# Patient Record
Sex: MALE | Race: BLACK OR AFRICAN AMERICAN | NOT HISPANIC OR LATINO | URBAN - METROPOLITAN AREA
[De-identification: names, ages, dates, MRNs, and addresses within clinical notes are randomized per-mention and may not be internally consistent; named-entity substitution may affect disease eponyms.]

---

## 2017-11-30 ENCOUNTER — INPATIENT (INPATIENT)
Facility: HOSPITAL | Age: 52
LOS: 2 days | Discharge: HOME | End: 2017-12-03
Attending: SURGERY | Admitting: SURGERY
Payer: COMMERCIAL

## 2017-11-30 VITALS
HEART RATE: 80 BPM | RESPIRATION RATE: 21 BRPM | SYSTOLIC BLOOD PRESSURE: 136 MMHG | DIASTOLIC BLOOD PRESSURE: 70 MMHG | OXYGEN SATURATION: 100 %

## 2017-11-30 DIAGNOSIS — K56.1 INTUSSUSCEPTION: ICD-10-CM

## 2017-11-30 DIAGNOSIS — Z98.0 INTESTINAL BYPASS AND ANASTOMOSIS STATUS: Chronic | ICD-10-CM

## 2017-11-30 LAB
ALBUMIN SERPL ELPH-MCNC: 4 G/DL — SIGNIFICANT CHANGE UP (ref 3.3–5)
ALP SERPL-CCNC: 63 U/L — SIGNIFICANT CHANGE UP (ref 40–120)
ALT FLD-CCNC: 12 U/L — SIGNIFICANT CHANGE UP (ref 4–41)
APAP SERPL-MCNC: < 15 UG/ML — LOW (ref 15–25)
APTT BLD: 27.9 SEC — SIGNIFICANT CHANGE UP (ref 27.5–37.4)
AST SERPL-CCNC: 24 U/L — SIGNIFICANT CHANGE UP (ref 4–40)
BARBITURATES MEASUREMENT: NEGATIVE — SIGNIFICANT CHANGE UP
BASE EXCESS BLDV CALC-SCNC: 3.4 MMOL/L — SIGNIFICANT CHANGE UP
BASE EXCESS BLDV CALC-SCNC: 5.6 MMOL/L — SIGNIFICANT CHANGE UP
BASOPHILS # BLD AUTO: 0.13 K/UL — SIGNIFICANT CHANGE UP (ref 0–0.2)
BASOPHILS NFR BLD AUTO: 1.9 % — SIGNIFICANT CHANGE UP (ref 0–2)
BENZODIAZ SERPL-MCNC: NEGATIVE — SIGNIFICANT CHANGE UP
BILIRUB SERPL-MCNC: 0.3 MG/DL — SIGNIFICANT CHANGE UP (ref 0.2–1.2)
BLOOD GAS VENOUS - CREATININE: 0.61 MG/DL — SIGNIFICANT CHANGE UP (ref 0.5–1.3)
BLOOD GAS VENOUS - CREATININE: 0.67 MG/DL — SIGNIFICANT CHANGE UP (ref 0.5–1.3)
BUN SERPL-MCNC: 5 MG/DL — LOW (ref 7–23)
CALCIUM SERPL-MCNC: 8.4 MG/DL — SIGNIFICANT CHANGE UP (ref 8.4–10.5)
CHLORIDE BLDV-SCNC: 104 MMOL/L — SIGNIFICANT CHANGE UP (ref 96–108)
CHLORIDE BLDV-SCNC: 106 MMOL/L — SIGNIFICANT CHANGE UP (ref 96–108)
CHLORIDE SERPL-SCNC: 103 MMOL/L — SIGNIFICANT CHANGE UP (ref 98–107)
CO2 SERPL-SCNC: 24 MMOL/L — SIGNIFICANT CHANGE UP (ref 22–31)
CREAT SERPL-MCNC: 0.69 MG/DL — SIGNIFICANT CHANGE UP (ref 0.5–1.3)
EOSINOPHIL # BLD AUTO: 0.75 K/UL — HIGH (ref 0–0.5)
EOSINOPHIL NFR BLD AUTO: 10.9 % — HIGH (ref 0–6)
ETHANOL BLD-MCNC: < 10 MG/DL — SIGNIFICANT CHANGE UP
GAS PNL BLDV: 139 MMOL/L — SIGNIFICANT CHANGE UP (ref 136–146)
GAS PNL BLDV: 140 MMOL/L — SIGNIFICANT CHANGE UP (ref 136–146)
GLUCOSE BLDV-MCNC: 124 — HIGH (ref 70–99)
GLUCOSE BLDV-MCNC: 93 — SIGNIFICANT CHANGE UP (ref 70–99)
GLUCOSE SERPL-MCNC: 116 MG/DL — HIGH (ref 70–99)
HCO3 BLDV-SCNC: 27 MMOL/L — SIGNIFICANT CHANGE UP (ref 20–27)
HCO3 BLDV-SCNC: 29 MMOL/L — HIGH (ref 20–27)
HCT VFR BLD CALC: 27 % — LOW (ref 39–50)
HCT VFR BLD CALC: 27.4 % — LOW (ref 39–50)
HCT VFR BLD CALC: 28.4 % — LOW (ref 39–50)
HCT VFR BLDV CALC: 27.5 % — LOW (ref 39–51)
HCT VFR BLDV CALC: 27.8 % — LOW (ref 39–51)
HGB BLD-MCNC: 8.3 G/DL — LOW (ref 13–17)
HGB BLD-MCNC: 8.5 G/DL — LOW (ref 13–17)
HGB BLD-MCNC: 8.7 G/DL — LOW (ref 13–17)
HGB BLDV-MCNC: 8.9 G/DL — LOW (ref 13–17)
HGB BLDV-MCNC: 9 G/DL — LOW (ref 13–17)
IMM GRANULOCYTES # BLD AUTO: 0.01 # — SIGNIFICANT CHANGE UP
IMM GRANULOCYTES NFR BLD AUTO: 0.1 % — SIGNIFICANT CHANGE UP (ref 0–1.5)
INR BLD: 1.1 — SIGNIFICANT CHANGE UP (ref 0.88–1.17)
LACTATE BLDV-MCNC: 1.1 MMOL/L — SIGNIFICANT CHANGE UP (ref 0.5–2)
LACTATE BLDV-MCNC: 3.8 MMOL/L — HIGH (ref 0.5–2)
LYMPHOCYTES # BLD AUTO: 2.29 K/UL — SIGNIFICANT CHANGE UP (ref 1–3.3)
LYMPHOCYTES # BLD AUTO: 33.4 % — SIGNIFICANT CHANGE UP (ref 13–44)
MCHC RBC-ENTMCNC: 21.3 PG — LOW (ref 27–34)
MCHC RBC-ENTMCNC: 21.6 PG — LOW (ref 27–34)
MCHC RBC-ENTMCNC: 21.6 PG — LOW (ref 27–34)
MCHC RBC-ENTMCNC: 30.6 % — LOW (ref 32–36)
MCHC RBC-ENTMCNC: 30.7 % — LOW (ref 32–36)
MCHC RBC-ENTMCNC: 31 % — LOW (ref 32–36)
MCV RBC AUTO: 69.4 FL — LOW (ref 80–100)
MCV RBC AUTO: 69.5 FL — LOW (ref 80–100)
MCV RBC AUTO: 70.1 FL — LOW (ref 80–100)
MONOCYTES # BLD AUTO: 0.65 K/UL — SIGNIFICANT CHANGE UP (ref 0–0.9)
MONOCYTES NFR BLD AUTO: 9.5 % — SIGNIFICANT CHANGE UP (ref 2–14)
NEUTROPHILS # BLD AUTO: 3.02 K/UL — SIGNIFICANT CHANGE UP (ref 1.8–7.4)
NEUTROPHILS NFR BLD AUTO: 44.2 % — SIGNIFICANT CHANGE UP (ref 43–77)
NRBC # FLD: 0 — SIGNIFICANT CHANGE UP
PCO2 BLDV: 43 MMHG — SIGNIFICANT CHANGE UP (ref 41–51)
PCO2 BLDV: 54 MMHG — HIGH (ref 41–51)
PH BLDV: 7.37 PH — SIGNIFICANT CHANGE UP (ref 7.32–7.43)
PH BLDV: 7.42 PH — SIGNIFICANT CHANGE UP (ref 7.32–7.43)
PLATELET # BLD AUTO: 102 K/UL — LOW (ref 150–400)
PLATELET # BLD AUTO: 80 K/UL — LOW (ref 150–400)
PLATELET # BLD AUTO: 91 K/UL — LOW (ref 150–400)
PMV BLD: SIGNIFICANT CHANGE UP FL (ref 7–13)
PO2 BLDV: 29 MMHG — LOW (ref 35–40)
PO2 BLDV: 52 MMHG — HIGH (ref 35–40)
POTASSIUM BLDV-SCNC: 3.6 MMOL/L — SIGNIFICANT CHANGE UP (ref 3.4–4.5)
POTASSIUM BLDV-SCNC: 3.7 MMOL/L — SIGNIFICANT CHANGE UP (ref 3.4–4.5)
POTASSIUM SERPL-MCNC: 3.9 MMOL/L — SIGNIFICANT CHANGE UP (ref 3.5–5.3)
POTASSIUM SERPL-SCNC: 3.9 MMOL/L — SIGNIFICANT CHANGE UP (ref 3.5–5.3)
PROT SERPL-MCNC: 7.2 G/DL — SIGNIFICANT CHANGE UP (ref 6–8.3)
PROTHROM AB SERPL-ACNC: 12.4 SEC — SIGNIFICANT CHANGE UP (ref 9.8–13.1)
RBC # BLD: 3.85 M/UL — LOW (ref 4.2–5.8)
RBC # BLD: 3.94 M/UL — LOW (ref 4.2–5.8)
RBC # BLD: 4.09 M/UL — LOW (ref 4.2–5.8)
RBC # FLD: 25.4 % — HIGH (ref 10.3–14.5)
RBC # FLD: 25.6 % — HIGH (ref 10.3–14.5)
RBC # FLD: 26.1 % — HIGH (ref 10.3–14.5)
RH IG SCN BLD-IMP: POSITIVE — SIGNIFICANT CHANGE UP
SALICYLATES SERPL-MCNC: < 5 MG/DL — LOW (ref 15–30)
SAO2 % BLDV: 46.9 % — LOW (ref 60–85)
SAO2 % BLDV: 81.9 % — SIGNIFICANT CHANGE UP (ref 60–85)
SODIUM SERPL-SCNC: 141 MMOL/L — SIGNIFICANT CHANGE UP (ref 135–145)
WBC # BLD: 4.81 K/UL — SIGNIFICANT CHANGE UP (ref 3.8–10.5)
WBC # BLD: 6.85 K/UL — SIGNIFICANT CHANGE UP (ref 3.8–10.5)
WBC # BLD: 7.33 K/UL — SIGNIFICANT CHANGE UP (ref 3.8–10.5)
WBC # FLD AUTO: 4.81 K/UL — SIGNIFICANT CHANGE UP (ref 3.8–10.5)
WBC # FLD AUTO: 6.85 K/UL — SIGNIFICANT CHANGE UP (ref 3.8–10.5)
WBC # FLD AUTO: 7.33 K/UL — SIGNIFICANT CHANGE UP (ref 3.8–10.5)

## 2017-11-30 PROCEDURE — 74176 CT ABD & PELVIS W/O CONTRAST: CPT | Mod: 26,59

## 2017-11-30 PROCEDURE — 99253 IP/OBS CNSLTJ NEW/EST LOW 45: CPT | Mod: GC

## 2017-11-30 PROCEDURE — 71010: CPT | Mod: 26

## 2017-11-30 PROCEDURE — 71260 CT THORAX DX C+: CPT | Mod: 26

## 2017-11-30 PROCEDURE — 99221 1ST HOSP IP/OBS SF/LOW 40: CPT | Mod: GC

## 2017-11-30 PROCEDURE — 74177 CT ABD & PELVIS W/CONTRAST: CPT | Mod: 26

## 2017-11-30 RX ORDER — FENTANYL CITRATE 50 UG/ML
100 INJECTION INTRAVENOUS ONCE
Qty: 0 | Refills: 0 | Status: DISCONTINUED | OUTPATIENT
Start: 2017-11-30 | End: 2017-11-30

## 2017-11-30 RX ORDER — ONDANSETRON 8 MG/1
4 TABLET, FILM COATED ORAL EVERY 6 HOURS
Qty: 0 | Refills: 0 | Status: COMPLETED | OUTPATIENT
Start: 2017-11-30 | End: 2017-11-30

## 2017-11-30 RX ORDER — FENTANYL CITRATE 50 UG/ML
50 INJECTION INTRAVENOUS ONCE
Qty: 0 | Refills: 0 | Status: DISCONTINUED | OUTPATIENT
Start: 2017-11-30 | End: 2017-11-30

## 2017-11-30 RX ORDER — PANTOPRAZOLE SODIUM 20 MG/1
80 TABLET, DELAYED RELEASE ORAL ONCE
Qty: 0 | Refills: 0 | Status: COMPLETED | OUTPATIENT
Start: 2017-11-30 | End: 2017-11-30

## 2017-11-30 RX ORDER — PANTOPRAZOLE SODIUM 20 MG/1
40 TABLET, DELAYED RELEASE ORAL EVERY 12 HOURS
Qty: 0 | Refills: 0 | Status: DISCONTINUED | OUTPATIENT
Start: 2017-11-30 | End: 2017-12-03

## 2017-11-30 RX ORDER — SODIUM CHLORIDE 9 MG/ML
1000 INJECTION INTRAMUSCULAR; INTRAVENOUS; SUBCUTANEOUS ONCE
Qty: 0 | Refills: 0 | Status: COMPLETED | OUTPATIENT
Start: 2017-11-30 | End: 2017-11-30

## 2017-11-30 RX ORDER — PANTOPRAZOLE SODIUM 20 MG/1
8 TABLET, DELAYED RELEASE ORAL
Qty: 80 | Refills: 0 | Status: DISCONTINUED | OUTPATIENT
Start: 2017-11-30 | End: 2017-11-30

## 2017-11-30 RX ORDER — ONDANSETRON 8 MG/1
8 TABLET, FILM COATED ORAL ONCE
Qty: 0 | Refills: 0 | Status: COMPLETED | OUTPATIENT
Start: 2017-11-30 | End: 2017-11-30

## 2017-11-30 RX ORDER — SODIUM CHLORIDE 9 MG/ML
1000 INJECTION, SOLUTION INTRAVENOUS
Qty: 0 | Refills: 0 | Status: DISCONTINUED | OUTPATIENT
Start: 2017-11-30 | End: 2017-12-01

## 2017-11-30 RX ORDER — MORPHINE SULFATE 50 MG/1
4 CAPSULE, EXTENDED RELEASE ORAL ONCE
Qty: 0 | Refills: 0 | Status: DISCONTINUED | OUTPATIENT
Start: 2017-11-30 | End: 2017-11-30

## 2017-11-30 RX ADMIN — PANTOPRAZOLE SODIUM 80 MILLIGRAM(S): 20 TABLET, DELAYED RELEASE ORAL at 04:49

## 2017-11-30 RX ADMIN — SODIUM CHLORIDE 1000 MILLILITER(S): 9 INJECTION INTRAMUSCULAR; INTRAVENOUS; SUBCUTANEOUS at 04:49

## 2017-11-30 RX ADMIN — FENTANYL CITRATE 100 MICROGRAM(S): 50 INJECTION INTRAVENOUS at 04:51

## 2017-11-30 RX ADMIN — FENTANYL CITRATE 50 MICROGRAM(S): 50 INJECTION INTRAVENOUS at 05:54

## 2017-11-30 RX ADMIN — FENTANYL CITRATE 50 MICROGRAM(S): 50 INJECTION INTRAVENOUS at 05:53

## 2017-11-30 RX ADMIN — PANTOPRAZOLE SODIUM 10 MG/HR: 20 TABLET, DELAYED RELEASE ORAL at 05:37

## 2017-11-30 RX ADMIN — PANTOPRAZOLE SODIUM 40 MILLIGRAM(S): 20 TABLET, DELAYED RELEASE ORAL at 17:55

## 2017-11-30 RX ADMIN — ONDANSETRON 8 MILLIGRAM(S): 8 TABLET, FILM COATED ORAL at 04:50

## 2017-11-30 RX ADMIN — SODIUM CHLORIDE 100 MILLILITER(S): 9 INJECTION, SOLUTION INTRAVENOUS at 09:55

## 2017-11-30 NOTE — ED ADULT TRIAGE NOTE - CHIEF COMPLAINT QUOTE
Pt. with c/o severe abdominal pain started 3pm acc with vomiting up blood.  Pt. with hx. stomach ulcers. Pt. riding in pain; very uncomfortable in triage. Unable to get vitals at this time. Pt. cannot sit still. very uncomfortable.

## 2017-11-30 NOTE — ED PROVIDER NOTE - CRITICAL CARE PROVIDED
interpretation of diagnostic studies/consultation with other physicians/consult w/ pt's family directly relating to pts condition/direct patient care (not related to procedure)/additional history taking/documentation

## 2017-11-30 NOTE — ED PROVIDER NOTE - ATTENDING CONTRIBUTION TO CARE
DR. ELLIOTT, ATTENDING MD-  I performed a face to face bedside interview with patient regarding history of present illness, review of symptoms and past medical history. I completed an independent physical exam.  I have discussed patient's plan of care with the resident.   Documentation as above in the note.  HPI: 52M with PMH PUD, gastritis presenting with abdominal pain 2 hrs prior to presentation with associated hematemesis. Pt reports was vomiting, no blood 2 hours prior to arrival, went to shower, then started having more N/V. called sister who lives upstairs, came down, drove pt to ED, en route, vomiting large amount BRB. Reports epigastric pain, no chest pain. Has had this before.   EXAM: non tender abd, no rebound, no guarding, BRB emesis in basin and active vomiting in ED. Appears severely uncomfortable. Writhing. Limited eval given condition.   MDM: Set up for intubation for airway protection. 2 large bore PIV, ordered uncross matched blood, resp therapy called for possible intubation. Sister and pt made aware. 2L NS started, waiting blood products. labs sent, CT ordered, CXR shows no free air under diaphragm, no PNA, no wide mediastinum.   CT resulted, shows intussception of SB into stomach. GI and Surgery aware. Will need surgical vs endoscopic intervention. Meanwhile will pain control and monitor closely.

## 2017-11-30 NOTE — ED PROVIDER NOTE - PROGRESS NOTE DETAILS
Sarahi: GI fellow paged Sarahi: GI fellow paged. no response Surgery aware and consulted. Sarahi: Patient's airway questionable initially with hematemesis. Emergent intubation considered. Patient's condition improved after medication with patient stable, protecting airway, mentating, stating pain much improved. Decision to observe and not intubate.   GI fellow paged. no response Sarahi: Patient stable. Protecting airway, mentating. No further episodes of hematemesis. Stating pain is mostly gone. Comfortable. Awaiting GI and surgery recs. Sarahi: GI aware and to see patient Pt stable, Surgery recs that they want Pt stable, Surgery recs that they want GI to EGD pt first before considering surgical intervention. Pending GI call back. Paged GI fellow multiple times, they are aware but have not seen pt. GI attending paged at this time. Spoke again to surgery resident, inquired if pt will most likely end of up surgical service even if GI performs EGD and he will speak to his attending regarding this.

## 2017-11-30 NOTE — H&P ADULT - NSHPPHYSICALEXAM_GEN_ALL_CORE
General: NAD, Lying in bed, appears comfortable now  Neuro: A+Ox3, no focal deficits  HEENT: NC/AT, EOMI  Cardio: RRR  Resp: Good effort  GI/Abd: Soft, minimally distended, non-tender, no rebound/guarding, no masses palpated.  Well-healed upper midline incision.    Vascular: All 4 extremities warm  Musculoskeletal: All 4 extremities moving spontaneously, no limitations.

## 2017-11-30 NOTE — ED PROVIDER NOTE - MEDICAL DECISION MAKING DETAILS
52M presenting with severe abdominal pain and nuria hematemesis with hx of PUD requiring biliroth II. Patient in severe distress and may require emergent airway protection. Likely UGI bleed given nuria red hematemesis with DDx includes bleeding PUD vs perforation, sincere jha tear, boerhaave, gastritis. Will order emergent noncrossed O+ blood, cbc, cmp, coags, type/screen, upright cxr, protonix, ct abd/pelvis, ct chest, zofran, morphine, fluids and reassess

## 2017-11-30 NOTE — H&P ADULT - HISTORY OF PRESENT ILLNESS
Patient is a 52y old  Male who presents with a chief complaint of abdominal pain and hematemesis.  His symptoms started early this morning, and his pain awoke him from his sleep.  He began feeling nauseated, and had several episodes of small-volume hematemesis at home.  He was brought to the ED by his sister for further evaluation.  He continued having several episodes of hematemesis after presentation to the ED.  His pain and nausea was medically controlled, and CT scan of the abdomen showed a jejuno-gastric intussusception.  Surgery consulted for possible intervention.      Patient has a history of PUD, and had a Billroth 2 procedure done over 20 years ago at an outside hospital for peptic ulcer disease.  He states that he had initially recovered well and was symptom free for many years, but over the past 3 years, he has presented to the hospital for similar symptoms.  He had workup including upper endoscopy, which showed gastritis with friable distal gastric mucosa, without any masses seen.      Patient denies fevers/chills, denies lightheadedness/dizziness, denies SOB/chest pain, denies constipation/diarrhea.  Last BM was yesterday, was normal, without nuria blood.      -------------------------------------------------------------------------------------------- Patient is a 52y old  Male who presents with a chief complaint of abdominal pain and hematemesis.  His symptoms started early this morning, and his pain awoke him from his sleep.  He began feeling nauseated, and had several episodes of small-volume hematemesis at home.  He was brought to the ED by his sister for further evaluation.  He continued having several episodes of hematemesis after presentation to the ED.  His pain and nausea was medically controlled, and CT scan of the abdomen showed a jejuno-gastric intussusception.  Surgery consulted for possible intervention.      Patient has a history of PUD, and had a Billroth 2 procedure done over 20 years ago at an outside hospital for peptic ulcer disease.  He states that he had initially recovered well and was symptom free for many years, but over the past 3 years, he has presented to the hospital for similar symptoms.  He had workup including upper endoscopy, which showed gastritis with friable distal gastric mucosa, without any masses seen.  Patient was recommended to followup with gastroenterology for repeat interval upper endoscopy as an outpatient, but patient has not followed up.      Patient denies fevers/chills, denies lightheadedness/dizziness, denies SOB/chest pain, denies constipation/diarrhea.  Last BM was yesterday, was normal, without nuria blood.      --------------------------------------------------------------------------------------------

## 2017-11-30 NOTE — H&P ADULT - NSHPLABSRESULTS_GEN_ALL_CORE
CBC (11-30 @ 04:30)                              8.7<L>                         6.85    )----------------(  102<L>     44.2  % Neutrophils, 33.4  % Lymphocytes, ANC: 3.02                                28.4<L>    BMP (11-30 @ 04:30)             141     |  103     |  5<L>  		Ca++ --      Ca 8.4                ---------------------------------( 116<H>		Mg --                 3.9     |  24      |  0.69  			Ph --        LFTs (11-30 @ 04:30)      TPro 7.2 / Alb 4.0 / TBili 0.3 / DBili -- / AST 24 / ALT 12 / AlkPhos 63    Coags (11-30 @ 04:30)  aPTT 27.9 / INR 1.10 / PT 12.4    VBG (11-30 @ 04:30)     7.42 / 43 / 29<L> / 27 / 3.4 / 46.9<L>%     Lactate: 3.8<H>    IMAGING:    < from: CT Chest w/ IV Cont (11.30.17 @ 05:28) >    IMPRESSION:     Intussusception along the gastrojejunostomy site with small bowel,   mesentery, and mesenteric vessels entering the inferior aspect of the   stomach.    < end of copied text >

## 2017-11-30 NOTE — CONSULT NOTE ADULT - ASSESSMENT
Impression: Impression:   - Hematemesis    Plan:  - trend CBC q12h, transfuse as needed to Hb goal>7  - please pantoprazole with bolus and then infusion at 8mg/hr  - please keep patient NPO for EGD  - supportive care as per primary team    GI team will continue to follow.  Thank you for the consult. Impression:   - Hematemesis with history of gastric ulcer and billroth II procedure; this episode could be 2/2 gastric ulcer vs anastomotic ulcer vs MW tear (given multiple episodes of vomiting)  - Abdominal pain 2/2 intussusception seen on CT abdomen    Plan:  - trend CBC q12h, transfuse as needed to Hb goal>7  - please pantoprazole with bolus and then infusion at 8mg/hr  - please keep patient NPO for possible EGD today  - serial abdominal exams to monitor for bowel ischemia  - pending abdominal ultrasound to assess for ongoing intussusception as per surgery  - supportive care as per primary team    GI team will continue to follow.  Thank you for the consult.

## 2017-11-30 NOTE — CONSULT NOTE ADULT - SUBJECTIVE AND OBJECTIVE BOX
Chief Complaint:  Patient is a 52y old  Male who presents with a chief complaint of     HPI:  Mr Ely is a 52 year old male with history of peptic ulcer disease, gastritis presenting with abdominal pain 2 hrs prior to presentation with associated hematemesis. As per family bedside started 2 hours ago with mild abdominal pain and mild hemoptysis which progressively worsened to severe abdominal pain and nuria hematemesis. Patient states he had some tea and a small meal prior to symptom onset and was feeling okay. States he has had problems with PUD and abdominal pain for years. Patient with hx of PUD requiring biliroth II for perforation roughly 20 years ago. Denies chest pain, sob, fever, chills, weaknes    Allergies:  No Known Allergies      Home Medications:    Hospital Medications:  morphine  - Injectable 4 milliGRAM(s) IV Push Once  pantoprazole Infusion 8 mG/Hr IV Continuous <Continuous>      PMHX/PSHX:  Chronic anemia  Gastric ulcer  History of Billroth II operation  S/P partial gastrectomy      Family history:  No pertinent family history in first degree relatives      Social History:     ROS:     General:  No wt loss, fevers, chills, night sweats, fatigue,   Eyes:  Good vision, no reported pain  ENT:  No sore throat, pain, runny nose, dysphagia  CV:  No pain, palpitations, hypo/hypertension  Resp:  No dyspnea, cough, tachypnea, wheezing  GI:  See HPI  :  No pain, bleeding, incontinence, nocturia  Muscle:  No pain, weakness  Neuro:  No weakness, tingling, memory problems  Psych:  No fatigue, insomnia, mood problems, depression  Endocrine:  No polyuria, polydipsia, cold/heat intolerance  Heme:  No petechiae, ecchymosis, easy bruisability  Skin:  No rash, edema      PHYSICAL EXAM:     GENERAL:  Appears stated age, well-groomed, well-nourished, no distress  HEENT:  NC/AT,  conjunctivae clear and pink,  no JVD  CHEST:  Full & symmetric excursion, no increased effort, breath sounds clear  HEART:  Regular rhythm, S1, S2, no murmur/rub/S3/S4, no abdominal bruit, no edema  ABDOMEN:  Soft, non-tender, non-distended, normoactive bowel sounds,  no masses ,  EXTREMITIES:  no cyanosis,clubbing or edema  SKIN:  No rash/erythema/ecchymoses/petechiae/wounds/abscess/warm/dry  NEURO:  Alert, oriented    Vital Signs:  Vital Signs Last 24 Hrs  T(C): 36.6 (30 Nov 2017 06:24), Max: 37.1 (30 Nov 2017 05:25)  T(F): 97.8 (30 Nov 2017 06:24), Max: 98.8 (30 Nov 2017 05:25)  HR: 77 (30 Nov 2017 06:24) (76 - 84)  BP: 140/84 (30 Nov 2017 06:24) (124/61 - 144/81)  BP(mean): --  RR: 22 (30 Nov 2017 06:24) (16 - 22)  SpO2: 99% (30 Nov 2017 06:24) (98% - 100%)  Daily     Daily     LABS:                        8.7    6.85  )-----------( 102      ( 30 Nov 2017 04:30 )             28.4     11-30    141  |  103  |  5<L>  ----------------------------<  116<H>  3.9   |  24  |  0.69    Ca    8.4      30 Nov 2017 04:30    TPro  7.2  /  Alb  4.0  /  TBili  0.3  /  DBili  x   /  AST  24  /  ALT  12  /  AlkPhos  63  11-30    LIVER FUNCTIONS - ( 30 Nov 2017 04:30 )  Alb: 4.0 g/dL / Pro: 7.2 g/dL / ALK PHOS: 63 u/L / ALT: 12 u/L / AST: 24 u/L / GGT: x           PT/INR - ( 30 Nov 2017 04:30 )   PT: 12.4 SEC;   INR: 1.10          PTT - ( 30 Nov 2017 04:30 )  PTT:27.9 SEC        Imaging:    < from: Upper Endoscopy (08.09.16 @ 09:30) >                                            Impression:          - Normal esophagus.                       - Z-line regular, 35 cm from the incisors.                       - Billroth II gastrojejunostomy was found, characterized                        by focal area of friable mucosa at gastric remnant.                       - No specimens collected.  Recommendation:      - Advance diet as tolerated.                       - Use Protonix (pantoprazole) 40 mg PO BID.                       - Plan for repeat endoscopy and possible colonoscoy when                        platelets above 50,000                                                                                     < end of copied text > Chief Complaint:  Patient is a 52y old  Male who presents with a chief complaint of     HPI:  Mr Ely is a 52 year old male with history of peptic ulcer disease (requiring biliroth II for perforation roughly 20 years ago) who presented with chief complain of abdominal pain with associated hematemesis.     As per patient, he was asleep, when he was woken up with mild epigastric abdominal pain and multiple episodes of vomiting, initially with food and then red blood (about a cup).  He denies chest pain, sob, fever, chills, weakness, nausea, lightheadedness, dizziness, constipation, diarrhea. His last BM was yesterday was brown in color without nuria blood.      Of note, he had recent outpatient workup including upper endoscopy, which showed gastritis with friable distal gastric mucosa, without any masses seen.      In the ER: GI was consulted for hematemesis and a CT scan of the abdomen showed a jejuno-gastric intussusception for which surgery was consulted.     Allergies:  No Known Allergies      Home Medications:   Patient Currently Takes Medications as of 10-Aug-2016 14:15 documented in Structured Notes  · 	ferrous sulfate 160 mg (50 mg elemental iron) oral tablet, extended release: 1 tab(s) orally 3 times a day with prune juice  · 	pantoprazole 40 mg oral delayed release tablet: 1 tab(s) orally 2 times a day (before meals)    Hospital Medications:  morphine  - Injectable 4 milliGRAM(s) IV Push Once  pantoprazole Infusion 8 mG/Hr IV Continuous <Continuous>      PMHX/PSHX:  Chronic anemia  Gastric ulcer  History of Billroth II operation  S/P partial gastrectomy      Family history:  Denies history of gastric ulcer disease, colon cancer, liver disease, uterine/ovarian/breast cancer.       Social History: Denies smoking, alcohol, drug use.     ROS:     General:  No wt loss, fevers, chills, night sweats, fatigue,   Eyes:  Good vision, no reported pain  ENT:  No sore throat, pain, runny nose, dysphagia  CV:  No pain, palpitations, hypo/hypertension  Resp:  No dyspnea, cough, tachypnea, wheezing  GI:  See HPI  :  No pain, bleeding, incontinence, nocturia  Muscle:  No pain, weakness  Neuro:  No weakness, tingling, memory problems  Psych:  No fatigue, insomnia, mood problems, depression  Endocrine:  No polyuria, polydipsia, cold/heat intolerance  Heme:  No petechiae, ecchymosis, easy bruisability  Skin:  No rash, edema      PHYSICAL EXAM:     GENERAL:  Appears stated age, well-groomed, well-nourished, no distress  HEENT:  NC/AT,  conjunctivae clear and pink,  no JVD  CHEST:  Full & symmetric excursion, no increased effort, breath sounds clear  HEART:  Regular rhythm, S1, S2, no murmur/rub/S3/S4, no abdominal bruit, no edema  ABDOMEN:  Soft, non-tender, non-distended, normoactive bowel sounds,  no masses ,  EXTREMITIES:  no cyanosis,clubbing or edema  SKIN:  No rash/erythema/ecchymoses/petechiae/wounds/abscess/warm/dry  NEURO:  Alert, oriented    Vital Signs:  Vital Signs Last 24 Hrs  T(C): 36.6 (30 Nov 2017 06:24), Max: 37.1 (30 Nov 2017 05:25)  T(F): 97.8 (30 Nov 2017 06:24), Max: 98.8 (30 Nov 2017 05:25)  HR: 77 (30 Nov 2017 06:24) (76 - 84)  BP: 140/84 (30 Nov 2017 06:24) (124/61 - 144/81)  BP(mean): --  RR: 22 (30 Nov 2017 06:24) (16 - 22)  SpO2: 99% (30 Nov 2017 06:24) (98% - 100%)  Daily     Daily     LABS:                        8.7    6.85  )-----------( 102      ( 30 Nov 2017 04:30 )             28.4     11-30    141  |  103  |  5<L>  ----------------------------<  116<H>  3.9   |  24  |  0.69    Ca    8.4      30 Nov 2017 04:30    TPro  7.2  /  Alb  4.0  /  TBili  0.3  /  DBili  x   /  AST  24  /  ALT  12  /  AlkPhos  63  11-30    LIVER FUNCTIONS - ( 30 Nov 2017 04:30 )  Alb: 4.0 g/dL / Pro: 7.2 g/dL / ALK PHOS: 63 u/L / ALT: 12 u/L / AST: 24 u/L / GGT: x           PT/INR - ( 30 Nov 2017 04:30 )   PT: 12.4 SEC;   INR: 1.10          PTT - ( 30 Nov 2017 04:30 )  PTT:27.9 SEC        Imaging:    < from: Upper Endoscopy (08.09.16 @ 09:30) >                                            Impression:          - Normal esophagus.                       - Z-line regular, 35 cm from the incisors.                       - Billroth II gastrojejunostomy was found, characterized                        by focal area of friable mucosa at gastric remnant.                       - No specimens collected.  Recommendation:      - Advance diet as tolerated.                       - Use Protonix (pantoprazole) 40 mg PO BID.                       - Plan for repeat endoscopy and possible colonoscoy when                        platelets above 50,000                                                                                     < end of copied text >    < from: CT Abdomen and Pelvis w/ IV Cont (11.30.17 @ 05:28) >  IMPRESSION:     Intussusception along the gastrojejunostomy site with small bowel,   mesentery, and mesenteric vessels entering the inferior aspect of the   stomach.    < end of copied text >

## 2017-11-30 NOTE — H&P ADULT - ASSESSMENT
ASSESSMENT: Patient is a 52y old m with h/o Billroth 2, hematemesis and abdominal pain likely secondary to jejunogastric intussusception    PLAN:    - NPO  - Protonix: will order as 40mg IV BID as no clear benefit to protonix drip.  - f/u GI: EGD today  - Serial abdominal exams, will monitor closely for concern of possible bowel ischemia  - Trend lactate.   - Patient and plan discussed with Attending, Dr. Low Lewis MD PGY3  B Team Surgery, #[id number] ASSESSMENT: Patient is a 52y old m with h/o Billroth 2, hematemesis and abdominal pain likely secondary to jejunogastric intussusception    PLAN:    - NPO  - Protonix: will order as 40mg IV BID as no clear benefit to protonix drip.  - f/u GI: EGD today  - Serial abdominal exams, will monitor closely for concern of possible bowel ischemia  - Trend lactate.   - For workup for ulcers secondary to hypergastrinemia, will check serum gastrin level  - Patient and plan discussed with Attending, Dr. Low Lewis MD PGY3  B Team Surgery, #[id number] ASSESSMENT: Patient is a 52y old m with h/o Billroth 2, hematemesis and abdominal pain likely secondary to jejunogastric intussusception    PLAN:    - NPO  - Protonix: will order as 40mg IV BID as no clear benefit to protonix drip.  - f/u GI: EGD today  - Serial abdominal exams, will monitor closely for concern of possible bowel ischemia  - Will obtain followup abdominal ultrasound to assess for ongoing intussusception as per recommendation of Radiologist.    - Trend lactate.   - For workup for ulcers secondary to hypergastrinemia, will check serum gastrin level  - Patient and plan discussed with Attending, Dr. Low Lewis MD PGY3  B Team Surgery, #[id number] ASSESSMENT: Patient is a 52y old m with h/o Billroth 2, hematemesis and abdominal pain likely secondary to jejunogastric intussusception    PLAN:    - NPO  - Protonix: will order as 40mg IV BID as no clear benefit to protonix drip.  - f/u GI: EGD today  - Serial abdominal exams, will monitor closely for concern of possible bowel ischemia  - Will obtain followup abdominal ultrasound to assess for ongoing intussusception as per recommendation of Radiologist.    - Trend lactate.   - For workup for ulcers secondary to hypergastrinemia, will check serum gastrin level  - Possible operative revision of gastrojejunostomy during this admission, no current indications for emergent operative intervention.  - Patient and plan discussed with Attending, Dr. Low Lewis MD PGY3  B Team Surgery, #[id number]

## 2017-11-30 NOTE — ED PROVIDER NOTE - OBJECTIVE STATEMENT
52M with PMH PUD, gastritis presenting with abdominal pain 2 hrs prior to presentation with associated hematemesis. As per family bedside started 2 hours ago with mild abdominal pain and mild hemoptysis which progressively worsened to severe abdominal pain and nuria hematemesis. Patient states he had some tea and a small meal prior to symptom onset and was feeling okay. States he has had problems with PUD and abdominal pain for years. Patient with hx of PUD requiring biliroth II for perforation roughly 20 years ago. Denies chest pain, sob, fever, chills, weakness.

## 2017-11-30 NOTE — PATIENT PROFILE ADULT. - NS TRANSFER PATIENT BELONGINGS
everton matamoros sneakers, 4underwear, 2 t shirts  book bag  wallet/Jewelry/Money (specify)/Cell Phone/PDA (specify)/Clothing

## 2017-11-30 NOTE — ED ADULT NURSE REASSESSMENT NOTE - NS ED NURSE REASSESS COMMENT FT1
Pt restless, thrashing on stretcher. + Bright red emesis. Pt screaming in pain " Stomach".  Iv access x3 obtained,NS 1000cc bolus intiated, labs sent, As per MD Mathews, pt to receive UncrossMatched blood. Blood bank called notified of transfusion EMergency, ED Tech sent to BB .Pt transferred to Trauma roomC. Primary Rn Gianna at bedside. Pt Medicated as per orders. zofran, protonix, protonix drip, fentanyl for pain.
Pt smiling talking calmly with sister. Pt st "Pain is gone." . Pt tolerating 1st unit of UncrossMatched blood. No s&s of reaction vss, see flow sheet charting.  As per MD Temple 2nd unit of UncrossMatched blood cancelled at this time. (Blood bank notified sent back to Blood bank in [place].) 2nd unit to be ordered for crossmatch blood. Primary Rn Dacia notified and aware.

## 2017-11-30 NOTE — ED ADULT NURSE NOTE - OBJECTIVE STATEMENT
Patient presents with multiple episodes of bloody emesis, guarding abdomen, writhing in pain. Pt accompanied by sister, reports hx of GI Ulcers. 2 IV placed. MD and ED staff at bedside.

## 2017-12-01 ENCOUNTER — RESULT REVIEW (OUTPATIENT)
Age: 52
End: 2017-12-01

## 2017-12-01 LAB
ANISOCYTOSIS BLD QL: SLIGHT — SIGNIFICANT CHANGE UP
BUN SERPL-MCNC: 5 MG/DL — LOW (ref 7–23)
CALCIUM SERPL-MCNC: 8.2 MG/DL — LOW (ref 8.4–10.5)
CHLORIDE SERPL-SCNC: 102 MMOL/L — SIGNIFICANT CHANGE UP (ref 98–107)
CO2 SERPL-SCNC: 28 MMOL/L — SIGNIFICANT CHANGE UP (ref 22–31)
CREAT SERPL-MCNC: 0.66 MG/DL — SIGNIFICANT CHANGE UP (ref 0.5–1.3)
DACRYOCYTES BLD QL SMEAR: SLIGHT — SIGNIFICANT CHANGE UP
GLUCOSE SERPL-MCNC: 76 MG/DL — SIGNIFICANT CHANGE UP (ref 70–99)
HCT VFR BLD CALC: 29.5 % — LOW (ref 39–50)
HGB BLD-MCNC: 8.5 G/DL — LOW (ref 13–17)
HYPOCHROMIA BLD QL: SLIGHT — SIGNIFICANT CHANGE UP
LG PLATELETS BLD QL AUTO: SLIGHT — SIGNIFICANT CHANGE UP
MAGNESIUM SERPL-MCNC: 1.5 MG/DL — LOW (ref 1.6–2.6)
MANUAL SMEAR VERIFICATION: SIGNIFICANT CHANGE UP
MCHC RBC-ENTMCNC: 20.6 PG — LOW (ref 27–34)
MCHC RBC-ENTMCNC: 28.8 % — LOW (ref 32–36)
MCV RBC AUTO: 71.4 FL — LOW (ref 80–100)
MICROCYTES BLD QL: SLIGHT — SIGNIFICANT CHANGE UP
NRBC # FLD: 0 — SIGNIFICANT CHANGE UP
OVALOCYTES BLD QL SMEAR: SIGNIFICANT CHANGE UP
PHOSPHATE SERPL-MCNC: 4.3 MG/DL — SIGNIFICANT CHANGE UP (ref 2.5–4.5)
PLATELET # BLD AUTO: 72 K/UL — LOW (ref 150–400)
PLATELET COUNT - ESTIMATE: SIGNIFICANT CHANGE UP
PMV BLD: SIGNIFICANT CHANGE UP FL (ref 7–13)
POIKILOCYTOSIS BLD QL AUTO: SLIGHT — SIGNIFICANT CHANGE UP
POLYCHROMASIA BLD QL SMEAR: SLIGHT — SIGNIFICANT CHANGE UP
POTASSIUM SERPL-MCNC: 3.8 MMOL/L — SIGNIFICANT CHANGE UP (ref 3.5–5.3)
POTASSIUM SERPL-SCNC: 3.8 MMOL/L — SIGNIFICANT CHANGE UP (ref 3.5–5.3)
RBC # BLD: 4.13 M/UL — LOW (ref 4.2–5.8)
RBC # FLD: 25.9 % — HIGH (ref 10.3–14.5)
SCHISTOCYTES BLD QL AUTO: SLIGHT — SIGNIFICANT CHANGE UP
SODIUM SERPL-SCNC: 142 MMOL/L — SIGNIFICANT CHANGE UP (ref 135–145)
WBC # BLD: 3.91 K/UL — SIGNIFICANT CHANGE UP (ref 3.8–10.5)
WBC # FLD AUTO: 3.91 K/UL — SIGNIFICANT CHANGE UP (ref 3.8–10.5)

## 2017-12-01 PROCEDURE — 88305 TISSUE EXAM BY PATHOLOGIST: CPT | Mod: 26

## 2017-12-01 PROCEDURE — 43239 EGD BIOPSY SINGLE/MULTIPLE: CPT | Mod: GC

## 2017-12-01 PROCEDURE — 88312 SPECIAL STAINS GROUP 1: CPT | Mod: 26

## 2017-12-01 RX ORDER — DEXTROSE MONOHYDRATE, SODIUM CHLORIDE, AND POTASSIUM CHLORIDE 50; .745; 4.5 G/1000ML; G/1000ML; G/1000ML
1000 INJECTION, SOLUTION INTRAVENOUS
Qty: 0 | Refills: 0 | Status: DISCONTINUED | OUTPATIENT
Start: 2017-12-01 | End: 2017-12-03

## 2017-12-01 RX ADMIN — PANTOPRAZOLE SODIUM 40 MILLIGRAM(S): 20 TABLET, DELAYED RELEASE ORAL at 06:20

## 2017-12-01 RX ADMIN — DEXTROSE MONOHYDRATE, SODIUM CHLORIDE, AND POTASSIUM CHLORIDE 100 MILLILITER(S): 50; .745; 4.5 INJECTION, SOLUTION INTRAVENOUS at 19:00

## 2017-12-01 RX ADMIN — PANTOPRAZOLE SODIUM 40 MILLIGRAM(S): 20 TABLET, DELAYED RELEASE ORAL at 18:36

## 2017-12-01 NOTE — PROGRESS NOTE ADULT - SUBJECTIVE AND OBJECTIVE BOX
B Team (General Surgery) Daily Progress Note    SUBJECTIVE:  Pt seen and examined, and is resting comfortably in bed. No acute events overnight. Pain is adequately controlled on current regimen. Pt has no complaints at this time.     OBJECTIVE:  Vital Signs Last 24 Hrs  T(C): 36.8 (01 Dec 2017 08:18), Max: 36.9 (30 Nov 2017 22:13)  T(F): 98.2 (01 Dec 2017 08:18), Max: 98.5 (30 Nov 2017 22:13)  HR: 59 (01 Dec 2017 08:18) (59 - 68)  BP: 122/75 (01 Dec 2017 08:18) (116/82 - 122/75)  BP(mean): --  RR: 16 (01 Dec 2017 08:18) (16 - 19)  SpO2: 100% (01 Dec 2017 08:18) (99% - 100%)    I&O's Detail    30 Nov 2017 07:01  -  01 Dec 2017 07:00  --------------------------------------------------------  IN:    lactated ringers.: 1550 mL  Total IN: 1550 mL    OUT:    Voided: 250 mL  Total OUT: 250 mL    Total NET: 1300 mL      01 Dec 2017 07:01  -  01 Dec 2017 13:52  --------------------------------------------------------  IN:    lactated ringers.: 800 mL  Total IN: 800 mL    OUT:  Total OUT: 0 mL    Total NET: 800 mL        Exam:  GEN: A&Ox3, NAD  HEENT: atraumatic, normocephalic  CV: no JVD  RESP: no increased work of breathing, no use of accessory muscles  GI/ABD: soft, non-tender, nondistended, no rebound or guarding  : deferred  EXTREMITIES: warm, pink, well-perfused                        8.3    4.81  )-----------( 80       ( 30 Nov 2017 20:45 )             27.0       11-30    141  |  103  |  5<L>  ----------------------------<  116<H>  3.9   |  24  |  0.69    Ca    8.4      30 Nov 2017 04:30    TPro  7.2  /  Alb  4.0  /  TBili  0.3  /  DBili  x   /  AST  24  /  ALT  12  /  AlkPhos  63  11-30      PT/INR - ( 30 Nov 2017 04:30 )   PT: 12.4 SEC;   INR: 1.10          PTT - ( 30 Nov 2017 04:30 )  PTT:27.9 SEC    ASSESSMENT:  Patient is a 52y old m with h/o Billroth 2, hematemesis and abdominal pain likely secondary to jejunogastric intussusception    PLAN:    - Diet: NPO  - EGD today  - Serial abdominal exams  - Activity: OOB/ambulation  - Incentive spirometry  - DVT prophylaxis  - Dispo: admitted to B-team surgery        Abundio Loyd MD PGY-1  pager: [phone]

## 2017-12-02 ENCOUNTER — TRANSCRIPTION ENCOUNTER (OUTPATIENT)
Age: 52
End: 2017-12-02

## 2017-12-02 LAB
BUN SERPL-MCNC: 4 MG/DL — LOW (ref 7–23)
BUN SERPL-MCNC: 4 MG/DL — LOW (ref 7–23)
CALCIUM SERPL-MCNC: 8.1 MG/DL — LOW (ref 8.4–10.5)
CALCIUM SERPL-MCNC: 8.2 MG/DL — LOW (ref 8.4–10.5)
CHLORIDE SERPL-SCNC: 103 MMOL/L — SIGNIFICANT CHANGE UP (ref 98–107)
CHLORIDE SERPL-SCNC: 104 MMOL/L — SIGNIFICANT CHANGE UP (ref 98–107)
CO2 SERPL-SCNC: 26 MMOL/L — SIGNIFICANT CHANGE UP (ref 22–31)
CO2 SERPL-SCNC: 27 MMOL/L — SIGNIFICANT CHANGE UP (ref 22–31)
CREAT SERPL-MCNC: 0.63 MG/DL — SIGNIFICANT CHANGE UP (ref 0.5–1.3)
CREAT SERPL-MCNC: 0.65 MG/DL — SIGNIFICANT CHANGE UP (ref 0.5–1.3)
FERRITIN SERPL-MCNC: 6.13 NG/ML — LOW (ref 30–400)
GLUCOSE SERPL-MCNC: 105 MG/DL — HIGH (ref 70–99)
GLUCOSE SERPL-MCNC: 97 MG/DL — SIGNIFICANT CHANGE UP (ref 70–99)
HCT VFR BLD CALC: 28.8 % — LOW (ref 39–50)
HGB BLD-MCNC: 8.6 G/DL — LOW (ref 13–17)
IRON SATN MFR SERPL: 20 UG/DL — LOW (ref 45–165)
IRON SATN MFR SERPL: 25 UG/DL — LOW (ref 45–165)
IRON SATN MFR SERPL: 259 UG/DL — SIGNIFICANT CHANGE UP (ref 155–535)
MAGNESIUM SERPL-MCNC: 1.5 MG/DL — LOW (ref 1.6–2.6)
MAGNESIUM SERPL-MCNC: 2.3 MG/DL — SIGNIFICANT CHANGE UP (ref 1.6–2.6)
MCHC RBC-ENTMCNC: 20.9 PG — LOW (ref 27–34)
MCHC RBC-ENTMCNC: 29.9 % — LOW (ref 32–36)
MCV RBC AUTO: 70.1 FL — LOW (ref 80–100)
NRBC # FLD: 0 — SIGNIFICANT CHANGE UP
PHOSPHATE SERPL-MCNC: 4.1 MG/DL — SIGNIFICANT CHANGE UP (ref 2.5–4.5)
PHOSPHATE SERPL-MCNC: 4.2 MG/DL — SIGNIFICANT CHANGE UP (ref 2.5–4.5)
PLATELET # BLD AUTO: 78 K/UL — LOW (ref 150–400)
PMV BLD: SIGNIFICANT CHANGE UP FL (ref 7–13)
POTASSIUM SERPL-MCNC: 3.7 MMOL/L — SIGNIFICANT CHANGE UP (ref 3.5–5.3)
POTASSIUM SERPL-MCNC: 3.8 MMOL/L — SIGNIFICANT CHANGE UP (ref 3.5–5.3)
POTASSIUM SERPL-SCNC: 3.7 MMOL/L — SIGNIFICANT CHANGE UP (ref 3.5–5.3)
POTASSIUM SERPL-SCNC: 3.8 MMOL/L — SIGNIFICANT CHANGE UP (ref 3.5–5.3)
RBC # BLD: 4.11 M/UL — LOW (ref 4.2–5.8)
RBC # FLD: 26.2 % — HIGH (ref 10.3–14.5)
SODIUM SERPL-SCNC: 141 MMOL/L — SIGNIFICANT CHANGE UP (ref 135–145)
SODIUM SERPL-SCNC: 141 MMOL/L — SIGNIFICANT CHANGE UP (ref 135–145)
UIBC SERPL-MCNC: 239 UG/DL — SIGNIFICANT CHANGE UP (ref 110–370)
VIT B12 SERPL-MCNC: 273 PG/ML — SIGNIFICANT CHANGE UP (ref 200–900)
VIT B12 SERPL-MCNC: 297 PG/ML — SIGNIFICANT CHANGE UP (ref 200–900)
WBC # BLD: 4.26 K/UL — SIGNIFICANT CHANGE UP (ref 3.8–10.5)
WBC # FLD AUTO: 4.26 K/UL — SIGNIFICANT CHANGE UP (ref 3.8–10.5)

## 2017-12-02 RX ORDER — MAGNESIUM SULFATE 500 MG/ML
2 VIAL (ML) INJECTION ONCE
Qty: 0 | Refills: 0 | Status: COMPLETED | OUTPATIENT
Start: 2017-12-02 | End: 2017-12-02

## 2017-12-02 RX ORDER — SODIUM,POTASSIUM PHOSPHATES 278-250MG
2 POWDER IN PACKET (EA) ORAL
Qty: 0 | Refills: 0 | Status: DISCONTINUED | OUTPATIENT
Start: 2017-12-02 | End: 2017-12-03

## 2017-12-02 RX ADMIN — DEXTROSE MONOHYDRATE, SODIUM CHLORIDE, AND POTASSIUM CHLORIDE 100 MILLILITER(S): 50; .745; 4.5 INJECTION, SOLUTION INTRAVENOUS at 11:33

## 2017-12-02 RX ADMIN — PANTOPRAZOLE SODIUM 40 MILLIGRAM(S): 20 TABLET, DELAYED RELEASE ORAL at 05:11

## 2017-12-02 RX ADMIN — Medication 2 TABLET(S): at 17:37

## 2017-12-02 RX ADMIN — Medication 50 GRAM(S): at 16:33

## 2017-12-02 RX ADMIN — PANTOPRAZOLE SODIUM 40 MILLIGRAM(S): 20 TABLET, DELAYED RELEASE ORAL at 17:37

## 2017-12-02 NOTE — DISCHARGE NOTE ADULT - PATIENT PORTAL LINK FT
“You can access the [facility] Patient Portal, offered by [facility], by registering with the following website: [email or web address]”

## 2017-12-02 NOTE — DISCHARGE NOTE ADULT - HOSPITAL COURSE
Patient is a 52y old  Male who presented 11/30/17 with a chief complaint of abdominal pain and hematemesis.  His symptoms started early this morning, and his pain awoke him from his sleep.  He began feeling nauseated, and had several episodes of small-volume hematemesis at home.  He was brought to the ED by his sister for further evaluation.  He continued having several episodes of hematemesis after presentation to the ED.  His pain and nausea was medically controlled, and CT scan of the abdomen showed a jejunogastric intussusception.  Surgery consulted for possible intervention.      Patient has a history of PUD, and had a Billroth 2 procedure done over 20 years ago at an outside hospital for peptic ulcer disease.  He states that he had initially recovered well and was symptom free for many years, but over the past 3 years, he has presented to the hospital for similar symptoms.  He had workup including upper endoscopy, which showed gastritis with friable distal gastric mucosa, without any masses seen.  Patient was recommended to followup with gastroenterology for repeat interval upper endoscopy as an outpatient, but patient has not followed up.      Patient denies fevers/chills, denies lightheadedness/dizziness, denies SOB/chest pain, denies constipation/diarrhea.  Last BM was 11/29/17, and was normal, without nuria blood.      Pt underwent EGD w/ GI, and the results are detailed below.  GI recommended Iron studies, serum B12 level, methylmalonic acid level, and outpatient follow-up.      Pt is tolerating a regular diet, voiding appropriately and he no evidence of bleeding.  He reports no pain and is hemodynamically stable and ready for discharge home.      < from: Upper Endoscopy (12.01.17 @ 09:44) >  Findings:       The oropharynx was normal. Localized mild mucosal changes characterized by punctated white spots were found in the middle third of the esophagus suggestive for eosinophilic esophagitis. Biopsies were taken with a cold forceps for histology. The exam of the esophagus was otherwise normal. Evidence of a patent Billroth II gastrojejunostomy was found. The gastrojejunal anastomosis was characterized by friable mucosa. This was traversed. The efferent limb was examined. The afferent limb was examined. Diffuse mildly friable mucosa with contact bleeding was found in the gastric body. Biopsies were taken with a cold forceps for histology.  A bleeding superficial mucosal tear of unknown etiology was found in the gastric body. This measured 4 mm in length.  The exam of the stomach was otherwise normal.  The examined jejunum was normal. Biopsies for histology were taken with a cold forceps for evaluation of celiac disease.                                                                                   Impression:                         - Normal oropharynx.                       - Punctate white spotted mucosa in the esophagus. Biopsied.                       - Patent Billroth II gastrojejunostomy was found, characterized by friable mucosa.                       - Friable gastric mucosa. Biopsied.                       - Superficial mucosal tear in the gastric body.                       - Normal examined jejunum. Biopsied.

## 2017-12-02 NOTE — DISCHARGE NOTE ADULT - CARE PROVIDER_API CALL
Delfino Kelsey), Surgery  [address] [facility]  [address] [address]  Phone: [phone]  Fax: [phone]    Devante Germain), Gastroenterology; Internal Medicine  [address] [place] [address]  [address]  [place] [address]  Phone: [phone]  Fax: [phone]

## 2017-12-02 NOTE — DISCHARGE NOTE ADULT - ADDITIONAL INSTRUCTIONS
Please make an appointment to follow-up with Dr. Kelsey (surgery) in 2 weeks.  Also, please make an appointment to follow-up with Dr. Germain (GI) in 2 weeks.

## 2017-12-02 NOTE — DISCHARGE NOTE ADULT - CARE PROVIDERS DIRECT ADDRESSES
[email or web address]

## 2017-12-02 NOTE — DISCHARGE NOTE ADULT - CARE PLAN
Principal Discharge DX:	Intussusception  Goal:	Continued resolution of abdominal pain  Instructions for follow-up, activity and diet:	DIET: You may resume your regular diet.  NOTIFY YOUR SURGEON IF: You have any bleeding that does not stop, vomiting blood, chills, persistent nausea/vomiting, or persistent diarrhea  FOLLOW-UP: Please follow up with your primary care physician in week regarding your hospitalization.  Secondary Diagnosis:	Chronic anemia

## 2017-12-02 NOTE — DISCHARGE NOTE ADULT - INSTRUCTIONS
None increasing nausea, vomiting, stomach bloating, increasing pain, fever feeling weakness with blooding stool call MD.

## 2017-12-02 NOTE — DISCHARGE NOTE ADULT - PLAN OF CARE
Continued resolution of abdominal pain DIET: You may resume your regular diet.  NOTIFY YOUR SURGEON IF: You have any bleeding that does not stop, vomiting blood, chills, persistent nausea/vomiting, or persistent diarrhea  FOLLOW-UP: Please follow up with your primary care physician in week regarding your hospitalization.

## 2017-12-03 VITALS
SYSTOLIC BLOOD PRESSURE: 124 MMHG | HEART RATE: 59 BPM | DIASTOLIC BLOOD PRESSURE: 77 MMHG | RESPIRATION RATE: 16 BRPM | TEMPERATURE: 98 F | OXYGEN SATURATION: 99 %

## 2017-12-03 RX ADMIN — Medication 2 TABLET(S): at 09:10

## 2017-12-03 RX ADMIN — PANTOPRAZOLE SODIUM 40 MILLIGRAM(S): 20 TABLET, DELAYED RELEASE ORAL at 05:57

## 2017-12-03 NOTE — PROGRESS NOTE ADULT - SUBJECTIVE AND OBJECTIVE BOX
B Team (General Surgery) Daily Progress Note    SUBJECTIVE:  Pt seen and examined, and is resting comfortably in bed. No acute events overnight. Pain is adequately controlled on current regimen. Pt has no complaints at this time.     OBJECTIVE:  T(C): 36.6 (12-03-17 @ 05:42), Max: 36.9 (12-02-17 @ 21:56)  HR: 59 (12-03-17 @ 05:42) (56 - 71)  BP: 114/73 (12-03-17 @ 05:42) (113/71 - 128/85)  RR: 16 (12-03-17 @ 05:42) (16 - 18)  SpO2: 100% (12-03-17 @ 05:42) (98% - 100%)  Wt(kg): --    12-02 @ 07:01  -  12-03 @ 07:00  --------------------------------------------------------  IN:    dextrose 5% + sodium chloride 0.9% with potassium chloride 20 mEq/L: 2400 mL    IV PiggyBack: 50 mL    Oral Fluid: 480 mL  Total IN: 2930 mL    OUT:  Total OUT: 0 mL    Total NET: 2930 mL        Exam:  GEN: A&Ox3, NAD  RESP: no increased work of breathing, no use of accessory muscles  GI/ABD: soft, non-tender, nondistended, no rebound or guarding  EXTREMITIES: warm, pink, well-perfused                                 8.6    4.26  )-----------( 78       ( 02 Dec 2017 06:35 )             28.8     12-02    141  |  103  |  4<L>  ----------------------------<  105<H>  3.8   |  26  |  0.63    Ca    8.2<L>      02 Dec 2017 17:15  Phos  4.2     12-02  Mg     2.3     12-02              ASSESSMENT:  Patient is a 52y old m with h/o Billroth 2, hematemesis and abdominal pain likely secondary to jejunogastric intussusception, EGD unremarkable for active bleed, currently stable    PLAN:    - Diet: regular  - Activity: OOB/ambulation  - Incentive spirometry  - DVT prophylaxis  - Dispo: home today      Cece Barragan, PGY1  [id number]

## 2017-12-04 LAB
GASTRIN SERPL-MCNC: < 10 PG/ML — SIGNIFICANT CHANGE UP
TRANSFERRIN SERPL-MCNC: 243 MG/DL — SIGNIFICANT CHANGE UP (ref 200–360)

## 2017-12-06 LAB
METHYLMALONATE SERPL-SCNC: 302 NMOL/L — SIGNIFICANT CHANGE UP (ref 0–378)
SURGICAL PATHOLOGY STUDY: SIGNIFICANT CHANGE UP

## 2019-07-03 NOTE — ED ADULT NURSE REASSESSMENT NOTE - SYMPTOMS
Patient seen for GDM diet controlled.  Less than 8% BS elevated.  She remains on no meds and well controlled.    BPP was 8 of 8.  MVP normal.    No further MD reviews necessary.  We will review BS at [facility].  Continue weekly testing.        I spent 10 min in patient care management and consultation greater 50% face-to-face.      Chapis Rangel    
[pt arrives vomiting copious amts of bright red blood./abdominal pain/vomiting
pain:/Pt st" Pain is gone....thank god, thank you."

## 2019-07-04 ENCOUNTER — EMERGENCY (EMERGENCY)
Facility: HOSPITAL | Age: 54
LOS: 0 days | Discharge: HOME | End: 2019-07-04
Attending: EMERGENCY MEDICINE
Payer: COMMERCIAL

## 2019-07-04 VITALS
DIASTOLIC BLOOD PRESSURE: 92 MMHG | TEMPERATURE: 98 F | WEIGHT: 160.06 LBS | RESPIRATION RATE: 16 BRPM | OXYGEN SATURATION: 100 % | SYSTOLIC BLOOD PRESSURE: 142 MMHG | HEIGHT: 73 IN | HEART RATE: 76 BPM

## 2019-07-04 DIAGNOSIS — M79.645 PAIN IN LEFT FINGER(S): ICD-10-CM

## 2019-07-04 DIAGNOSIS — Z98.0 INTESTINAL BYPASS AND ANASTOMOSIS STATUS: Chronic | ICD-10-CM

## 2019-07-04 DIAGNOSIS — Z91.013 ALLERGY TO SEAFOOD: ICD-10-CM

## 2019-07-04 DIAGNOSIS — Z91.040 LATEX ALLERGY STATUS: ICD-10-CM

## 2019-07-04 DIAGNOSIS — L03.012 CELLULITIS OF LEFT FINGER: ICD-10-CM

## 2019-07-04 DIAGNOSIS — D64.9 ANEMIA, UNSPECIFIED: ICD-10-CM

## 2019-07-04 PROCEDURE — 99283 EMERGENCY DEPT VISIT LOW MDM: CPT | Mod: 25

## 2019-07-04 PROCEDURE — 10060 I&D ABSCESS SIMPLE/SINGLE: CPT

## 2019-07-04 RX ORDER — AZTREONAM 2 G
1 VIAL (EA) INJECTION
Qty: 14 | Refills: 0
Start: 2019-07-04 | End: 2019-07-10

## 2019-07-04 RX ADMIN — Medication 1 TABLET(S): at 21:27

## 2019-07-04 NOTE — ED ADULT NURSE NOTE - NSIMPLEMENTINTERV_GEN_ALL_ED
Implemented All Universal Safety Interventions:  [place] to call system. Call bell, personal items and telephone within reach. Instruct patient to call for assistance. Room bathroom lighting operational. Non-slip footwear when patient is off stretcher. Physically safe environment: no spills, clutter or unnecessary equipment. Stretcher in lowest position, wheels locked, appropriate side rails in place.

## 2019-07-04 NOTE — ED PROVIDER NOTE - CLINICAL SUMMARY MEDICAL DECISION MAKING FREE TEXT BOX
paronychia drained on left thumb - will start on bactrim and follow up given to Dr. Cain if not improved.

## 2019-07-04 NOTE — ED ADULT TRIAGE NOTE - CHIEF COMPLAINT QUOTE
pt states he cut his left thumb two days ago while washing dishes. noted swelling to thumb x 2 days with pain. denies drainage or fevers. denies any medical history

## 2019-07-04 NOTE — ED PROVIDER NOTE - OBJECTIVE STATEMENT
53 year old male without significant PMH presenting to ED due to left thumb swelling and pain - otherwise states he had something stick to his thumb when he was washing dishes, no clear cut or laceration however.

## 2019-07-04 NOTE — ED PROVIDER NOTE - MUSCULOSKELETAL MINIMAL EXAM
left great thumb area with swelling - pain by base of nail laterally - swelling on lateral aspect as well of tip of nail.

## 2020-03-09 ENCOUNTER — TRANSCRIPTION ENCOUNTER (OUTPATIENT)
Age: 55
End: 2020-03-09

## 2020-04-27 NOTE — ED PROVIDER NOTE - NS HIV RISK FACTOR YES
Declined Wartpeel Counseling:  I discussed with the patient the risks of Wartpeel including but not limited to erythema, scaling, itching, weeping, crusting, and pain.

## 2021-04-13 ENCOUNTER — INPATIENT (INPATIENT)
Facility: HOSPITAL | Age: 56
LOS: 10 days | Discharge: HOME | End: 2021-04-24
Attending: SURGERY | Admitting: SURGERY
Payer: COMMERCIAL

## 2021-04-13 VITALS
HEART RATE: 85 BPM | RESPIRATION RATE: 18 BRPM | DIASTOLIC BLOOD PRESSURE: 64 MMHG | TEMPERATURE: 98 F | HEIGHT: 73 IN | OXYGEN SATURATION: 100 % | SYSTOLIC BLOOD PRESSURE: 115 MMHG

## 2021-04-13 DIAGNOSIS — Z98.0 INTESTINAL BYPASS AND ANASTOMOSIS STATUS: Chronic | ICD-10-CM

## 2021-04-13 DIAGNOSIS — K92.0 HEMATEMESIS: ICD-10-CM

## 2021-04-13 DIAGNOSIS — Z29.9 ENCOUNTER FOR PROPHYLACTIC MEASURES, UNSPECIFIED: ICD-10-CM

## 2021-04-13 DIAGNOSIS — K56.1 INTUSSUSCEPTION: ICD-10-CM

## 2021-04-13 DIAGNOSIS — D69.6 THROMBOCYTOPENIA, UNSPECIFIED: ICD-10-CM

## 2021-04-13 DIAGNOSIS — D64.9 ANEMIA, UNSPECIFIED: ICD-10-CM

## 2021-04-13 LAB
ALBUMIN SERPL ELPH-MCNC: 3.4 G/DL — SIGNIFICANT CHANGE UP (ref 3.3–5)
ALBUMIN SERPL ELPH-MCNC: 4 G/DL — SIGNIFICANT CHANGE UP (ref 3.3–5)
ALP SERPL-CCNC: 72 U/L — SIGNIFICANT CHANGE UP (ref 40–120)
ALP SERPL-CCNC: 86 U/L — SIGNIFICANT CHANGE UP (ref 40–120)
ALT FLD-CCNC: 13 U/L — SIGNIFICANT CHANGE UP (ref 4–41)
ALT FLD-CCNC: 19 U/L — SIGNIFICANT CHANGE UP (ref 4–41)
ANION GAP SERPL CALC-SCNC: 6 MMOL/L — LOW (ref 7–14)
ANION GAP SERPL CALC-SCNC: 9 MMOL/L — SIGNIFICANT CHANGE UP (ref 7–14)
ANISOCYTOSIS BLD QL: SIGNIFICANT CHANGE UP
APTT BLD: 29.2 SEC — SIGNIFICANT CHANGE UP (ref 27–36.3)
AST SERPL-CCNC: 19 U/L — SIGNIFICANT CHANGE UP (ref 4–40)
AST SERPL-CCNC: 23 U/L — SIGNIFICANT CHANGE UP (ref 4–40)
B PERT DNA SPEC QL NAA+PROBE: SIGNIFICANT CHANGE UP
BASOPHILS # BLD AUTO: 0.03 K/UL — SIGNIFICANT CHANGE UP (ref 0–0.2)
BASOPHILS # BLD AUTO: 0.11 K/UL — SIGNIFICANT CHANGE UP (ref 0–0.2)
BASOPHILS NFR BLD AUTO: 0.6 % — SIGNIFICANT CHANGE UP (ref 0–2)
BASOPHILS NFR BLD AUTO: 2 % — SIGNIFICANT CHANGE UP (ref 0–2)
BILIRUB SERPL-MCNC: 0.2 MG/DL — SIGNIFICANT CHANGE UP (ref 0.2–1.2)
BILIRUB SERPL-MCNC: 0.7 MG/DL — SIGNIFICANT CHANGE UP (ref 0.2–1.2)
BLD GP AB SCN SERPL QL: POSITIVE — SIGNIFICANT CHANGE UP
BUN SERPL-MCNC: 3 MG/DL — LOW (ref 7–23)
BUN SERPL-MCNC: 6 MG/DL — LOW (ref 7–23)
C PNEUM DNA SPEC QL NAA+PROBE: SIGNIFICANT CHANGE UP
CALCIUM SERPL-MCNC: 8.6 MG/DL — SIGNIFICANT CHANGE UP (ref 8.4–10.5)
CALCIUM SERPL-MCNC: 9 MG/DL — SIGNIFICANT CHANGE UP (ref 8.4–10.5)
CHLORIDE SERPL-SCNC: 103 MMOL/L — SIGNIFICANT CHANGE UP (ref 98–107)
CHLORIDE SERPL-SCNC: 106 MMOL/L — SIGNIFICANT CHANGE UP (ref 98–107)
CO2 SERPL-SCNC: 29 MMOL/L — SIGNIFICANT CHANGE UP (ref 22–31)
CO2 SERPL-SCNC: 30 MMOL/L — SIGNIFICANT CHANGE UP (ref 22–31)
CREAT SERPL-MCNC: 0.62 MG/DL — SIGNIFICANT CHANGE UP (ref 0.5–1.3)
CREAT SERPL-MCNC: 0.66 MG/DL — SIGNIFICANT CHANGE UP (ref 0.5–1.3)
DACRYOCYTES BLD QL SMEAR: SLIGHT — SIGNIFICANT CHANGE UP
ELLIPTOCYTES BLD QL SMEAR: SLIGHT — SIGNIFICANT CHANGE UP
EOSINOPHIL # BLD AUTO: 0.06 K/UL — SIGNIFICANT CHANGE UP (ref 0–0.5)
EOSINOPHIL # BLD AUTO: 0.1 K/UL — SIGNIFICANT CHANGE UP (ref 0–0.5)
EOSINOPHIL NFR BLD AUTO: 1 % — SIGNIFICANT CHANGE UP (ref 0–6)
EOSINOPHIL NFR BLD AUTO: 2.1 % — SIGNIFICANT CHANGE UP (ref 0–6)
FIBRINOGEN PPP-MCNC: 239 MG/DL — LOW (ref 290–520)
FLUAV SUBTYP SPEC NAA+PROBE: SIGNIFICANT CHANGE UP
FLUBV RNA SPEC QL NAA+PROBE: SIGNIFICANT CHANGE UP
GLUCOSE SERPL-MCNC: 100 MG/DL — HIGH (ref 70–99)
GLUCOSE SERPL-MCNC: 112 MG/DL — HIGH (ref 70–99)
HADV DNA SPEC QL NAA+PROBE: SIGNIFICANT CHANGE UP
HCOV 229E RNA SPEC QL NAA+PROBE: SIGNIFICANT CHANGE UP
HCOV HKU1 RNA SPEC QL NAA+PROBE: SIGNIFICANT CHANGE UP
HCOV NL63 RNA SPEC QL NAA+PROBE: SIGNIFICANT CHANGE UP
HCOV OC43 RNA SPEC QL NAA+PROBE: SIGNIFICANT CHANGE UP
HCT VFR BLD CALC: 16.7 % — CRITICAL LOW (ref 39–50)
HCT VFR BLD CALC: 17.4 % — CRITICAL LOW (ref 39–50)
HGB BLD-MCNC: 4.4 G/DL — CRITICAL LOW (ref 13–17)
HGB BLD-MCNC: 5 G/DL — CRITICAL LOW (ref 13–17)
HIV 1+2 AB+HIV1 P24 AG SERPL QL IA: SIGNIFICANT CHANGE UP
HMPV RNA SPEC QL NAA+PROBE: SIGNIFICANT CHANGE UP
HPIV1 RNA SPEC QL NAA+PROBE: SIGNIFICANT CHANGE UP
HPIV2 RNA SPEC QL NAA+PROBE: SIGNIFICANT CHANGE UP
HPIV3 RNA SPEC QL NAA+PROBE: SIGNIFICANT CHANGE UP
HPIV4 RNA SPEC QL NAA+PROBE: SIGNIFICANT CHANGE UP
HYPOCHROMIA BLD QL: SIGNIFICANT CHANGE UP
IANC: 3.63 K/UL — SIGNIFICANT CHANGE UP (ref 1.5–8.5)
IANC: 4.42 K/UL — SIGNIFICANT CHANGE UP (ref 1.5–8.5)
IMM GRANULOCYTES NFR BLD AUTO: 1.7 % — HIGH (ref 0–1.5)
INR BLD: 1.18 RATIO — HIGH (ref 0.88–1.16)
LYMPHOCYTES # BLD AUTO: 0.4 K/UL — LOW (ref 1–3.3)
LYMPHOCYTES # BLD AUTO: 0.53 K/UL — LOW (ref 1–3.3)
LYMPHOCYTES # BLD AUTO: 11 % — LOW (ref 13–44)
LYMPHOCYTES # BLD AUTO: 7 % — LOW (ref 13–44)
MACROCYTES BLD QL: SLIGHT — SIGNIFICANT CHANGE UP
MANUAL SMEAR VERIFICATION: SIGNIFICANT CHANGE UP
MCHC RBC-ENTMCNC: 16.1 PG — LOW (ref 27–34)
MCHC RBC-ENTMCNC: 18.9 PG — LOW (ref 27–34)
MCHC RBC-ENTMCNC: 26.3 GM/DL — LOW (ref 32–36)
MCHC RBC-ENTMCNC: 28.7 GM/DL — LOW (ref 32–36)
MCV RBC AUTO: 60.9 FL — LOW (ref 80–100)
MCV RBC AUTO: 65.7 FL — LOW (ref 80–100)
MICROCYTES BLD QL: SIGNIFICANT CHANGE UP
MONOCYTES # BLD AUTO: 0.23 K/UL — SIGNIFICANT CHANGE UP (ref 0–0.9)
MONOCYTES # BLD AUTO: 0.43 K/UL — SIGNIFICANT CHANGE UP (ref 0–0.9)
MONOCYTES NFR BLD AUTO: 4 % — SIGNIFICANT CHANGE UP (ref 2–14)
MONOCYTES NFR BLD AUTO: 9 % — SIGNIFICANT CHANGE UP (ref 2–14)
NEUTROPHILS # BLD AUTO: 3.63 K/UL — SIGNIFICANT CHANGE UP (ref 1.8–7.4)
NEUTROPHILS # BLD AUTO: 4.88 K/UL — SIGNIFICANT CHANGE UP (ref 1.8–7.4)
NEUTROPHILS NFR BLD AUTO: 75.6 % — SIGNIFICANT CHANGE UP (ref 43–77)
NEUTROPHILS NFR BLD AUTO: 86 % — HIGH (ref 43–77)
NRBC # BLD: 0 /100 WBCS — SIGNIFICANT CHANGE UP
NRBC # BLD: 0 /100 — SIGNIFICANT CHANGE UP (ref 0–0)
NRBC # FLD: 0.02 K/UL — HIGH
OVALOCYTES BLD QL SMEAR: SLIGHT — SIGNIFICANT CHANGE UP
PLAT MORPH BLD: NORMAL — SIGNIFICANT CHANGE UP
PLATELET # BLD AUTO: 11 K/UL — CRITICAL LOW (ref 150–400)
PLATELET # BLD AUTO: 15 K/UL — CRITICAL LOW (ref 150–400)
PLATELET COUNT - ESTIMATE: ABNORMAL
POIKILOCYTOSIS BLD QL AUTO: SIGNIFICANT CHANGE UP
POTASSIUM SERPL-MCNC: 3.8 MMOL/L — SIGNIFICANT CHANGE UP (ref 3.5–5.3)
POTASSIUM SERPL-MCNC: 4 MMOL/L — SIGNIFICANT CHANGE UP (ref 3.5–5.3)
POTASSIUM SERPL-SCNC: 3.8 MMOL/L — SIGNIFICANT CHANGE UP (ref 3.5–5.3)
POTASSIUM SERPL-SCNC: 4 MMOL/L — SIGNIFICANT CHANGE UP (ref 3.5–5.3)
PROT SERPL-MCNC: 5.8 G/DL — LOW (ref 6–8.3)
PROT SERPL-MCNC: 7.4 G/DL — SIGNIFICANT CHANGE UP (ref 6–8.3)
PROTHROM AB SERPL-ACNC: 13.3 SEC — SIGNIFICANT CHANGE UP (ref 10.6–13.6)
RAPID RVP RESULT: SIGNIFICANT CHANGE UP
RBC # BLD: 2.65 M/UL — LOW (ref 4.2–5.8)
RBC # BLD: 2.74 M/UL — LOW (ref 4.2–5.8)
RBC # FLD: 31.7 % — HIGH (ref 10.3–14.5)
RBC # FLD: 33.9 % — HIGH (ref 10.3–14.5)
RBC BLD AUTO: ABNORMAL
RH IG SCN BLD-IMP: POSITIVE — SIGNIFICANT CHANGE UP
RSV RNA SPEC QL NAA+PROBE: SIGNIFICANT CHANGE UP
RV+EV RNA SPEC QL NAA+PROBE: SIGNIFICANT CHANGE UP
SARS-COV-2 RNA SPEC QL NAA+PROBE: SIGNIFICANT CHANGE UP
SCHISTOCYTES BLD QL AUTO: SIGNIFICANT CHANGE UP
SODIUM SERPL-SCNC: 141 MMOL/L — SIGNIFICANT CHANGE UP (ref 135–145)
SODIUM SERPL-SCNC: 142 MMOL/L — SIGNIFICANT CHANGE UP (ref 135–145)
WBC # BLD: 4.8 K/UL — SIGNIFICANT CHANGE UP (ref 3.8–10.5)
WBC # BLD: 5.67 K/UL — SIGNIFICANT CHANGE UP (ref 3.8–10.5)
WBC # FLD AUTO: 4.8 K/UL — SIGNIFICANT CHANGE UP (ref 3.8–10.5)
WBC # FLD AUTO: 5.67 K/UL — SIGNIFICANT CHANGE UP (ref 3.8–10.5)

## 2021-04-13 PROCEDURE — 99053 MED SERV 10PM-8AM 24 HR FAC: CPT

## 2021-04-13 PROCEDURE — 86077 PHYS BLOOD BANK SERV XMATCH: CPT

## 2021-04-13 PROCEDURE — 99285 EMERGENCY DEPT VISIT HI MDM: CPT

## 2021-04-13 PROCEDURE — 99223 1ST HOSP IP/OBS HIGH 75: CPT

## 2021-04-13 PROCEDURE — 74177 CT ABD & PELVIS W/CONTRAST: CPT | Mod: 26

## 2021-04-13 RX ORDER — ONDANSETRON 8 MG/1
4 TABLET, FILM COATED ORAL ONCE
Refills: 0 | Status: COMPLETED | OUTPATIENT
Start: 2021-04-13 | End: 2021-04-13

## 2021-04-13 RX ORDER — SODIUM CHLORIDE 9 MG/ML
1000 INJECTION, SOLUTION INTRAVENOUS
Refills: 0 | Status: DISCONTINUED | OUTPATIENT
Start: 2021-04-13 | End: 2021-04-20

## 2021-04-13 RX ORDER — DIPHENHYDRAMINE HCL 50 MG
25 CAPSULE ORAL DAILY
Refills: 0 | Status: DISCONTINUED | OUTPATIENT
Start: 2021-04-15 | End: 2021-04-14

## 2021-04-13 RX ORDER — SODIUM CHLORIDE 9 MG/ML
1000 INJECTION, SOLUTION INTRAVENOUS ONCE
Refills: 0 | Status: COMPLETED | OUTPATIENT
Start: 2021-04-13 | End: 2021-04-13

## 2021-04-13 RX ORDER — SODIUM CHLORIDE 9 MG/ML
1000 INJECTION INTRAMUSCULAR; INTRAVENOUS; SUBCUTANEOUS ONCE
Refills: 0 | Status: COMPLETED | OUTPATIENT
Start: 2021-04-13 | End: 2021-04-13

## 2021-04-13 RX ORDER — PANTOPRAZOLE SODIUM 20 MG/1
80 TABLET, DELAYED RELEASE ORAL ONCE
Refills: 0 | Status: COMPLETED | OUTPATIENT
Start: 2021-04-13 | End: 2021-04-13

## 2021-04-13 RX ORDER — PANTOPRAZOLE SODIUM 20 MG/1
8 TABLET, DELAYED RELEASE ORAL
Qty: 80 | Refills: 0 | Status: DISCONTINUED | OUTPATIENT
Start: 2021-04-13 | End: 2021-04-19

## 2021-04-13 RX ORDER — ACETAMINOPHEN 500 MG
650 TABLET ORAL ONCE
Refills: 0 | Status: COMPLETED | OUTPATIENT
Start: 2021-04-13 | End: 2021-04-14

## 2021-04-13 RX ORDER — IMMUNE GLOBULIN (HUMAN) 10 G/100ML
75 INJECTION INTRAVENOUS; SUBCUTANEOUS DAILY
Refills: 0 | Status: COMPLETED | OUTPATIENT
Start: 2021-04-13 | End: 2021-04-15

## 2021-04-13 RX ADMIN — PANTOPRAZOLE SODIUM 80 MILLIGRAM(S): 20 TABLET, DELAYED RELEASE ORAL at 08:20

## 2021-04-13 RX ADMIN — PANTOPRAZOLE SODIUM 10 MG/HR: 20 TABLET, DELAYED RELEASE ORAL at 08:06

## 2021-04-13 RX ADMIN — ONDANSETRON 4 MILLIGRAM(S): 8 TABLET, FILM COATED ORAL at 08:06

## 2021-04-13 RX ADMIN — SODIUM CHLORIDE 1000 MILLILITER(S): 9 INJECTION, SOLUTION INTRAVENOUS at 12:28

## 2021-04-13 RX ADMIN — ONDANSETRON 4 MILLIGRAM(S): 8 TABLET, FILM COATED ORAL at 09:47

## 2021-04-13 RX ADMIN — SODIUM CHLORIDE 1000 MILLILITER(S): 9 INJECTION INTRAMUSCULAR; INTRAVENOUS; SUBCUTANEOUS at 08:06

## 2021-04-13 RX ADMIN — PANTOPRAZOLE SODIUM 10 MG/HR: 20 TABLET, DELAYED RELEASE ORAL at 20:06

## 2021-04-13 RX ADMIN — SODIUM CHLORIDE 100 MILLILITER(S): 9 INJECTION, SOLUTION INTRAVENOUS at 17:41

## 2021-04-13 NOTE — ED ADULT NURSE REASSESSMENT NOTE - NS ED NURSE REASSESS COMMENT FT1
spoke to TIFFANY Deleon, as per mary, "I don't know who is going to take this patient". will be escalated to ED throughput ANN Vargas

## 2021-04-13 NOTE — CONSULT NOTE ADULT - ASSESSMENT
patient is a 55 year old male with PMhx of PUD s/p distal gastrectomy with Billroth II reconstruction c/b jejunogastric intussusception (2017), iron deficiency anemia  and ITP who presents with hematemesis and

## 2021-04-13 NOTE — CONSULT NOTE ADULT - PROBLEM SELECTOR RECOMMENDATION 3
ITP   with acute GI bleeding   transfuse to keep plt above 50  high risk for prednisone, hematology eval appreciated, plan for IVIG, premedf per recs   monitor plt level , CBC Q12hrs

## 2021-04-13 NOTE — CONSULT NOTE ADULT - SUBJECTIVE AND OBJECTIVE BOX
Chief Complaint:  Patient is a 55y old  Male who presents with a chief complaint of GI bleed (13 Apr 2021 12:40)      HPI: 55 M Hx of PUD s/p Bilroth II gastrojejunostomy for perforation (>25 years ago) with multiple hospitalizations for UGIB (2014, 2016, 2017) now presenting for CGE/hematemesis x 1 day.     Pt endorses significant nausea yesterday after having hot cocoa, yams, sweet potatoes etc -- started vomiting food contents x several times with unclear CGE, progressed to hematemesis (with "flecks of blood") by 5am.     Allergies:  latex (Rash)  No Known Drug Allergies  shellfish ([place])      Home Medications:    Hospital Medications:  lactated ringers. 1000 milliLiter(s) IV Continuous <Continuous>  pantoprazole Infusion 8 mG/Hr IV Continuous <Continuous>      PMHX/PSHX:  Gastric ulcer    Chronic anemia    S/P partial gastrectomy    History of Billroth II operation        Family history:  No pertinent family history in first degree relatives        Denies family history of colon cancer/polyps, stomach cancer/polyps, pancreatic cancer/masses, liver cancer/disease, ovarian cancer and endometrial cancer.    Social History:     Tob: Denies  EtOH: Denies  Illicit Drugs: Denies    ROS:     General:  No wt loss, fevers, chills, night sweats, fatigue  Eyes:  Good vision, no reported pain  ENT:  No sore throat, pain, runny nose, dysphagia  CV:  No pain, palpitations, hypo/hypertension  Pulm:  No dyspnea, cough, tachypnea, wheezing  GI:  No pain, No nausea, No vomiting, No diarrhea, No constipation, No weight loss, No fever, No pruritis, No rectal bleeding, No tarry stools, No dysphagia,  :  No pain, bleeding, incontinence, nocturia  Muscle:  No pain, weakness  Neuro:  No weakness, tingling, memory problems  Psych:  No fatigue, insomnia, mood problems, depression  Endocrine:  No polyuria, polydipsia, cold/heat intolerance  Heme:  No petechiae, ecchymosis, easy bruisability  Skin:  No rash, tattoos, scars, edema    PHYSICAL EXAM:     GENERAL:  No acute distress  HEENT:  Normocephalic/atraumatic, no scleral icterus  CHEST:  Clear to auscultation bilaterally, no wheezes/rales/ronchi, no accessory muscle use  HEART:  Regular rate and rhythm, no murmurs/rubs/gallops  ABDOMEN:  Soft, non-tender, non-distended, normoactive bowel sounds,  no masses, no hepato-splenomegaly, no signs of chronic liver disease  EXTREMITIES: No cyanosis, clubbing, or edema  SKIN:  No rash/erythema/ecchymoses/petechiae/wounds/abscess/warm/dry  NEURO:  Alert and oriented x 3, no asterixis    Vital Signs:  Vital Signs Last 24 Hrs  T(C): 37 (13 Apr 2021 14:05), Max: 37 (13 Apr 2021 13:05)  T(F): 98.6 (13 Apr 2021 14:05), Max: 98.6 (13 Apr 2021 13:05)  HR: 89 (13 Apr 2021 14:05) (72 - 89)  BP: 132/84 (13 Apr 2021 14:05) (115/64 - 155/60)  BP(mean): --  RR: 17 (13 Apr 2021 14:05) (15 - 20)  SpO2: 100% (13 Apr 2021 14:05) (99% - 100%)  Daily Height in cm: 185.42 (13 Apr 2021 07:04)    Daily     LABS:                        4.4    5.67  )-----------( 11       ( 13 Apr 2021 09:19 )             16.7     Mean Cell Volume: 60.9 fL (04-13-21 @ 09:19)    04-13    141  |  103  |  3<L>  ----------------------------<  112<H>  4.0   |  29  |  0.62    Ca    9.0      13 Apr 2021 09:19    TPro  7.4  /  Alb  4.0  /  TBili  0.2  /  DBili  x   /  AST  23  /  ALT  19  /  AlkPhos  86  04-13    LIVER FUNCTIONS - ( 13 Apr 2021 09:19 )  Alb: 4.0 g/dL / Pro: 7.4 g/dL / ALK PHOS: 86 U/L / ALT: 19 U/L / AST: 23 U/L / GGT: x           PT/INR - ( 13 Apr 2021 09:19 )   PT: 13.3 sec;   INR: 1.18 ratio         PTT - ( 13 Apr 2021 09:19 )  PTT:29.2 sec                            4.4    5.67  )-----------( 11       ( 13 Apr 2021 09:19 )             16.7       Imaging:      < from: Upper Endoscopy (12.01.17 @ 09:44) >  Findings:       The oropharynx was normal.       Localized mild mucosal changes characterized by punctated white spots        were found in the middle third of the esophagus suggestive for        eosinophilic esophagitis. Biopsies were taken with a cold forceps for        histology.       The exam of the esophagus was otherwise normal.       Evidence of a patent Billroth II gastrojejunostomy was found. The        gastrojejunal anastomosis was characterized by friable mucosa. This was        traversed. The efferent limb was examined. The afferent limb was        examined.       Diffuse mildly friable mucosa with contact bleeding was found in the        gastric body. Biopsies were taken with a cold forceps forhistology.       A bleeding superficial mucosal tear of unknown etiology was found in the        gastric body. This measured 4 mm in length.       The exam of the stomach was otherwise normal.       The examined jejunum was normal. Biopsies for histology were taken with        a cold forceps for evaluation of celiac disease.                                                                                   Impression:          - Normal oropharynx.                       - Punctate white spotted mucosa in the esophagus.                        Biopsied.                       - Patent Billroth II gastrojejunostomy was found,                        characterized by friable mucosa.                       - Friable gastric mucosa. Biopsied.             - Superficial mucosal tear in the gastric body.                       - Normal examined jejunum. Biopsied.  Recommendation:      - [facility] patient to [facility] for [facility] care.                       - Use a proton pump inhibitor PO daily.                       - Await pathology results.                       - Resume previous diet.                       - Please check iron studies, Vitamin B12, methylmalonic                        acid level.                       - Return to GI clinic in 2 weeks.                                                                                     < end of copied text >  < from: Upper Endoscopy (08.09.16 @ 09:30) >  Findings:       The examined esophagus was normal.       The Z-line was regular and was found 35 cm from the incisors.       Evidence of a Billroth II gastrojejunostomy was found. The gastrojejunal        anastomosis was characterized by friable mucosa at gastric remnant with        focal area of oozing. Treated with one endoclip and APC with control of        bleeding. The efferent limb was examined. The afferent limb was examined.                                            Impression:          - Normal esophagus.                       - Z-line regular, 35 cm from the incisors.                       - Billroth II gastrojejunostomy was found, characterized                        by focal area of friable mucosa at gastric remnant.                       - No specimens collected.  Recommendation:      - Advance diet as tolerated.                       - Use Protonix (pantoprazole) 40 mg PO BID.                       - Plan for repeat endoscopy and possible colonoscoy when                        platelets above 50,000                                                                                     < end of copied text >  < from: Upper Endoscopy (10.17.14 @ 11:33) >  Impression:          - Normal esophagus.                       - Erythematous mucosa in the stomach, wit contact                        bleeding, small contusion. No active bleeding. Body was                        biopsied for the evaluation of H.P.                       - Patent Billroth II gastrojejunostomy was found.                        Efferent limb erythema, congestion. This was biopsied.  Recommendation:      - Return patient to ICU for observation.                       - Clear liquid diet today.                       - Give Protonix (pantoprazole): initiate therapy with 8         mg/hr IV by continuous infusion.                       - Check hemoglobin q 8 hours until stable.                       - Hematology evaluation for thrombocytopenia.                       - Maintain plt count >50 with active bleeding.    < end of copied text >           Chief Complaint:  Patient is a 55y old  Male who presents with a chief complaint of GI bleed (13 Apr 2021 12:40)      HPI: 55 M Hx of PUD s/p Bilroth II gastrojejunostomy for perforation (>25 years ago) with multiple hospitalizations for UGIB (2014, 2016, 2017) now presenting for CGE/hematemesis x 1 day.     Pt endorses significant nausea yesterday after having hot cocoa, yams, sweet potatoes etc -- started vomiting food contents x several times with unclear CGE, progressed to hematemesis (with "flecks of blood") by 5am. Associated w/ abd pain. Reports brown BM last night. Denies NSAID use.     In ED VSS   5.67 > 4.4 < 11   INR 1.18   BUN 3/ Cr 0.62    Allergies:  latex (Rash)  No Known Drug Allergies  shellfish ([place])      Home Medications:    Hospital Medications:  lactated ringers. 1000 milliLiter(s) IV Continuous <Continuous>  pantoprazole Infusion 8 mG/Hr IV Continuous <Continuous>      PMHX/PSHX:  Gastric ulcer    Chronic anemia    S/P partial gastrectomy    History of Billroth II operation        Family history:  No pertinent family history in first degree relatives        Denies family history of colon cancer/polyps, stomach cancer/polyps, pancreatic cancer/masses, liver cancer/disease, ovarian cancer and endometrial cancer.    Social History:     Tob: Denies  EtOH: Denies  Illicit Drugs: Denies    ROS:     General:  No wt loss, fevers, chills, night sweats, fatigue  Eyes:  Good vision, no reported pain  ENT:  No sore throat, pain, runny nose, dysphagia  CV:  No pain, palpitations, hypo/hypertension  Pulm:  No dyspnea, cough, tachypnea, wheezing  GI:  No pain, No nausea, No vomiting, No diarrhea, No constipation, No weight loss, No fever, No pruritis, No rectal bleeding, No tarry stools, No dysphagia,  :  No pain, bleeding, incontinence, nocturia  Muscle:  No pain, weakness  Neuro:  No weakness, tingling, memory problems  Psych:  No fatigue, insomnia, mood problems, depression  Endocrine:  No polyuria, polydipsia, cold/heat intolerance  Heme:  No petechiae, ecchymosis, easy bruisability  Skin:  No rash, tattoos, scars, edema    PHYSICAL EXAM:       Vital Signs:  Vital Signs Last 24 Hrs  T(C): 37 (13 Apr 2021 14:05), Max: 37 (13 Apr 2021 13:05)  T(F): 98.6 (13 Apr 2021 14:05), Max: 98.6 (13 Apr 2021 13:05)  HR: 89 (13 Apr 2021 14:05) (72 - 89)  BP: 132/84 (13 Apr 2021 14:05) (115/64 - 155/60)  BP(mean): --  RR: 17 (13 Apr 2021 14:05) (15 - 20)  SpO2: 100% (13 Apr 2021 14:05) (99% - 100%)  Daily Height in cm: 185.42 (13 Apr 2021 07:04)    Daily     GENERAL:  No acute distress,   HEENT:  Normocephalic/atraumatic, no scleral icterus  CHEST:  no accessory muscle use  HEART:  Regular rate and rhythm, no murmurs/rubs/gallops  ABDOMEN:  Soft, non-tender, non-distended, normoactive bowel sounds,  +surgical scars  EXTREMITIES: No cyanosis, clubbing, or edema  SKIN:  No rash/erythema/ecchymoses/petechiae/wounds/abscess/warm/dry  NEURO:  Alert and oriented x 3, no asterixis      LABS:                        4.4    5.67  )-----------( 11       ( 13 Apr 2021 09:19 )             16.7     Mean Cell Volume: 60.9 fL (04-13-21 @ 09:19)    04-13    141  |  103  |  3<L>  ----------------------------<  112<H>  4.0   |  29  |  0.62    Ca    9.0      13 Apr 2021 09:19    TPro  7.4  /  Alb  4.0  /  TBili  0.2  /  DBili  x   /  AST  23  /  ALT  19  /  AlkPhos  86  04-13    LIVER FUNCTIONS - ( 13 Apr 2021 09:19 )  Alb: 4.0 g/dL / Pro: 7.4 g/dL / ALK PHOS: 86 U/L / ALT: 19 U/L / AST: 23 U/L / GGT: x           PT/INR - ( 13 Apr 2021 09:19 )   PT: 13.3 sec;   INR: 1.18 ratio         PTT - ( 13 Apr 2021 09:19 )  PTT:29.2 sec                            4.4    5.67  )-----------( 11       ( 13 Apr 2021 09:19 )             16.7       Imaging:      EXAM:  CT ABDOMEN AND PELVIS IC        PROCEDURE DATE:  Apr 13 2021         INTERPRETATION:  CLINICAL INFORMATION: History of intussusception and gastrointestinal bleeding. Suspected bowel obstruction.    COMPARISON: 11/30/2017    CONTRAST/COMPLICATIONS:  IV Contrast: Omnipaque 350  90 cc administered   10 cc discarded  Oral Contrast: NONE  Complications: None reported at time of study completion    PROCEDURE:  CT of the Abdomen and Pelvis was performed.  Sagittal and coronal reformats were performed.    FINDINGS:  LOWER CHEST: Within normal limits.    LIVER: Within normal limits.  BILE DUCTS: Normal caliber.  GALLBLADDER: Within normal limits.  SPLEEN: Within normal limits.  PANCREAS: Within normal limits.  ADRENALS: Within normal limits.  KIDNEYS/URETERS: Within normal limits.    BLADDER: Within normal limits.  REPRODUCTIVE ORGANS: Prostate within normal limits.    BOWEL: Status post distal gastrectomy and gastrojejunostomy with jejunogastric intussusception at the level of the gastrojejunostomy similar to findings present on prior examination 11/30/2017. Prominent distention of the stomach with reflux of fluid into the lower esophagus, consistent with gastric outlet obstruction.  PERITONEUM: No ascites.  VESSELS: Within normal limits.  RETROPERITONEUM/LYMPH NODES: No lymphadenopathy.  ABDOMINAL WALL: Within normal limits.  BONES: Sclerotic lesions in the L5 vertebral body and right iliac bone, likely bone islands.    IMPRESSION:  Status post Bilroth II with jejunogastric intussusception at the level of the gastrojejunostomy.  Fluid distention of the stomach and esophagus, consistent with gastric outlet obstruction.    < from: Upper Endoscopy (12.01.17 @ 09:44) >  Findings:       The oropharynx was normal.       Localized mild mucosal changes characterized by punctated white spots        were found in the middle third of the esophagus suggestive for        eosinophilic esophagitis. Biopsies were taken with a cold forceps for        histology.       The exam of the esophagus was otherwise normal.       Evidence of a patent Billroth II gastrojejunostomy was found. The        gastrojejunal anastomosis was characterized by friable mucosa. This was        traversed. The efferent limb was examined. The afferent limb was        examined.       Diffuse mildly friable mucosa with contact bleeding was found in the        gastric body. Biopsies were taken with a cold forceps forhistology.       A bleeding superficial mucosal tear of unknown etiology was found in the        gastric body. This measured 4 mm in length.       The exam of the stomach was otherwise normal.       The examined jejunum was normal. Biopsies for histology were taken with        a cold forceps for evaluation of celiac disease.                                                                                   Impression:          - Normal oropharynx.                       - Punctate white spotted mucosa in the esophagus.                        Biopsied.                       - Patent Billroth II gastrojejunostomy was found,                        characterized by friable mucosa.                       - Friable gastric mucosa. Biopsied.             - Superficial mucosal tear in the gastric body.                       - Normal examined jejunum. Biopsied.  Recommendation:      - Return patient to [facility] for [facility].                       - Use a proton pump inhibitor PO daily.                       - Await pathology results.                       - Resume previous diet.                       - Please check iron studies, Vitamin B12, methylmalonic                        acid level.                       - Return to GI clinic in 2 weeks.                                                                                     < end of copied text >  < from: Upper Endoscopy (08.09.16 @ 09:30) >  Findings:       The examined esophagus was normal.       The Z-line was regular and was found 35 cm from the incisors.       Evidence of a Billroth II gastrojejunostomy was found. The gastrojejunal        anastomosis was characterized by friable mucosa at gastric remnant with        focal area of oozing. Treated with one endoclip and APC with control of        bleeding. The efferent limb was examined. The afferent limb was examined.                                            Impression:          - Normal esophagus.                       - Z-line regular, 35 cm from the incisors.                       - Billroth II gastrojejunostomy was found, characterized                        by focal area of friable mucosa at gastric remnant.                       - No specimens collected.  Recommendation:      - Advance diet as tolerated.                       - Use Protonix (pantoprazole) 40 mg PO BID.                       - Plan for repeat endoscopy and possible colonoscoy when                        platelets above 50,000                                                                                     < end of copied text >  < from: Upper Endoscopy (10.17.14 @ 11:33) >  Impression:          - Normal esophagus.                       - Erythematous mucosa in the stomach, wit contact                        bleeding, small contusion. No active bleeding. Body was                        biopsied for the evaluation of H.P.                       - Patent Billroth II gastrojejunostomy was found.                        Efferent limb erythema, congestion. This was biopsied.  Recommendation:      - Return patient to ICU for observation.                       - Clear liquid diet today.                       - Give Protonix (pantoprazole): initiate therapy with 8         mg/hr IV by continuous infusion.                       - Check hemoglobin q 8 hours until stable.                       - Hematology evaluation for thrombocytopenia.                       - Maintain plt count >50 with active bleeding.    < end of copied text >

## 2021-04-13 NOTE — H&P ADULT - NSHPPHYSICALEXAM_GEN_ALL_CORE
Vital Signs Last 24 Hrs  T(C): 36.9 (13 Apr 2021 07:43), Max: 36.9 (13 Apr 2021 07:04)  T(F): 98.5 (13 Apr 2021 07:43), Max: 98.5 (13 Apr 2021 07:04)  HR: 72 (13 Apr 2021 11:52) (72 - 86)  BP: 135/68 (13 Apr 2021 11:52) (115/64 - 155/60)  BP(mean): --  RR: 15 (13 Apr 2021 11:52) (15 - 20)  SpO2: 99% (13 Apr 2021 10:56) (99% - 100%)    Exam:  Gen: NAD, resting in bed, alert and responding appropriately  Resp: Airway patent, non-labored respirations  Abd: midline incisional scar above umbilicus noted. Soft, ND, NT, no rebound or guarding  Ext: No edema, WWP  Neuro: AAOx3, no focal deficits

## 2021-04-13 NOTE — H&P ADULT - HISTORY OF PRESENT ILLNESS
Patient is a 55y old  Male who presents with a chief complaint of hematemesis    HPI:  55 year old male with hx of PUD s/p distal gastrectomy with Billroth II reconstruction (over 20 years ago in [place]), c/b jejunogastric intussusception (2017) s/p EGD, chronic anemia (baseline hgb 8-9), presents with hematemesis x 1 day. Patient reports started vomiting, initially nonbloody later bloody, after having hot tea and hot chocolate at 3 am. Bloody emesis prompted him to ED this AM. Denies abdominal pain. Report diarrhea, no bloody or dark stool. Passing flatus. Denies fevers/chills, denies lightheadedness/dizziness, denies SOB/chest pain. Does not recall having colonoscopy. Never worked up for anemia    In ED, patient hemodynamically stable. Lab significant for anemia hgb 4.4, thrombocytopenia. Mentating well. CT A/P shows distal gastrectomy with Billroth 2  with jejunogastric intussusception

## 2021-04-13 NOTE — ED PROVIDER NOTE - OBJECTIVE STATEMENT
54 y/o male pmh PUD s/p Billroth II procedure x 20 years ago c/o hematemesis x today. Pt admits to waking up this AM with abd pain and vomiting. Pt states that the first several episodes were non bloody but now the last 3-4 episodes are BRB. Pt admits to epigastric abd pain as well. Pt states that he had a similar presentation x4 years ago and was admitted. Pt denies taking any OTC or prescribed meds at home. Has not seen a GI doctor in several years. Pt denies chest pain, sob, diarrhea, melena, numbness, tingling, weakness, dizziness, syncope, fever or chills.

## 2021-04-13 NOTE — CONSULT NOTE ADULT - SUBJECTIVE AND OBJECTIVE BOX
Patient is a 55 year old male with Pmhx of PUD s/p distal gastrectomy with Billroth II reconstruction (over 20 years ago in [place]), c/b jejunogastric intussusception (2017) s/p EGD, chronic anemia (baseline hgb 8-9), presents with hematemesis x 1 day. Patient reports started vomiting, initially nonbloody later bloody, after having hot tea and hot chocolate at 3 am. Bloody emesis prompted him to ED this AM. Denies abdominal pain. Report diarrhea, no bloody or dark stool. Passing flatus. Denies fevers/chills, denies lightheadedness/dizziness, denies SOB/chest pain. Does not recall having colonoscopy. Never worked up for anemia  In ED, patient hemodynamically stable. Lab significant for anemia hgb 4.4, thrombocytopenia. Mentating well. CT A/P shows distal gastrectomy with Billroth 2  with jejunogastric intussusception    REVIEW OF SYSTEMS:    CONSTITUTIONAL: No weakness, fevers or chills  EYES/ENT: No visual changes;  No vertigo or throat pain   NECK: No pain or stiffness  RESPIRATORY: No cough, wheezing, hemoptysis; No shortness of breath  CARDIOVASCULAR: No chest pain or palpitations  GASTROINTESTINAL: + abdominal pain  GENITOURINARY: No dysuria, frequency or hematuria  NEUROLOGICAL: No numbness or weakness  SKIN: No itching, burning, rashes, or lesions   All other review of systems is negative unless indicated above.    PAST MEDICAL & SURGICAL HISTORY:  Gastric ulcer  Chronic anemia  History of Billroth II operation    MEDICATIONS  (STANDING):  lactated ringers. 1000 milliLiter(s) (100 mL/Hr) IV Continuous <Continuous>  pantoprazole Infusion 8 mG/Hr (10 mL/Hr) IV Continuous <Continuous>    Allergies    latex (Rash)  No Known Drug Allergies  shellfish ([place])  Intolerances    Vital Signs Last 24 Hrs  T(C): 36.8 (13 Apr 2021 17:10), Max: 37 (13 Apr 2021 13:05)  T(F): 98.2 (13 Apr 2021 17:10), Max: 98.6 (13 Apr 2021 13:05)  HR: 73 (13 Apr 2021 17:10) (72 - 89)  BP: 134/70 (13 Apr 2021 17:10) (115/64 - 155/60)  BP(mean): --  RR: 16 (13 Apr 2021 17:10) (15 - 20)  SpO2: 100% (13 Apr 2021 17:10) (99% - 100%)    Appearance: Normal	  HEENT:   Normal oral mucosa, PERRL, EOMI	  Lymphatic: No lymphadenopathy , no edema  Cardiovascular: Normal S1 S2, No JVD, No murmurs , Peripheral pulses palpable 2+ bilaterally  Respiratory: Lungs clear to auscultation, normal effort 	  Gastrointestinal:  Soft, pain on palpation   Skin: No rashes, No ecchymoses, No cyanosis, warm to touch  Musculoskeletal: Normal range of motion, normal strength  Psychiatry:  Mood & affect appropriate  Ext: No edema                          4.4    5.67  )-----------( 11       ( 13 Apr 2021 09:19 )             16.7               04-13    141  |  103  |  3<L>  ----------------------------<  112<H>  4.0   |  29  |  0.62    Ca    9.0      13 Apr 2021 09:19    TPro  7.4  /  Alb  4.0  /  TBili  0.2  /  DBili  x   /  AST  23  /  ALT  19  /  AlkPhos  86  04-13    PT/INR - ( 13 Apr 2021 09:19 )   PT: 13.3 sec;   INR: 1.18 ratio         PTT - ( 13 Apr 2021 09:19 )  PTT:29.2 sec                < from: CT Abdomen and Pelvis w/ IV Cont (04.13.21 @ 11:06) >    IMPRESSION:  Status post Bilroth II with jejunogastric intussusception at the level of the gastrojejunostomy.  Fluid distention of the stomach and esophagus, consistent with gastric outlet obstruction.

## 2021-04-13 NOTE — ED ADULT NURSE REASSESSMENT NOTE - NS ED NURSE REASSESS COMMENT FT1
as per Elenor in bloodbank, antibody screening will not be complete for additional 20 minutes. bloodbank aware pt is in need of blood due to low HG. ARGELIA Lynne made aware.

## 2021-04-13 NOTE — ED ADULT NURSE REASSESSMENT NOTE - NS ED NURSE REASSESS COMMENT FT1
receiving pt from night RN. pt aox4, c/o nausea, bright red blood in emesis. pt denies chest pain, sob. CCM in place

## 2021-04-13 NOTE — H&P ADULT - ATTENDING COMMENTS
Patient seen and examined  Labs and imaging reviewed  Recurrent intussusception at , last episodes 2017  Anemia, and thrombocytopenia  - Heme/onc consultation  - GI consult  - will likely benefit from surgical intervention this admission when medically optimized

## 2021-04-13 NOTE — CONSULT NOTE ADULT - SUBJECTIVE AND OBJECTIVE BOX
HPI:  Patient is a 55 year old male with hx of PUD s/p distal gastrectomy with Billroth II reconstruction c/b jejunogastric intussusception (2017), chronic anemia (baseline hgb 8-9), and ITP who presents with hematemesis. Per patient, he was on his usual state of health until yesterday when he started having episodes of bloody vomiting for which he came to the ED for evaluation. Upon arrival, patient was found to have severe anemia and thrombocytopenia for which hematology was consulted.    After looking back at prior records, patient has had a longstanding history of microcytic anemia 2/2 iron deficiency anemia from blood loss. Iron studies from 2017 showed a ferritin of 6. His thrombocytopenia has also been a chronic issue. He has had hematology evaluation in the past (cannot recall names) including a bone marrow biopsy in 2016 that was consistent with ITP. It is unclear if he has ever been on any treatment for this.     Allergies    latex (Rash)  No Known Drug Allergies  shellfish ([place])    Intolerances        MEDICATIONS  (STANDING):  lactated ringers. 1000 milliLiter(s) (100 mL/Hr) IV Continuous <Continuous>  pantoprazole Infusion 8 mG/Hr (10 mL/Hr) IV Continuous <Continuous>    MEDICATIONS  (PRN):      PAST MEDICAL & SURGICAL HISTORY:  Gastric ulcer    Chronic anemia    History of Billroth II operation        FAMILY HISTORY:  No pertinent family history in first degree relatives        SOCIAL HISTORY: No EtOH, no tobacco    REVIEW OF SYSTEMS:    CONSTITUTIONAL: No weakness, fevers or chills  EYES/ENT: No visual changes;  No vertigo or throat pain   NECK: No pain or stiffness  RESPIRATORY: No cough, wheezing, hemoptysis; No shortness of breath  CARDIOVASCULAR: No chest pain or palpitations  GASTROINTESTINAL: No abdominal or epigastric pain. No nausea, vomiting, or hematemesis; No diarrhea or constipation. No melena or hematochezia.  GENITOURINARY: No dysuria, frequency or hematuria  NEUROLOGICAL: No numbness or weakness  SKIN: No itching, burning, rashes, or lesions   All other review of systems is negative unless indicated above.    Height (cm): 185.4 (04-13 @ 07:04)    T(F): 98.6 (04-13-21 @ 14:05), Max: 98.6 (04-13-21 @ 13:05)  HR: 89 (04-13-21 @ 14:05)  BP: 132/84 (04-13-21 @ 14:05)  RR: 17 (04-13-21 @ 14:05)  SpO2: 100% (04-13-21 @ 14:05)  Wt(kg): --    GENERAL: NAD, well-developed  HEAD:  Atraumatic, Normocephalic  EYES: EOMI, PERRLA, conjunctiva and sclera clear  NECK: Supple, No JVD  CHEST/LUNG: Clear to auscultation bilaterally; No wheeze  HEART: Regular rate and rhythm; No murmurs, rubs, or gallops  ABDOMEN: Soft, Nontender, Nondistended; Bowel sounds present  EXTREMITIES:  2+ Peripheral Pulses, No clubbing, cyanosis, or edema  NEUROLOGY: non-focal  SKIN: No rashes or lesions                          4.4    5.67  )-----------( 11       ( 13 Apr 2021 09:19 )             16.7       04-13    141  |  103  |  3<L>  ----------------------------<  112<H>  4.0   |  29  |  0.62    Ca    9.0      13 Apr 2021 09:19    TPro  7.4  /  Alb  4.0  /  TBili  0.2  /  DBili  x   /  AST  23  /  ALT  19  /  AlkPhos  86  04-13           HPI:  Patient is a 55 year old male with hx of PUD s/p distal gastrectomy with Billroth II reconstruction c/b jejunogastric intussusception (2017), chronic anemia (baseline hgb 8-9), and ITP who presents with hematemesis. Per patient, he was on his usual state of health until yesterday when he started having episodes of bloody vomiting for which he came to the ED for evaluation. Upon arrival, patient was found to have severe anemia and thrombocytopenia for which hematology was consulted.    After looking back at prior records, patient has had a longstanding history of microcytic anemia 2/2 iron deficiency anemia from blood loss. Iron studies from 2017 showed a ferritin of 6. Per patient, he used to follow up at [facility] for iron deficiency and underwent IV iron infusions but years but was then told his stores normalized and he was not aware he should continue to follow up. In regards to  thrombocytopenia, this has also been a chronic issue. He has had hematology evaluation in the past including a bone marrow biopsy in 2016 that was consistent with ITP. It is unclear if he has ever been on any treatment for this as patient cannot recall.    During interview, patient was mentating well, denies sob, chest pain.     Allergies    latex (Rash)  No Known Drug Allergies  shellfish ([place])    Intolerances        MEDICATIONS  (STANDING):  lactated ringers. 1000 milliLiter(s) (100 mL/Hr) IV Continuous <Continuous>  pantoprazole Infusion 8 mG/Hr (10 mL/Hr) IV Continuous <Continuous>    MEDICATIONS  (PRN):      PAST MEDICAL & SURGICAL HISTORY:  Gastric ulcer    Chronic anemia    History of Billroth II operation        FAMILY HISTORY:  No pertinent family history in first degree relatives        SOCIAL HISTORY: No EtOH, no tobacco    REVIEW OF SYSTEMS:    CONSTITUTIONAL: No weakness, fevers or chills  EYES/ENT: No visual changes;  No vertigo or throat pain   NECK: No pain or stiffness  RESPIRATORY: No cough, wheezing, hemoptysis; No shortness of breath  CARDIOVASCULAR: No chest pain or palpitations  GASTROINTESTINAL: No abdominal or epigastric pain. No nausea, vomiting, or hematemesis; No diarrhea or constipation. No melena or hematochezia.  GENITOURINARY: No dysuria, frequency or hematuria  NEUROLOGICAL: No numbness or weakness  SKIN: No itching, burning, rashes, or lesions   All other review of systems is negative unless indicated above.    Height (cm): 185.4 (04-13 @ 07:04)    T(F): 98.6 (04-13-21 @ 14:05), Max: 98.6 (04-13-21 @ 13:05)  HR: 89 (04-13-21 @ 14:05)  BP: 132/84 (04-13-21 @ 14:05)  RR: 17 (04-13-21 @ 14:05)  SpO2: 100% (04-13-21 @ 14:05)  Wt(kg): --    GENERAL: NAD, well-developed  HEAD:  Atraumatic, Normocephalic  EYES: EOMI, PERRLA, conjunctiva and sclera clear  NECK: Supple, No JVD  CHEST/LUNG: Clear to auscultation bilaterally; No wheeze  HEART: Regular rate and rhythm; No murmurs, rubs, or gallops  ABDOMEN: Soft, Nontender, Nondistended; Bowel sounds present  EXTREMITIES:  2+ Peripheral Pulses, No clubbing, cyanosis, or edema  NEUROLOGY: non-focal  SKIN: No rashes or lesions                          4.4    5.67  )-----------( 11       ( 13 Apr 2021 09:19 )             16.7       04-13    141  |  103  |  3<L>  ----------------------------<  112<H>  4.0   |  29  |  0.62    Ca    9.0      13 Apr 2021 09:19    TPro  7.4  /  Alb  4.0  /  TBili  0.2  /  DBili  x   /  AST  23  /  ALT  19  /  AlkPhos  86  04-13

## 2021-04-13 NOTE — ED PROVIDER NOTE - NS ED MD DISPO ADMITTING SERVICE
"Anesthesia Transfer of Care Note    Patient: Cheyenne West    Procedure(s) Performed: Procedure(s) (LRB):  EGD (ESOPHAGOGASTRODUODENOSCOPY) (N/A)  COLONOSCOPY (N/A)    Patient location: PACU    Anesthesia Type: general    Transport from OR: Transported from OR on room air with adequate spontaneous ventilation    Post pain: adequate analgesia    Post assessment: no apparent anesthetic complications and tolerated procedure well    Post vital signs: stable    Level of consciousness: awake and alert    Nausea/Vomiting: no nausea/vomiting    Complications: none    Transfer of care protocol was followed      Last vitals:   Visit Vitals  /72 (BP Location: Right arm, Patient Position: Lying)   Pulse 62   Temp 36.4 °C (97.5 °F) (Skin)   Resp 18   Ht 4' 11" (1.499 m)   Wt 72.6 kg (160 lb)   SpO2 98%   Breastfeeding? No   BMI 32.32 kg/m²     " SURG

## 2021-04-13 NOTE — ED ADULT NURSE REASSESSMENT NOTE - NS ED NURSE REASSESS COMMENT FT1
as per Alisha in bloodbank, antibody screening still not complete, pt cannot receive blood product yet

## 2021-04-13 NOTE — CONSULT NOTE ADULT - PROBLEM SELECTOR RECOMMENDATION 5
DVT and gI PPX     Differential diagnosis and plan of care discussed with patient after the evaluation.   Advanced care planning/advanced directives discussed with patient/family. DNR status including forceful chest compressions to attempt to restart the heart, ventilator support/artificial breathing, electric shock, artificial nutrition, health care proxy, Molst form all discussed with pt. Pt wishes to consider.   OMT on six regions for acute somatic dysfunctions done at the bedside   Pain assessed and judicious use of narcotics when appropriate was discussed.  Importance of Fall prevention discussed.  Counseling on Smoking and Alcohol cessation was offered when appropriate.  Counseling on Diet, exercise, and medication compliance was done.

## 2021-04-13 NOTE — H&P ADULT - ASSESSMENT
55M with hx of PUD s/p distal gastrectomy with Billroth II reconstruction, c/b jejunogastric intussusception (2017) s/p EGD, chronic anemia, presents with hematemesis, likely 2/2 upper GI bleed from recurrent peptic ulcer    PLAN:   - Admit to surgery A team, Dr. Ronnie Garcia  - NPO  - transfuse pRBC, platelet  - Protonix BID  - consult GI for EGD  - consult hematology for anemia and thrombocytopenia workup  - given recurrent jejunogastric intussusception, will benefit from surgical repair after acute anemia resolves   - Plan discussed with attending, Dr. Garcia    [id number] 55M with hx of PUD s/p distal gastrectomy with Billroth II reconstruction, c/b jejunogastric intussusception (2017) s/p EGD, chronic anemia, presents with hematemesis, likely 2/2 upper GI bleed from recurrent peptic ulcer vs marginal ulcer    PLAN:   - Admit to surgery A team, Dr. Ronnie Garcia  - NPO  - transfuse pRBC, platelet  - Protonix BID  - consult GI for EGD  - consult hematology for anemia and thrombocytopenia workup  - given recurrent jejunogastric intussusception, will benefit from surgical repair after acute anemia resolves   - Plan discussed with attending, Dr. Garcia    [phone]

## 2021-04-13 NOTE — CONSULT NOTE ADULT - PROBLEM SELECTOR RECOMMENDATION 4
UBIG with ITP  transufse to keep hgbabove 7  GI and hematology eval appreciated  IV hydration   active T&S UGIB with ITP  Transfuse to keep hgb above 7  GI and hematology eval appreciated  IV hydration   active T&S

## 2021-04-13 NOTE — CONSULT NOTE ADULT - ATTENDING COMMENTS
Patient with nausea, vomiting and hematemesis/CGE.   History of Billroth II for PUD. Also with history of J-G intussusception at level of anastomosis in 2017, managed conservatively.   More recent endoscopies in 2016 and 2017 for similar symptoms with friable mucosa at anastomosis.   Though previously with thrombocytopenia, labs on this admission with more significant pancytopenia - WBC 2.7, Hgb 4.4, Plt 11. CT with recurrent J-G intussusception.   Given severe thrombocytopenia, patient may have spontaneous bleeding and is also at increased risk of bleeding from any underlying lesions (irritation from vomiting/retching, underlying friable mucosa, PUD, etc). Continue supportive care with PPI. Can plan for endoscopic evaluation once medically optimized with Hgb >/= 7, Plt >/= 50.   Ultimately, will likely benefit from surgical intervention given recurrent intussusception. In interim, would keep NPO for now. Can consider NGT if/when platelets improved and if ongoing nausea/vomiting.   Appreciate hematology and surgical input.
Patient seen in consult for anemia and thrombocytopenia in the setting of GI bleed. Both anemia and thrombocytopenia are known conditions in the patient. 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 Agree with fellow suggested management plan on anemia w/u and treating ITP with IVIG only at this time. Monitor CBC, watch for bleeding, supportive care.

## 2021-04-13 NOTE — CONSULT NOTE ADULT - ASSESSMENT
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    # Microcytic anemia  - hx of PUD  - dating back to 2017 when iron studies showed ferritin of 6  - likely 2/2 ongoing blood loss and possible malabsorption   - GI consulted  - please check iron studies, ferritin, b12/folate, LDH, haptoglobin, retic count (should have been drawn prior to transfusion)  - will review peripheral smear  - transfuse for hgb goal > 7    # ITP  - plts of 11 on admission  - bone marrow biopsy in 2016 that was consistent with ITP. It is unclear if he has ever been on any treatment for this.   - transfuse for plt goal >50k given active bleeding  - peripheral smear requested  - check fibrinogen, DIC labs, and labs mentioned above  - typically, treatment for ITP is pulse steroids however this can worsen possible GIB. Recommend giving IVIG 1g/kg/day IV over 6 hrs x 2 days. Please pre-medicate with acetaminophen 650mg PO and benadryl 25mg IV 30 mins prior to infusion   - check CBC diff q12hs    # SERNA  - CT abd/pelvis: Status post Bilroth II with jejunogastric intussusception at the level of the gastrojejunostomy. Fluid distention of the stomach and esophagus, consistent with gastric outlet obstruction  - care by colorectal surgery    Manasa Euceda  Hematology-Oncology PGY-6  [phone]  55 year old male with hx of PUD s/p distal gastrectomy with Billroth II reconstruction c/b jejunogastric intussusception (2017), iron deficiency anemia  and ITP who presents with hematemesis.    # Microcytic anemia  - hx of PUD  - dating back to 2017 when iron studies showed ferritin of 6  - used to receive IV iron infusions in the past but has not followed for years  - likely 2/2 ongoing blood loss and possible malabsorption   - GI consulted  - please check iron studies, ferritin, b12/folate, LDH, haptoglobin, retic count (should have been drawn prior to transfusion)  - will review peripheral smear  - transfuse for hgb goal > 7  - patient likely has very low baseline as he is compensated and asymptomatic with hgb of 4.4.    # ITP  - plts of 11 on admission  - bone marrow biopsy in 2016 that was consistent with ITP. It is unclear if he has ever been on any treatment for this.   - transfuse for plt goal >50k given active bleeding  - peripheral smear requested  - check fibrinogen, DIC labs, and labs mentioned above  - typically, treatment for ITP is pulse steroids however this can worsen possible GIB. Recommend giving IVIG 1g/kg/day IV over 6 hrs x 2 days. Please pre-medicate with acetaminophen 650mg PO and benadryl 25mg IV 30 mins prior to infusion   - check CBC diff q12hs    # SERNA  - CT abd/pelvis: Status post Bilroth II with jejunogastric intussusception at the level of the gastrojejunostomy. Fluid distention of the stomach and esophagus, consistent with gastric outlet obstruction  - care by colorectal surgery    Manasa Euceda  Hematology-Oncology PGY-6  [phone]

## 2021-04-13 NOTE — ED ADULT TRIAGE NOTE - CHIEF COMPLAINT QUOTE
Pt. c/o abdominal pain, nausea and vomiting through out the night.  noted blood in vomit. stated he has hx of stomach ulcers.

## 2021-04-13 NOTE — CONSULT NOTE ADULT - ASSESSMENT
55 M Hx of PUD s/p Bilroth II gastrojejunostomy for perforation (>25 years ago) with multiple hospitalizations for UGIB (2014, 2016, 2017) now presenting for CGE/hematemesis x 1 day.       IMPRESSION:   #Hematemesis, acute blood loss anemia: pt w/ multiple episodes of CGE + hematemesis after multiple episodes of vomiting w/ Hb 4.4 on admission. HD stable. Ddx UGIB 2/2 PUD v MWT v erosive esophagitis v erosive gastropathy vs malignancy v unlikely EV/GV without portal HTN. Pt needs resuscitation prior to EGD considering Hb in 4s, PLT 11s.   #N/V: likely in setting of intussusception seen on CT a/p (4/13/21) at site of Bilroth II. Also with prior admission for  intussusception which resolved (11/2017).  #Thrombocytopenia: PLT 11 on admission. BM biopsy (2016) c/w ITP. Heme/onc consulted.   #PUD s/p Bilroth II gastrojejunostomy for perforation (>25 years ago)      RECOMMENDATIONS:   - PPI gtt  - Trend CBC, transfuse Hb < 7, PLT <50 in setting of bleeding  - Active T&S  - Pt will need severe thrombocytopenia + anemia corrected prior to EGD (at higher risk of trauma/bleeding from endoscopy)   - Please place NPO at MN in case pt needs EGD tomorrow   - Appreciate surgery recs re: intussusception (currently planning for surgical repair due to recurrent episodes of intussusception)   - Appreciate heme/onc consult in management of pt's thrombocytopenia       Thank you for involving us in the care of this patient, please reach out if any further questions.     Ernesto Nava MD  Gastroenterology Fellow, PGY4    Available on Microsoft Teams  [phone] ([facility])  [phone] ([facility])  Please contact on call fellow weekdays after 5pm-7am and weekends: [phone]

## 2021-04-13 NOTE — CONSULT NOTE ADULT - PROBLEM SELECTOR RECOMMENDATION 9
acute blood loss anemia   GI eval appreciated  rescucitaiton with PRBC transfusion  plan for EGD lynn stable, NPO fo rnow, PPI  R/O PUD v MWT v erosive esophagitis v erosive gastropathy vs malignancy v unlikely EV/GV without portal HTN.

## 2021-04-13 NOTE — H&P ADULT - NSHPLABSRESULTS_GEN_ALL_CORE
LABS:                        4.4    5.67  )-----------( 11       ( 13 Apr 2021 09:19 )             16.7     04-13    141  |  103  |  3<L>  ----------------------------<  112<H>  4.0   |  29  |  0.62    Ca    9.0      13 Apr 2021 09:19    TPro  7.4  /  Alb  4.0  /  TBili  0.2  /  DBili  x   /  AST  23  /  ALT  19  /  AlkPhos  86  04-13    PT/INR - ( 13 Apr 2021 09:19 )   PT: 13.3 sec;   INR: 1.18 ratio         PTT - ( 13 Apr 2021 09:19 )  PTT:29.2 sec    < from: CT Abdomen and Pelvis w/ IV Cont (04.13.21 @ 11:06) >    FINDINGS:  LOWER CHEST: Within normal limits.    LIVER: Within normal limits.  BILE DUCTS: Normal caliber.  GALLBLADDER: Within normal limits.  SPLEEN: Within normal limits.  PANCREAS: Within normal limits.  ADRENALS: Within normal limits.  KIDNEYS/URETERS: Within normal limits.    BLADDER: Within normal limits.  REPRODUCTIVE ORGANS: Prostate within normal limits.    BOWEL: Status post distal gastrectomy and gastrojejunostomy with jejunogastric intussusception at the level of the gastrojejunostomy similar to findings present on prior examination 11/30/2017. Prominent distention of the stomach with reflux of fluid into the lower esophagus, consistent with gastric outlet obstruction.  PERITONEUM: No ascites.  VESSELS: Within normal limits.  RETROPERITONEUM/LYMPH NODES: No lymphadenopathy.  ABDOMINAL WALL: Within normal limits.  BONES: Sclerotic lesions in the L5 vertebral body and right iliac bone, likely bone islands.    IMPRESSION:  Status post Bilroth II with jejunogastric intussusception at the level of the gastrojejunostomy.  Fluid distention of the stomach and esophagus, consistent with gastric outlet obstruction.    < end of copied text >

## 2021-04-13 NOTE — CONSULT NOTE ADULT - PROBLEM SELECTOR RECOMMENDATION 2
Surgery as per surg team  plan for OR after medically optimized  NPO  IV hydration  active T&S , transfuse to keep hgb above 7

## 2021-04-13 NOTE — ED PROVIDER NOTE - ATTENDING CONTRIBUTION TO CARE
54 yo male with PMH PUD, s/p Billroth II many years ago presents to ED for evaluation of hematemesis since this morning. Pt reports he awakened around 3 am this morning, drank tea, and then began to feel abd discomfort with nausea, threw up a few episodes just food. Around 4AM, noted blood emesis. Reports that his pain is a little worse now. States similar symptoms years ago, required blood transfusion. Denies fevers, chills, chest pain, shortness of breath, dizziness, lightheadedness. Reports he has not been taking his PPI, has been controlling diet. States he is usually asymptomatic but every few years feels abd pains. States he is not following closely with GI  Gen: no acute distress, tired appearing, grimacing in pain, AAOx3  Cardiac: regular rate and rhythm, +S1S2  Pulm: Clear to auscultation bilaterally  Abd: soft, +midabominal ttp, nondistended, no guarding  A/p GI Bleed - labs, iaging, medication, hydrate, consult GI, poss surg consult, adm

## 2021-04-13 NOTE — ED ADULT NURSE NOTE - OBJECTIVE STATEMENT
a&XO4 ambulatory self care 55yr old male presenting to the ed today for abdominal pain, vomiting and nausea, patient has a hx of gastric ulcers with partial gastrectomy. patient woke up today at 5am vomiting and noticed blood in his vomit. nsr on cardiac monitor, awaiting further orders.

## 2021-04-13 NOTE — ED PROVIDER NOTE - CLINICAL SUMMARY MEDICAL DECISION MAKING FREE TEXT BOX
56 y/o male pmh PUD s/p Billroth II procedure x 20 years ago w/ vomiting and hematemesis x today- will check labs, CT, protonix, GI consult, admit

## 2021-04-14 LAB
ANION GAP SERPL CALC-SCNC: 8 MMOL/L — SIGNIFICANT CHANGE UP (ref 7–14)
APTT BLD: 27.7 SEC — SIGNIFICANT CHANGE UP (ref 27–36.3)
BASOPHILS # BLD AUTO: 0.06 K/UL — SIGNIFICANT CHANGE UP (ref 0–0.2)
BASOPHILS NFR BLD AUTO: 1.1 % — SIGNIFICANT CHANGE UP (ref 0–2)
BUN SERPL-MCNC: 4 MG/DL — LOW (ref 7–23)
CALCIUM SERPL-MCNC: 8.9 MG/DL — SIGNIFICANT CHANGE UP (ref 8.4–10.5)
CHLORIDE SERPL-SCNC: 102 MMOL/L — SIGNIFICANT CHANGE UP (ref 98–107)
CO2 SERPL-SCNC: 28 MMOL/L — SIGNIFICANT CHANGE UP (ref 22–31)
COVID-19 SPIKE DOMAIN AB INTERP: POSITIVE
COVID-19 SPIKE DOMAIN ANTIBODY RESULT: 95.7 U/ML — HIGH
CREAT SERPL-MCNC: 0.73 MG/DL — SIGNIFICANT CHANGE UP (ref 0.5–1.3)
EOSINOPHIL # BLD AUTO: 0.25 K/UL — SIGNIFICANT CHANGE UP (ref 0–0.5)
EOSINOPHIL NFR BLD AUTO: 4.4 % — SIGNIFICANT CHANGE UP (ref 0–6)
FERRITIN SERPL-MCNC: 15 NG/ML — LOW (ref 30–400)
GLUCOSE SERPL-MCNC: 101 MG/DL — HIGH (ref 70–99)
HAPTOGLOB SERPL-MCNC: 55 MG/DL — SIGNIFICANT CHANGE UP (ref 34–200)
HAV IGM SER-ACNC: SIGNIFICANT CHANGE UP
HBV CORE IGM SER-ACNC: SIGNIFICANT CHANGE UP
HBV SURFACE AG SER-ACNC: SIGNIFICANT CHANGE UP
HCT VFR BLD CALC: 22.7 % — LOW (ref 39–50)
HCV AB S/CO SERPL IA: 0.17 S/CO — SIGNIFICANT CHANGE UP (ref 0–0.99)
HCV AB SERPL-IMP: SIGNIFICANT CHANGE UP
HGB BLD-MCNC: 6.8 G/DL — CRITICAL LOW (ref 13–17)
IANC: 4.13 K/UL — SIGNIFICANT CHANGE UP (ref 1.5–8.5)
IMM GRANULOCYTES NFR BLD AUTO: 2.8 % — HIGH (ref 0–1.5)
INR BLD: 1.19 RATIO — HIGH (ref 0.88–1.16)
IRON SATN MFR SERPL: 29 % — SIGNIFICANT CHANGE UP (ref 14–50)
IRON SATN MFR SERPL: 72 UG/DL — SIGNIFICANT CHANGE UP (ref 45–165)
LDH SERPL L TO P-CCNC: 175 U/L — SIGNIFICANT CHANGE UP (ref 135–225)
LYMPHOCYTES # BLD AUTO: 0.68 K/UL — LOW (ref 1–3.3)
LYMPHOCYTES # BLD AUTO: 12.1 % — LOW (ref 13–44)
MAGNESIUM SERPL-MCNC: 1.4 MG/DL — LOW (ref 1.6–2.6)
MCHC RBC-ENTMCNC: 20.8 PG — LOW (ref 27–34)
MCHC RBC-ENTMCNC: 30 GM/DL — LOW (ref 32–36)
MCV RBC AUTO: 69.4 FL — LOW (ref 80–100)
MONOCYTES # BLD AUTO: 0.35 K/UL — SIGNIFICANT CHANGE UP (ref 0–0.9)
MONOCYTES NFR BLD AUTO: 6.2 % — SIGNIFICANT CHANGE UP (ref 2–14)
NEUTROPHILS # BLD AUTO: 4.13 K/UL — SIGNIFICANT CHANGE UP (ref 1.8–7.4)
NEUTROPHILS NFR BLD AUTO: 73.4 % — SIGNIFICANT CHANGE UP (ref 43–77)
NRBC # BLD: 0 /100 WBCS — SIGNIFICANT CHANGE UP
NRBC # FLD: 0.05 K/UL — HIGH
PHOSPHATE SERPL-MCNC: 2.4 MG/DL — LOW (ref 2.5–4.5)
PLATELET # BLD AUTO: 14 K/UL — CRITICAL LOW (ref 150–400)
POTASSIUM SERPL-MCNC: 3.7 MMOL/L — SIGNIFICANT CHANGE UP (ref 3.5–5.3)
POTASSIUM SERPL-SCNC: 3.7 MMOL/L — SIGNIFICANT CHANGE UP (ref 3.5–5.3)
PROTHROM AB SERPL-ACNC: 13.6 SEC — SIGNIFICANT CHANGE UP (ref 10.6–13.6)
RBC # BLD: 3.27 M/UL — LOW (ref 4.2–5.8)
RBC # FLD: SIGNIFICANT CHANGE UP (ref 10.3–14.5)
RETICS #: 11.8 K/UL — LOW (ref 25–125)
RETICS/RBC NFR: 0.4 % — LOW (ref 0.5–2.5)
SARS-COV-2 IGG+IGM SERPL QL IA: 95.7 U/ML — HIGH
SARS-COV-2 IGG+IGM SERPL QL IA: POSITIVE
SODIUM SERPL-SCNC: 138 MMOL/L — SIGNIFICANT CHANGE UP (ref 135–145)
TIBC SERPL-MCNC: 251 UG/DL — SIGNIFICANT CHANGE UP (ref 220–430)
UIBC SERPL-MCNC: 179 UG/DL — SIGNIFICANT CHANGE UP (ref 110–370)
WBC # BLD: 5.63 K/UL — SIGNIFICANT CHANGE UP (ref 3.8–10.5)
WBC # FLD AUTO: 5.63 K/UL — SIGNIFICANT CHANGE UP (ref 3.8–10.5)

## 2021-04-14 PROCEDURE — 99232 SBSQ HOSP IP/OBS MODERATE 35: CPT | Mod: GC

## 2021-04-14 PROCEDURE — 99232 SBSQ HOSP IP/OBS MODERATE 35: CPT

## 2021-04-14 RX ORDER — MAGNESIUM SULFATE 500 MG/ML
2 VIAL (ML) INJECTION ONCE
Refills: 0 | Status: COMPLETED | OUTPATIENT
Start: 2021-04-14 | End: 2021-04-14

## 2021-04-14 RX ORDER — LIDOCAINE 4 G/100G
1 CREAM TOPICAL DAILY
Refills: 0 | Status: DISCONTINUED | OUTPATIENT
Start: 2021-04-14 | End: 2021-04-24

## 2021-04-14 RX ORDER — LANOLIN ALCOHOL/MO/W.PET/CERES
5 CREAM (GRAM) TOPICAL AT BEDTIME
Refills: 0 | Status: DISCONTINUED | OUTPATIENT
Start: 2021-04-14 | End: 2021-04-14

## 2021-04-14 RX ORDER — FOLIC ACID 0.8 MG
1 TABLET ORAL DAILY
Refills: 0 | Status: DISCONTINUED | OUTPATIENT
Start: 2021-04-14 | End: 2021-04-14

## 2021-04-14 RX ORDER — IRON SUCROSE 20 MG/ML
300 INJECTION, SOLUTION INTRAVENOUS EVERY 24 HOURS
Refills: 0 | Status: COMPLETED | OUTPATIENT
Start: 2021-04-14 | End: 2021-04-18

## 2021-04-14 RX ORDER — LANOLIN ALCOHOL/MO/W.PET/CERES
3 CREAM (GRAM) TOPICAL AT BEDTIME
Refills: 0 | Status: DISCONTINUED | OUTPATIENT
Start: 2021-04-14 | End: 2021-04-24

## 2021-04-14 RX ORDER — FOLIC ACID 0.8 MG
1 TABLET ORAL DAILY
Refills: 0 | Status: DISCONTINUED | OUTPATIENT
Start: 2021-04-14 | End: 2021-04-17

## 2021-04-14 RX ORDER — POTASSIUM PHOSPHATE, MONOBASIC POTASSIUM PHOSPHATE, DIBASIC 236; 224 MG/ML; MG/ML
15 INJECTION, SOLUTION INTRAVENOUS ONCE
Refills: 0 | Status: COMPLETED | OUTPATIENT
Start: 2021-04-14 | End: 2021-04-15

## 2021-04-14 RX ORDER — DIPHENHYDRAMINE HCL 50 MG
25 CAPSULE ORAL DAILY
Refills: 0 | Status: DISCONTINUED | OUTPATIENT
Start: 2021-04-14 | End: 2021-04-17

## 2021-04-14 RX ADMIN — Medication 3 MILLIGRAM(S): at 23:48

## 2021-04-14 RX ADMIN — Medication 1 MILLIGRAM(S): at 23:48

## 2021-04-14 RX ADMIN — Medication 650 MILLIGRAM(S): at 04:25

## 2021-04-14 RX ADMIN — SODIUM CHLORIDE 100 MILLILITER(S): 9 INJECTION, SOLUTION INTRAVENOUS at 23:48

## 2021-04-14 RX ADMIN — Medication 25 MILLIGRAM(S): at 04:25

## 2021-04-14 RX ADMIN — IRON SUCROSE 176.67 MILLIGRAM(S): 20 INJECTION, SOLUTION INTRAVENOUS at 22:27

## 2021-04-14 RX ADMIN — SODIUM CHLORIDE 100 MILLILITER(S): 9 INJECTION, SOLUTION INTRAVENOUS at 08:59

## 2021-04-14 RX ADMIN — IMMUNE GLOBULIN (HUMAN) 125 GRAM(S): 10 INJECTION INTRAVENOUS; SUBCUTANEOUS at 05:04

## 2021-04-14 RX ADMIN — Medication 650 MILLIGRAM(S): at 04:55

## 2021-04-14 RX ADMIN — PANTOPRAZOLE SODIUM 10 MG/HR: 20 TABLET, DELAYED RELEASE ORAL at 08:59

## 2021-04-14 RX ADMIN — LIDOCAINE 1 PATCH: 4 CREAM TOPICAL at 18:27

## 2021-04-14 RX ADMIN — SODIUM CHLORIDE 100 MILLILITER(S): 9 INJECTION, SOLUTION INTRAVENOUS at 14:36

## 2021-04-14 RX ADMIN — LIDOCAINE 1 PATCH: 4 CREAM TOPICAL at 16:01

## 2021-04-14 RX ADMIN — Medication 50 GRAM(S): at 23:48

## 2021-04-14 NOTE — PROGRESS NOTE ADULT - SUBJECTIVE AND OBJECTIVE BOX
INTERVAL HPI/OVERNIGHT EVENTS:  Patient is s/p 4 units pRBC and 2 units of plts with not so great response     VITAL SIGNS:  T(F): 98.2 (04-14-21 @ 09:32)  HR: 67 (04-14-21 @ 09:32)  BP: 103/72 (04-14-21 @ 09:32)  RR: 18 (04-14-21 @ 09:32)  SpO2: 100% (04-14-21 @ 09:32)  Wt(kg): --    PHYSICAL EXAM:    Constitutional: NAD  Eyes: EOMI, sclera non-icteric  Neck: supple, no masses  Respiratory: CTA  Cardiovascular: RRR  Gastrointestinal: soft, NTND  Extremities: no c/c/e  Neurological: AAOx3      MEDICATIONS  (STANDING):  diphenhydrAMINE   Injectable 25 milliGRAM(s) IV Push daily  immune   globulin 10% (GAMMAGARD) IVPB 75 Gram(s) IV Intermittent daily  lactated ringers. 1000 milliLiter(s) (100 mL/Hr) IV Continuous <Continuous>  magnesium sulfate  IVPB 2 Gram(s) IV Intermittent once  pantoprazole Infusion 8 mG/Hr (10 mL/Hr) IV Continuous <Continuous>  potassium phosphate IVPB 15 milliMole(s) IV Intermittent once    MEDICATIONS  (PRN):  lidocaine   Patch 1 Patch Transdermal daily PRN Back pain      Allergies    latex (Rash)  No Known Drug Allergies  shellfish ([place])    Intolerances        LABS:                        6.8    5.63  )-----------( 14       ( 14 Apr 2021 07:18 )             22.7     04-14    138  |  102  |  4<L>  ----------------------------<  101<H>  3.7   |  28  |  0.73    Ca    8.9      14 Apr 2021 07:18  Phos  2.4     04-14  Mg     1.4     04-14    TPro  5.8<L>  /  Alb  3.4  /  TBili  0.7  /  DBili  x   /  AST  19  /  ALT  13  /  AlkPhos  72  04-13    PT/INR - ( 14 Apr 2021 07:18 )   PT: 13.6 sec;   INR: 1.19 ratio         PTT - ( 14 Apr 2021 07:18 )  PTT:27.7 sec      RADIOLOGY & ADDITIONAL TESTS:  Studies reviewed.    ASSESSMENT & PLAN:  INTERVAL HPI/OVERNIGHT EVENTS:  Patient is s/p 4 units pRBC and 2 units of plts with hgb now 6.8    VITAL SIGNS:  T(F): 98.2 (04-14-21 @ 09:32)  HR: 67 (04-14-21 @ 09:32)  BP: 103/72 (04-14-21 @ 09:32)  RR: 18 (04-14-21 @ 09:32)  SpO2: 100% (04-14-21 @ 09:32)  Wt(kg): --    PHYSICAL EXAM:    Constitutional: NAD  Eyes: EOMI, sclera non-icteric  Neck: supple, no masses  Respiratory: CTA  Cardiovascular: RRR  Gastrointestinal: soft, NTND  Extremities: no c/c/e  Neurological: AAOx3      MEDICATIONS  (STANDING):  diphenhydrAMINE   Injectable 25 milliGRAM(s) IV Push daily  immune   globulin 10% (GAMMAGARD) IVPB 75 Gram(s) IV Intermittent daily  lactated ringers. 1000 milliLiter(s) (100 mL/Hr) IV Continuous <Continuous>  magnesium sulfate  IVPB 2 Gram(s) IV Intermittent once  pantoprazole Infusion 8 mG/Hr (10 mL/Hr) IV Continuous <Continuous>  potassium phosphate IVPB 15 milliMole(s) IV Intermittent once    MEDICATIONS  (PRN):  lidocaine   Patch 1 Patch Transdermal daily PRN Back pain      Allergies    latex (Rash)  No Known Drug Allergies  shellfish ([place])    Intolerances        LABS:                        6.8    5.63  )-----------( 14       ( 14 Apr 2021 07:18 )             22.7     04-14    138  |  102  |  4<L>  ----------------------------<  101<H>  3.7   |  28  |  0.73    Ca    8.9      14 Apr 2021 07:18  Phos  2.4     04-14  Mg     1.4     04-14    TPro  5.8<L>  /  Alb  3.4  /  TBili  0.7  /  DBili  x   /  AST  19  /  ALT  13  /  AlkPhos  72  04-13    PT/INR - ( 14 Apr 2021 07:18 )   PT: 13.6 sec;   INR: 1.19 ratio         PTT - ( 14 Apr 2021 07:18 )  PTT:27.7 sec      RADIOLOGY & ADDITIONAL TESTS:  Studies reviewed.    ASSESSMENT & PLAN:

## 2021-04-14 NOTE — PROGRESS NOTE ADULT - ASSESSMENT
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    # Microcytic anemia  - patient likely has very low baseline as he is compensated and asymptomatic with hgb of 4.4.  - dating back to 2017 when iron studies showed ferritin of 6  - likely 2/2 ongoing blood loss and possible malabsorption   - GI following, state: "Pt needs resuscitation prior to EGD considering Hb in 4s, PLT 11s, however may be difficult without treatment of ITP / if pt has antibodies to pRBCS. "  - iron studies showed ferritin of 15, normal TIBC and iron sats. However, these were drawn after transfused blood was received  - he is now s/p 4 units of pRBC with not an appropriate response  - still awaiting LDH, retic count and haptoglobin to eval for possible hemolysis  - peripheral smear reviewed:   - transfuse for hgb goal > 7      # ITP  - plts of 11 on admission  - bone marrow biopsy in 2016 that was consistent with ITP. It is unclear if he has ever been on any treatment for this.   - transfuse for plt goal >50k given active bleeding  - peripheral smear reviewed  - fibrinogen slightly low, pending d-dimer to eval potential component of DIC  - typically, treatment for ITP is pulse steroids however this can worsen possible GIB. Recommend giving IVIG 1g/kg/day IV over 6 hrs x 2 days.   - s/p 2 units of plts - please keep in mind that patients with ITP do not usually respond to platelet transfusion   - check CBC diff q12hs    # SERNA  - CT abd/pelvis: Status post Bilroth II with jejunogastric intussusception at the level of the gastrojejunostomy. 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    # Microcytic anemia  - patient likely has very low baseline as he is compensated and asymptomatic with hgb of 4.4.  - dating back to 2017 when iron studies showed ferritin of 6  - likely 2/2 ongoing blood loss and possible malabsorption   - GI following, state: "Pt needs resuscitation prior to EGD considering Hb in 4s, PLT 11s, however may be difficult without treatment of ITP / if pt has antibodies to pRBCS. "  - iron studies showed ferritin of 15, normal TIBC and iron sats. However, these were drawn after transfused blood was received and he is still iron deficient. Recommend starting IV iron x 3-5 days  - peripheral smear reviewed:  target cells, reticulocytes, pencil cells, no schistocytes. 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 It is unclear if he has ever been on any treatment for this.   - transfuse for plt goal >50k given active bleeding  - peripheral smear reviewed  - fibrinogen slightly low, pending d-dimer to eval potential component of DIC  - typically, treatment for ITP is pulse steroids however this can worsen possible GIB. Recommend giving IVIG 1g/kg/day IV over 6 hrs x 2 days.   - s/p 2 units of plts - please keep in mind that patients with ITP do not usually respond to platelet transfusion   - check CBC diff q12hs    # SERNA  - CT abd/pelvis: Status post Bilroth II with jejunogastric intussusception at the level of the gastrojejunostomy. Fluid distention of the stomach and esophagus, consistent with gastric outlet obstruction  - care by colorectal surgery    Manasa Euceda  Hematology-Oncology PGY-6  [phone]

## 2021-04-14 NOTE — PROGRESS NOTE ADULT - SUBJECTIVE AND OBJECTIVE BOX
SURGERY PROGRESS NOTE    SUBJECTIVE / 24H EVENTS:  Patient seen and examined on morning rounds. Repeat CBC with inappropriate response to 2u pRBC + 1u Plt earlier in the day yesterday, received an additional 2u pRBC + 1 Plt overnight. IVIG initiated per Heme recs.     OBJECTIVE:  VITAL SIGNS:  T(C): 37.2 (04-14-21 @ 01:50), Max: 37.4 (04-13-21 @ 21:25)  HR: 80 (04-14-21 @ 01:50) (72 - 89)  BP: 118/62 (04-14-21 @ 01:50) (115/64 - 155/60)  RR: 16 (04-14-21 @ 01:50) (15 - 20)  SpO2: 100% (04-14-21 @ 01:50) (99% - 100%)  Daily Height in cm: 190.5 (13 Apr 2021 17:10)    Daily       Exam:  Gen: NAD, resting in bed, alert and responding appropriately  Resp: Airway patent, non-labored respirations  Abd: midline incisional scar above umbilicus noted. Soft, ND, NT, no rebound or guarding  Ext: No edema, WWP  Neuro: AAOx3, no focal deficits      04-13-21 @ 07:01  -  04-14-21 @ 02:53  --------------------------------------------------------  IN:    Lactated Ringers: 900 mL    Pantoprazole: 60 mL    Platelets - Single Donor: 195 mL    PRBCs (Packed Red Blood Cells): 600 mL  Total IN: 1755 mL    OUT:    Estimated Blood Loss (mL): 0 mL    IV PiggyBack: 0 mL    Oral Fluid: 0 mL    Voided (mL): 1800 mL  Total OUT: 1800 mL    Total NET: -45 mL          LAB VALUES:  04-13    142  |  106  |  6<L>  ----------------------------<  100<H>  3.8   |  30  |  0.66    Ca    8.6      13 Apr 2021 20:09    TPro  5.8<L>  /  Alb  3.4  /  TBili  0.7  /  DBili  x   /  AST  19  /  ALT  13  /  AlkPhos  72  04-13                               5.0    4.80  )-----------( 15       ( 13 Apr 2021 20:09 )             17.4     LIVER FUNCTIONS - ( 13 Apr 2021 20:09 )  Alb: 3.4 g/dL / Pro: 5.8 g/dL / ALK PHOS: 72 U/L / ALT: 13 U/L / AST: 19 U/L / GGT: x           PT/INR - ( 13 Apr 2021 09:19 )   PT: 13.3 sec;   INR: 1.18 ratio         PTT - ( 13 Apr 2021 09:19 )  PTT:29.2 sec            MICROBIOLOGY:      RADIOLOGY:  PACS Image: Image(s) Available (04-13-21 @ 11:06)        MEDICATIONS  (STANDING):  acetaminophen   Tablet .. 650 milliGRAM(s) Oral once  diphenhydrAMINE   Injectable 25 milliGRAM(s) IV Push daily  immune   globulin 10% (GAMMAGARD) IVPB 75 Gram(s) IV Intermittent daily  lactated ringers. 1000 milliLiter(s) (100 mL/Hr) IV Continuous <Continuous>  pantoprazole Infusion 8 mG/Hr (10 mL/Hr) IV Continuous <Continuous>    MEDICATIONS  (PRN):        SURGERY PROGRESS NOTE    SUBJECTIVE / 24H EVENTS:  Patient seen and examined on morning rounds. Repeat CBC with inappropriate response to 2u pRBC + 1u Plt earlier in the day yesterday, received an additional 2u pRBC + 1 Plt overnight. IVIG initiated per Heme recs. Denies abdominal pain, nausea, vomiting, -/- bowel function.    OBJECTIVE:  VITAL SIGNS:  T(C): 37.2 (04-14-21 @ 01:50), Max: 37.4 (04-13-21 @ 21:25)  HR: 80 (04-14-21 @ 01:50) (72 - 89)  BP: 118/62 (04-14-21 @ 01:50) (115/64 - 155/60)  RR: 16 (04-14-21 @ 01:50) (15 - 20)  SpO2: 100% (04-14-21 @ 01:50) (99% - 100%)  Daily Height in cm: 190.5 (13 Apr 2021 17:10)    Daily       Exam:  Gen: NAD, resting in bed, alert and responding appropriately  Resp: Airway patent, non-labored respirations  Abd: midline incisional scar above umbilicus noted. Soft, ND, NT, no rebound or guarding  Ext: No edema, WWP  Neuro: AAOx3, no focal deficits      04-13-21 @ 07:01  -  04-14-21 @ 02:53  --------------------------------------------------------  IN:    Lactated Ringers: 900 mL    Pantoprazole: 60 mL    Platelets - Single Donor: 195 mL    PRBCs (Packed Red Blood Cells): 600 mL  Total IN: 1755 mL    OUT:    Estimated Blood Loss (mL): 0 mL    IV PiggyBack: 0 mL    Oral Fluid: 0 mL    Voided (mL): 1800 mL  Total OUT: 1800 mL    Total NET: -45 mL          LAB VALUES:  04-13    142  |  106  |  6<L>  ----------------------------<  100<H>  3.8   |  30  |  0.66    Ca    8.6      13 Apr 2021 20:09    TPro  5.8<L>  /  Alb  3.4  /  TBili  0.7  /  DBili  x   /  AST  19  /  ALT  13  /  AlkPhos  72  04-13                               5.0    4.80  )-----------( 15       ( 13 Apr 2021 20:09 )             17.4     LIVER FUNCTIONS - ( 13 Apr 2021 20:09 )  Alb: 3.4 g/dL / Pro: 5.8 g/dL / ALK PHOS: 72 U/L / ALT: 13 U/L / AST: 19 U/L / GGT: x           PT/INR - ( 13 Apr 2021 09:19 )   PT: 13.3 sec;   INR: 1.18 ratio         PTT - ( 13 Apr 2021 09:19 )  PTT:29.2 sec            MICROBIOLOGY:      RADIOLOGY:  PACS Image: Image(s) Available (04-13-21 @ 11:06)        MEDICATIONS  (STANDING):  acetaminophen   Tablet .. 650 milliGRAM(s) Oral once  diphenhydrAMINE   Injectable 25 milliGRAM(s) IV Push daily  immune   globulin 10% (GAMMAGARD) IVPB 75 Gram(s) IV Intermittent daily  lactated ringers. 1000 milliLiter(s) (100 mL/Hr) IV Continuous <Continuous>  pantoprazole Infusion 8 mG/Hr (10 mL/Hr) IV Continuous <Continuous>    MEDICATIONS  (PRN):

## 2021-04-14 NOTE — PROGRESS NOTE ADULT - SUBJECTIVE AND OBJECTIVE BOX
Name of Patient : BLANCA PUCKETT  MRN: [id number]  DATE OF SERVICE: 04-14-21     Subjective: Patient seen and examined. No new events except as noted.   no bleeding  plt cont ot be low despite transfusion in view of ITP   PRBC transfusion       REVIEW OF SYSTEMS:    CONSTITUTIONAL: No weakness, fevers or chills  EYES/ENT: No visual changes;  No vertigo or throat pain   NECK: No pain or stiffness  RESPIRATORY: No cough, wheezing, hemoptysis; No shortness of breath  CARDIOVASCULAR: No chest pain or palpitations  GASTROINTESTINAL: No abdominal or epigastric pain. No nausea, vomiting, or hematemesis; No diarrhea or constipation. No melena or hematochezia.  GENITOURINARY: No dysuria, frequency or hematuria  NEUROLOGICAL: No numbness or weakness  SKIN: No itching, burning, rashes, or lesions   All other review of systems is negative unless indicated above.    MEDICATIONS:  MEDICATIONS  (STANDING):  diphenhydrAMINE   Injectable 25 milliGRAM(s) IV Push daily  folic acid Injectable 1 milliGRAM(s) IV Push daily  immune   globulin 10% (GAMMAGARD) IVPB 75 Gram(s) IV Intermittent daily  iron sucrose IVPB 300 milliGRAM(s) IV Intermittent every 24 hours  lactated ringers. 1000 milliLiter(s) (100 mL/Hr) IV Continuous <Continuous>  magnesium sulfate  IVPB 2 Gram(s) IV Intermittent once  pantoprazole Infusion 8 mG/Hr (10 mL/Hr) IV Continuous <Continuous>  potassium phosphate IVPB 15 milliMole(s) IV Intermittent once      PHYSICAL EXAM:  T(C): 37.3 (04-14-21 @ 17:31), Max: 37.9 (04-13-21 @ 23:45)  HR: 72 (04-14-21 @ 17:31) (62 - 91)  BP: 139/72 (04-14-21 @ 17:31) (103/72 - 143/77)  RR: 17 (04-14-21 @ 17:31) (15 - 18)  SpO2: 100% (04-14-21 @ 17:31) (98% - 100%)  Wt(kg): --  I&O's Summary    13 Apr 2021 07:01  -  14 Apr 2021 07:00  --------------------------------------------------------  IN: 1755 mL / OUT: 1800 mL / NET: -45 mL    14 Apr 2021 07:01  -  14 Apr 2021 21:35  --------------------------------------------------------  IN: 340 mL / OUT: 450 mL / NET: -110 mL          Appearance: Normal	  HEENT:  PERRLA   Lymphatic: No lymphadenopathy   Cardiovascular: Normal S1 S2, no JVD  Respiratory: normal effort , clear  Gastrointestinal:  Soft, Non-tender  Skin: No rashes,  warm to touch  Psychiatry:  Mood & affect appropriate  Musculuskeletal: No edema      All labs, Imaging and EKGs personally reviewed       04-13-21 @ 07:01  -  04-14-21 @ 07:00  --------------------------------------------------------  IN: 1755 mL / OUT: 1800 mL / NET: -45 mL    04-14-21 @ 07:01  -  04-14-21 @ 21:35  --------------------------------------------------------  IN: 340 mL / OUT: 450 mL / NET: -110 mL                            6.8    5.63  )-----------( 14       ( 14 Apr 2021 07:18 )             22.7               04-14    138  |  102  |  4<L>  ----------------------------<  101<H>  3.7   |  28  |  0.73    Ca    8.9      14 Apr 2021 07:18  Phos  2.4     04-14  Mg     1.4     04-14    TPro  5.8<L>  /  Alb  3.4  /  TBili  0.7  /  DBili  x   /  AST  19  /  ALT  13  /  AlkPhos  72  04-13    PT/INR - ( 14 Apr 2021 07:18 )   PT: 13.6 sec;   INR: 1.19 ratio         PTT - ( 14 Apr 2021 07:18 )  PTT:27.7 sec

## 2021-04-14 NOTE — PROGRESS NOTE ADULT - SUBJECTIVE AND OBJECTIVE BOX
Chief Complaint:  Patient is a 55y old  Male who presents with a chief complaint of GI bleed (14 Apr 2021 02:52)      Interval Events:   - Hb 4.4 --> 3U pRBCs --> Hb 5.0 --> 1U pRBC  - No further episodes of hematemesis today (last in AM in ED)   - Denies BMs today  - Endorses mild abd pain diffusely   -         Allergies:  latex (Rash)  No Known Drug Allergies  shellfish ([place])        Hospital Medications:  diphenhydrAMINE   Injectable 25 milliGRAM(s) IV Push daily  immune   globulin 10% (GAMMAGARD) IVPB 75 Gram(s) IV Intermittent daily  lactated ringers. 1000 milliLiter(s) IV Continuous <Continuous>  lidocaine   Patch 1 Patch Transdermal daily PRN  pantoprazole Infusion 8 mG/Hr IV Continuous <Continuous>      PMHX/PSHX:  Gastric ulcer    Chronic anemia    S/P partial gastrectomy    History of Billroth II operation        Family history:  No pertinent family history in first degree relatives        ROS:     General:  No wt loss, fevers, chills, night sweats, fatigue,   Eyes:  Good vision, no reported pain  ENT:  No sore throat, pain, runny nose, dysphagia  CV:  No pain, palpitations, hypo/hypertension  Pulm:  No dyspnea, cough, tachypnea, wheezing  GI:  No pain, No nausea, No vomiting, No diarrhea, No constipation, No weight loss, No fever, No pruritis, No rectal bleeding, No tarry stools, No dysphagia  :  No pain, bleeding, incontinence, nocturia  Muscle:  No pain, weakness  Neuro:  No weakness, tingling, memory problems  Psych:  No fatigue, insomnia, mood problems, depression  Endocrine:  No polyuria, polydipsia, cold/heat intolerance  Heme:  No petechiae, ecchymosis, easy bruisability  Skin:  No rash, tattoos, scars, edema      PHYSICAL EXAM:   Vital Signs:  Vital Signs Last 24 Hrs  T(C): 36.3 (14 Apr 2021 08:29), Max: 37.9 (13 Apr 2021 23:45)  T(F): 97.4 (14 Apr 2021 08:29), Max: 100.2 (13 Apr 2021 23:45)  HR: 77 (14 Apr 2021 08:29) (62 - 91)  BP: 126/76 (14 Apr 2021 08:29) (115/66 - 143/77)  BP(mean): --  RR: 18 (14 Apr 2021 08:29) (15 - 18)  SpO2: 99% (14 Apr 2021 08:29) (98% - 100%)  Daily Height in cm: 190.5 (13 Apr 2021 17:10)    Daily     GENERAL:  No acute distress  HEENT:  Normocephalic/atraumtic,  no scleral icterus  CHEST:  Clear to auscultation bilaterally, no wheezes/rales/ronchi, no accessory muscle use  HEART:  Regular rate and rhythm, no murmurs/rubs/gallops  ABDOMEN:  Soft, non-tender, non-distended, normoactive bowel sounds, no masses, no hepato-splenomegaly, no signs of chronic liver disease  EXTREMITIES:  No cyanosis, clubbing, or edema  SKIN:  No rash/erythema/ecchymoses/petechiae/wounds/abscess/warm/dry  NEURO:  Alert and oriented x 3, no asterixis, no tremor    LABS:                        5.0    4.80  )-----------( 15       ( 13 Apr 2021 20:09 )             17.4     Mean Cell Volume: 65.7 fL (04-13-21 @ 20:09)    04-14    138  |  102  |  4<L>  ----------------------------<  101<H>  3.7   |  28  |  0.73    Ca    8.9      14 Apr 2021 07:18  Phos  2.4     04-14  Mg     1.4     04-14    TPro  5.8<L>  /  Alb  3.4  /  TBili  0.7  /  DBili  x   /  AST  19  /  ALT  13  /  AlkPhos  72  04-13    LIVER FUNCTIONS - ( 13 Apr 2021 20:09 )  Alb: 3.4 g/dL / Pro: 5.8 g/dL / ALK PHOS: 72 U/L / ALT: 13 U/L / AST: 19 U/L / GGT: x           PT/INR - ( 14 Apr 2021 07:18 )   PT: 13.6 sec;   INR: 1.19 ratio         PTT - ( 14 Apr 2021 07:18 )  PTT:27.7 sec                            5.0    4.80  )-----------( 15       ( 13 Apr 2021 20:09 )             17.4                         4.4    5.67  )-----------( 11 ( 13 Apr 2021 09:19 )             16.7       Imaging:

## 2021-04-14 NOTE — PROGRESS NOTE ADULT - ASSESSMENT
patient is a 55 year old male with PMhx of PUD s/p distal gastrectomy with Billroth II reconstruction c/b jejunogastric intussusception (2017), iron deficiency anemia  and ITP who presents with hematemesis and     Problem/Recommendation - 1:  Problem: Hematemesis. Recommendation: acute blood loss anemia   GI eval appreciated  rescucitaiton with PRBC transfusion  plan for EGD lynn stable, NPO fo rnow, PPI  R/O PUD v MWT v erosive esophagitis v erosive gastropathy vs malignancy v unlikely EV/GV without portal HTN.    Problem/Recommendation - 2:  ·  Problem: Intussusception.  Recommendation: Surgery as per surg team  plan for OR after medically optimized  NPO cont   IV hydration  active T&S , transfuse to keep hgb above 7.     Problem/Recommendation - 3:  ·  Problem: Thrombocytopenia.  Recommendation: ITP   with acute GI bleeding   transfuse to keep plt above 50 however not going to repond in view of ITP. plan of care per hematology   high risk for prednisone, hematology eval appreciated, plan for IVIG, premed per recs   monitor plt level , CBC Q12hrs.     Problem/Recommendation - 4:  ·  Problem: Anemia.  Recommendation: UGIB with ITP  Transfuse to keep hgb above 7  GI and hematology eval appreciated  IV hydration   active T&S.    Problem/Recommendation - 5:  ·  Problem: Prophylactic measure.  Recommendation: DVT and gI PPX       Differential diagnosis and plan of care discussed with patient after the evaluation.   Advanced care planning/advanced directives discussed with patient/family. DNR status including forceful chest compressions to attempt to restart the heart, ventilator support/artificial breathing, electric shock, artificial nutrition   OMT on six regions for acute somatic dysfunctions done at the bedside   Importance of Fall prevention discussed.

## 2021-04-14 NOTE — PROGRESS NOTE ADULT - ASSESSMENT
55 M Hx of PUD s/p Bilroth II gastrojejunostomy for perforation (>25 years ago) with multiple hospitalizations for UGIB (2014, 2016, 2017) now presenting for CGE/hematemesis x 1 day.       IMPRESSION:   #Hematemesis: pt w/ multiple episodes of CGE + hematemesis after multiple episodes of vomiting w/ Hb 4.4 on admission. HD stable. Ddx UGIB 2/2 PUD v MWT v erosive esophagitis v erosive gastropathy vs malignancy v unlikely EV/GV without portal HTN. Pt needs resuscitation prior to EGD considering Hb in 4s, PLT 11s, however may be difficult without treatment of ITP / if pt has antibodies to pRBCS.   #Microcytic anemia: Hb in 4-5s, anemia studies c/w VEL in the past, with likely also component of acute blood loss anemia.   #N/V: likely in setting of intussusception seen on CT a/p (4/13/21) at site of Bilroth II. Also with prior admission for  intussusception which resolved (11/2017).  #Thrombocytopenia likely 2/2 ITP: PLT 11 on admission. BM biopsy (2016) c/w ITP. Started on IVIG (as opposed to steroids since they can worsen GI bleeding)   #PUD s/p Bilroth II gastrojejunostomy for perforation (>25 years ago)      RECOMMENDATIONS:   - F/u AM labs  - PPI gtt  - Trend CBC, transfuse Hb < 7, PLT <50 in setting of bleeding  - Active T&S  - Pt will need severe thrombocytopenia + anemia corrected prior to EGD (at higher risk of trauma/bleeding from endoscopy)   - Appreciate surgery recs re: intussusception (currently planning for surgical repair due to recurrent episodes of intussusception)   - Appreciate heme/onc consult in management of pt's thrombocytopenia       Thank you for involving us in the care of this patient, please reach out if any further questions.     Ernesto Nava MD  Gastroenterology Fellow, PGY4    Available on [employer] Teams  [phone] ([facility])  [phone] ([facility])  Please contact on call fellow weekdays after 5pm-7am and weekends: [phone]

## 2021-04-14 NOTE — PROGRESS NOTE ADULT - ASSESSMENT
55M with hx of PUD s/p distal gastrectomy with Billroth II reconstruction, c/b jejunogastric intussusception (2017) s/p EGD, chronic anemia, presents with hematemesis, likely 2/2 upper GI bleed from recurrent peptic ulcer vs marginal ulcer    PLAN:   - NPO  - transfuse pRBC, platelet PRN  - Protonix BID  - EGD today wit GI  - appreciate Heme recs, IVIG initiated  - given recurrent jejunogastric intussusception, will benefit from surgical repair after acute anemia resolves     A Team Surgery  [id number]   55M with hx of PUD s/p distal gastrectomy with Billroth II reconstruction, c/b jejunogastric intussusception (2017) s/p EGD, chronic anemia, presents with hematemesis, likely 2/2 upper GI bleed from recurrent peptic ulcer vs marginal ulcer    PLAN:   - NPO  - IVF  - transfuse pRBC, platelet PRN  - appreciate Heme recs, IVIG initiated  - serial CBC's   - Protonix BID  - possible EGD today with GI  - given recurrent jejunogastric intussusception, will benefit from surgical repair after acute anemia resolves     A Team Surgery  [id number]

## 2021-04-15 LAB
ANION GAP SERPL CALC-SCNC: 10 MMOL/L — SIGNIFICANT CHANGE UP (ref 7–14)
BUN SERPL-MCNC: 6 MG/DL — LOW (ref 7–23)
CALCIUM SERPL-MCNC: 8.6 MG/DL — SIGNIFICANT CHANGE UP (ref 8.4–10.5)
CHLORIDE SERPL-SCNC: 102 MMOL/L — SIGNIFICANT CHANGE UP (ref 98–107)
CO2 SERPL-SCNC: 25 MMOL/L — SIGNIFICANT CHANGE UP (ref 22–31)
CREAT SERPL-MCNC: 0.7 MG/DL — SIGNIFICANT CHANGE UP (ref 0.5–1.3)
FOLATE SERPL-MCNC: 20 NG/ML — HIGH (ref 3.1–17.5)
GLUCOSE SERPL-MCNC: 106 MG/DL — HIGH (ref 70–99)
HCT VFR BLD CALC: 24.4 % — LOW (ref 39–50)
HCT VFR BLD CALC: 24.5 % — LOW (ref 39–50)
HGB BLD-MCNC: 7.3 G/DL — LOW (ref 13–17)
HGB BLD-MCNC: 7.4 G/DL — LOW (ref 13–17)
MAGNESIUM SERPL-MCNC: 1.6 MG/DL — SIGNIFICANT CHANGE UP (ref 1.6–2.6)
MCHC RBC-ENTMCNC: 21.8 PG — LOW (ref 27–34)
MCHC RBC-ENTMCNC: 22.1 PG — LOW (ref 27–34)
MCHC RBC-ENTMCNC: 29.8 GM/DL — LOW (ref 32–36)
MCHC RBC-ENTMCNC: 30.3 GM/DL — LOW (ref 32–36)
MCV RBC AUTO: 72.8 FL — LOW (ref 80–100)
MCV RBC AUTO: 73.1 FL — LOW (ref 80–100)
NRBC # BLD: 4 /100 WBCS — SIGNIFICANT CHANGE UP
NRBC # BLD: 7 /100 WBCS — SIGNIFICANT CHANGE UP
NRBC # FLD: 0.24 K/UL — HIGH
NRBC # FLD: 0.51 K/UL — HIGH
PHOSPHATE SERPL-MCNC: 3.5 MG/DL — SIGNIFICANT CHANGE UP (ref 2.5–4.5)
PLATELET # BLD AUTO: 20 K/UL — CRITICAL LOW (ref 150–400)
PLATELET # BLD AUTO: 27 K/UL — LOW (ref 150–400)
POTASSIUM SERPL-MCNC: 3.6 MMOL/L — SIGNIFICANT CHANGE UP (ref 3.5–5.3)
POTASSIUM SERPL-SCNC: 3.6 MMOL/L — SIGNIFICANT CHANGE UP (ref 3.5–5.3)
RBC # BLD: 3.35 M/UL — LOW (ref 4.2–5.8)
RBC # BLD: 3.35 M/UL — LOW (ref 4.2–5.8)
RBC # FLD: SIGNIFICANT CHANGE UP (ref 10.3–14.5)
RBC # FLD: SIGNIFICANT CHANGE UP (ref 10.3–14.5)
SODIUM SERPL-SCNC: 137 MMOL/L — SIGNIFICANT CHANGE UP (ref 135–145)
VIT B12 SERPL-MCNC: 313 PG/ML — SIGNIFICANT CHANGE UP (ref 200–900)
WBC # BLD: 6.88 K/UL — SIGNIFICANT CHANGE UP (ref 3.8–10.5)
WBC # BLD: 7.5 K/UL — SIGNIFICANT CHANGE UP (ref 3.8–10.5)
WBC # FLD AUTO: 6.88 K/UL — SIGNIFICANT CHANGE UP (ref 3.8–10.5)
WBC # FLD AUTO: 7.5 K/UL — SIGNIFICANT CHANGE UP (ref 3.8–10.5)

## 2021-04-15 PROCEDURE — 99232 SBSQ HOSP IP/OBS MODERATE 35: CPT | Mod: GC

## 2021-04-15 RX ORDER — HYDROMORPHONE HYDROCHLORIDE 2 MG/ML
0.5 INJECTION INTRAMUSCULAR; INTRAVENOUS; SUBCUTANEOUS ONCE
Refills: 0 | Status: DISCONTINUED | OUTPATIENT
Start: 2021-04-15 | End: 2021-04-15

## 2021-04-15 RX ORDER — ACETAMINOPHEN 500 MG
1000 TABLET ORAL ONCE
Refills: 0 | Status: COMPLETED | OUTPATIENT
Start: 2021-04-15 | End: 2021-04-15

## 2021-04-15 RX ORDER — POTASSIUM CHLORIDE 20 MEQ
10 PACKET (EA) ORAL
Refills: 0 | Status: COMPLETED | OUTPATIENT
Start: 2021-04-15 | End: 2021-04-15

## 2021-04-15 RX ORDER — MAGNESIUM SULFATE 500 MG/ML
2 VIAL (ML) INJECTION ONCE
Refills: 0 | Status: COMPLETED | OUTPATIENT
Start: 2021-04-15 | End: 2021-04-15

## 2021-04-15 RX ADMIN — IMMUNE GLOBULIN (HUMAN) 125 GRAM(S): 10 INJECTION INTRAVENOUS; SUBCUTANEOUS at 14:57

## 2021-04-15 RX ADMIN — Medication 100 MILLIEQUIVALENT(S): at 11:05

## 2021-04-15 RX ADMIN — POTASSIUM PHOSPHATE, MONOBASIC POTASSIUM PHOSPHATE, DIBASIC 62.5 MILLIMOLE(S): 236; 224 INJECTION, SOLUTION INTRAVENOUS at 01:32

## 2021-04-15 RX ADMIN — Medication 100 MILLIEQUIVALENT(S): at 13:44

## 2021-04-15 RX ADMIN — HYDROMORPHONE HYDROCHLORIDE 0.5 MILLIGRAM(S): 2 INJECTION INTRAMUSCULAR; INTRAVENOUS; SUBCUTANEOUS at 09:30

## 2021-04-15 RX ADMIN — Medication 25 MILLIGRAM(S): at 13:44

## 2021-04-15 RX ADMIN — Medication 1000 MILLIGRAM(S): at 23:55

## 2021-04-15 RX ADMIN — Medication 1 MILLIGRAM(S): at 11:07

## 2021-04-15 RX ADMIN — Medication 50 GRAM(S): at 11:05

## 2021-04-15 RX ADMIN — Medication 400 MILLIGRAM(S): at 13:44

## 2021-04-15 RX ADMIN — PANTOPRAZOLE SODIUM 10 MG/HR: 20 TABLET, DELAYED RELEASE ORAL at 11:05

## 2021-04-15 RX ADMIN — Medication 400 MILLIGRAM(S): at 05:17

## 2021-04-15 RX ADMIN — LIDOCAINE 1 PATCH: 4 CREAM TOPICAL at 04:18

## 2021-04-15 RX ADMIN — Medication 1000 MILLIGRAM(S): at 05:47

## 2021-04-15 RX ADMIN — HYDROMORPHONE HYDROCHLORIDE 0.5 MILLIGRAM(S): 2 INJECTION INTRAMUSCULAR; INTRAVENOUS; SUBCUTANEOUS at 09:00

## 2021-04-15 RX ADMIN — Medication 400 MILLIGRAM(S): at 23:25

## 2021-04-15 RX ADMIN — Medication 1000 MILLIGRAM(S): at 14:14

## 2021-04-15 NOTE — PROVIDER CONTACT NOTE (CRITICAL VALUE NOTIFICATION) - BACKGROUND
55 yr M admitted with hematemesis, gastric ulcer, PMH anemia, Billroth II
55 yr M admitted with hematemesis, gastric ulcer, PMH anemia, Billroth II

## 2021-04-15 NOTE — PROGRESS NOTE ADULT - SUBJECTIVE AND OBJECTIVE BOX
Chief Complaint:  Patient is a 55y old  Male who presents with a chief complaint of GI bleed (14 Apr 2021 02:52)      Interval Events:   - Hb 4.4 --> 5U pRBCs --> 7.3   - No further episodes of hematemesis today (last in AM in ED)   - Denies BMs today  - Endorses mild abd pain diffusely   - Continues to complain of back pain (radiating from lumbosacral region to b/l legs)   - C/o being cold, wrapped in multiple blankets         Allergies:  latex (Rash)  No Known Drug Allergies  shellfish ([place])        Hospital Medications:  diphenhydrAMINE   Injectable 25 milliGRAM(s) IV Push daily  immune   globulin 10% (GAMMAGARD) IVPB 75 Gram(s) IV Intermittent daily  lactated ringers. 1000 milliLiter(s) IV Continuous <Continuous>  lidocaine   Patch 1 Patch Transdermal daily PRN  pantoprazole Infusion 8 mG/Hr IV Continuous <Continuous>      PMHX/PSHX:  Gastric ulcer    Chronic anemia    S/P partial gastrectomy    History of Billroth II operation        Family history:  No pertinent family history in first degree relatives        ROS:     General:  No wt loss, fevers, chills, night sweats, fatigue,   Eyes:  Good vision, no reported pain  ENT:  No sore throat, pain, runny nose, dysphagia  CV:  No pain, palpitations, hypo/hypertension  Pulm:  No dyspnea, cough, tachypnea, wheezing  GI: see above   :  No pain, bleeding, incontinence, nocturia  Muscle:  No pain, weakness  Neuro:  No weakness, tingling, memory problems  Psych:  No fatigue, insomnia, mood problems, depression  Endocrine:  No polyuria, polydipsia, cold/heat intolerance  Heme:  No petechiae, ecchymosis, easy bruisability  Skin:  No rash, tattoos, scars, edema      PHYSICAL EXAM:   Vital Signs:  Vital Signs Last 24 Hrs  T(C): 36.3 (14 Apr 2021 08:29), Max: 37.9 (13 Apr 2021 23:45)  T(F): 97.4 (14 Apr 2021 08:29), Max: 100.2 (13 Apr 2021 23:45)  HR: 77 (14 Apr 2021 08:29) (62 - 91)  BP: 126/76 (14 Apr 2021 08:29) (115/66 - 143/77)  BP(mean): --  RR: 18 (14 Apr 2021 08:29) (15 - 18)  SpO2: 99% (14 Apr 2021 08:29) (98% - 100%)  Daily Height in cm: 190.5 (13 Apr 2021 17:10)    Daily     GENERAL:  No acute distress  HEENT:  Normocephalic/atraumtic,  no scleral icterus  CHEST:  Clear to auscultation bilaterally, no wheezes/rales/ronchi, no accessory muscle use  HEART:  Regular rate and rhythm, no murmurs/rubs/gallops  ABDOMEN:  Soft, non-tender, non-distended, normoactive bowel sounds, no masses  EXTREMITIES:  No cyanosis, clubbing, or edema  SKIN:  No rash/erythema/ecchymoses/petechiae/wounds/abscess/warm/dry  NEURO:  Alert and oriented x 3      LABS:                        7.3    6.88  )-----------( 20       ( 15 Apr 2021 07:22 )             24.5     04-15    137  |  102  |  6<L>  ----------------------------<  106<H>  3.6   |  25  |  0.70    Ca    8.6      15 Apr 2021 07:22  Phos  3.5     04-15  Mg     1.6     04-15    TPro  5.8<L>  /  Alb  3.4  /  TBili  0.7  /  DBili  x   /  AST  19  /  ALT  13  /  AlkPhos  72  04-13    LIVER FUNCTIONS - ( 13 Apr 2021 20:09 )  Alb: 3.4 g/dL / Pro: 5.8 g/dL / ALK PHOS: 72 U/L / ALT: 13 U/L / AST: 19 U/L / GGT: x           PT/INR - ( 14 Apr 2021 07:18 )   PT: 13.6 sec;   INR: 1.19 ratio         PTT - ( 14 Apr 2021 07:18 )  PTT:27.7 sec                  Imaging:

## 2021-04-15 NOTE — PROVIDER CONTACT NOTE (CRITICAL VALUE NOTIFICATION) - ASSESSMENT
A&Ox4, VS stable,
A&Ox4, VS stable, patient feels lethargic, no episodes of hematemesis or bloody stool since 5pm admission to floor. s/p 2 units PRBC and 1 unit platelets

## 2021-04-15 NOTE — PROGRESS NOTE ADULT - SUBJECTIVE AND OBJECTIVE BOX
Name of Patient : BLANCA PUCKETT  MRN: [id number]  DATE OF SERVICE: 04-15-21     Subjective: Patient seen and examined. No new events except as noted.     REVIEW OF SYSTEMS:    CONSTITUTIONAL: No weakness, fevers or chills  EYES/ENT: No visual changes;  No vertigo or throat pain   NECK: No pain or stiffness  RESPIRATORY: No cough, wheezing, hemoptysis; No shortness of breath  CARDIOVASCULAR: No chest pain or palpitations  GASTROINTESTINAL: No abdominal or epigastric pain. No nausea, vomiting, or hematemesis; No diarrhea or constipation. No melena or hematochezia.  GENITOURINARY: No dysuria, frequency or hematuria  NEUROLOGICAL: No numbness or weakness  SKIN: No itching, burning, rashes, or lesions   All other review of systems is negative unless indicated above.    MEDICATIONS:  MEDICATIONS  (STANDING):  diphenhydrAMINE   Injectable 25 milliGRAM(s) IV Push daily  folic acid Injectable 1 milliGRAM(s) IV Push daily  iron sucrose IVPB 300 milliGRAM(s) IV Intermittent every 24 hours  lactated ringers. 1000 milliLiter(s) (100 mL/Hr) IV Continuous <Continuous>  melatonin 3 milliGRAM(s) Oral at bedtime  pantoprazole Infusion 8 mG/Hr (10 mL/Hr) IV Continuous <Continuous>      PHYSICAL EXAM:  T(C): 38.1 (04-15-21 @ 23:15), Max: 38.1 (04-15-21 @ 23:15)  HR: 66 (04-15-21 @ 23:15) (66 - 83)  BP: 134/73 (04-15-21 @ 23:15) (132/66 - 160/80)  RR: 16 (04-15-21 @ 23:15) (16 - 18)  SpO2: 100% (04-15-21 @ 23:15) (96% - 100%)  Wt(kg): --  I&O's Summary    14 Apr 2021 07:01  -  15 Apr 2021 07:00  --------------------------------------------------------  IN: 3540 mL / OUT: 1250 mL / NET: 2290 mL    15 Apr 2021 07:01  -  15 Apr 2021 23:42  --------------------------------------------------------  IN: 1920 mL / OUT: 900 mL / NET: 1020 mL          Appearance: Normal	  HEENT:  PERRLA   Lymphatic: No lymphadenopathy   Cardiovascular: Normal S1 S2, no JVD  Respiratory: normal effort , clear  Gastrointestinal:  Soft, Non-tender  Skin: No rashes,  warm to touch  Psychiatry:  Mood & affect appropriate  Musculuskeletal: No edema      All labs, Imaging and EKGs personally reviewed       04-14-21 @ 07:01  -  04-15-21 @ 07:00  --------------------------------------------------------  IN: 3540 mL / OUT: 1250 mL / NET: 2290 mL    04-15-21 @ 07:01  -  04-15-21 @ 23:42  --------------------------------------------------------  IN: 1920 mL / OUT: 900 mL / NET: 1020 mL                            7.4    7.50  )-----------( 27       ( 15 Apr 2021 14:28 )             24.4               04-15    137  |  102  |  6<L>  ----------------------------<  106<H>  3.6   |  25  |  0.70    Ca    8.6      15 Apr 2021 07:22  Phos  3.5     04-15  Mg     1.6     04-15      PT/INR - ( 14 Apr 2021 07:18 )   PT: 13.6 sec;   INR: 1.19 ratio         PTT - ( 14 Apr 2021 07:18 )  PTT:27.7 sec

## 2021-04-15 NOTE — PROGRESS NOTE ADULT - ASSESSMENT
55M with hx of PUD s/p distal gastrectomy with Billroth II reconstruction, c/b jejunogastric intussusception (2017) s/p EGD, chronic anemia, presents with hematemesis, likely 2/2 upper GI bleed from recurrent peptic ulcer vs marginal ulcer    PLAN:   - NPO/IVF  - trend labs  - transfuse pRBC, platelet PRN  - appreciate Heme recs, IVIG initiated  - serial CBC's   - Protonix BID  - possible EGD today with GI  - given recurrent jejunogastric intussusception, will benefit from surgical repair after acute anemia resolves     A Team Surgery  [id number]   55M with hx of PUD s/p distal gastrectomy with Billroth II reconstruction, c/b jejunogastric intussusception (2017) s/p EGD, chronic anemia, presents with hematemesis, likely 2/2 upper GI bleed from recurrent peptic ulcer vs marginal ulcer    PLAN:   - NPO/IVF  - trend labs. AM CBC improved 7.3/24.5  - transfuse pRBC, platelet PRN  - appreciate Heme recs, IVIG initiated  - serial CBC's   - Protonix BID  - possible EGD, will discuss with GI  - given recurrent jejunogastric intussusception, will benefit from surgical repair after acute anemia resolves     A Team Surgery  [id number]

## 2021-04-15 NOTE — PROGRESS NOTE ADULT - SUBJECTIVE AND OBJECTIVE BOX
SURGERY PROGRESS NOTE    SUBJECTIVE / 24H EVENTS:  Patient seen and examined on morning rounds. No acute events overnight. S/p 1upRBC yesterday evening. Started on supplemental iron and folate      OBJECTIVE:  VITAL SIGNS:  T(C): 36.6 (04-15-21 @ 02:09), Max: 37.5 (04-14-21 @ 05:17)  HR: 68 (04-15-21 @ 02:09) (62 - 91)  BP: 143/86 (04-15-21 @ 02:09) (103/72 - 157/85)  RR: 18 (04-15-21 @ 02:09) (15 - 18)  SpO2: 98% (04-15-21 @ 02:09) (98% - 100%)  Daily     Daily       PHYSICAL EXAM:  Gen: NAD, resting in bed, alert and responding appropriately  Resp: Airway patent, non-labored respirations  Abd: midline incisional scar above umbilicus noted. Soft, ND, NT, no rebound or guarding  Ext: No edema, WWP  Neuro: AAOx3, no focal deficits        04-13-21 @ 07:01  -  04-14-21 @ 07:00  --------------------------------------------------------  IN:    Lactated Ringers: 900 mL    Pantoprazole: 60 mL    Platelets - Single Donor: 195 mL    PRBCs (Packed Red Blood Cells): 600 mL  Total IN: 1755 mL    OUT:    Estimated Blood Loss (mL): 0 mL    IV PiggyBack: 0 mL    Oral Fluid: 0 mL    Voided (mL): 1800 mL  Total OUT: 1800 mL    Total NET: -45 mL      04-14-21 @ 07:01  -  04-15-21 @ 02:36  --------------------------------------------------------  IN:    IV PiggyBack: 550 mL    Lactated Ringers: 1900 mL    Oral Fluid: 360 mL    Pantoprazole: 190 mL  Total IN: 3000 mL    OUT:    Estimated Blood Loss (mL): 0 mL    Voided (mL): 1150 mL  Total OUT: 1150 mL    Total NET: 1850 mL          LAB VALUES:  04-14    138  |  102  |  4<L>  ----------------------------<  101<H>  3.7   |  28  |  0.73    Ca    8.9      14 Apr 2021 07:18  Phos  2.4     04-14  Mg     1.4     04-14    TPro  5.8<L>  /  Alb  3.4  /  TBili  0.7  /  DBili  x   /  AST  19  /  ALT  13  /  AlkPhos  72  04-13                               6.8    5.63  )-----------( 14       ( 14 Apr 2021 07:18 )             22.7     LIVER FUNCTIONS - ( 13 Apr 2021 20:09 )  Alb: 3.4 g/dL / Pro: 5.8 g/dL / ALK PHOS: 72 U/L / ALT: 13 U/L / AST: 19 U/L / GGT: x           PT/INR - ( 14 Apr 2021 07:18 )   PT: 13.6 sec;   INR: 1.19 ratio         PTT - ( 14 Apr 2021 07:18 )  PTT:27.7 sec            MICROBIOLOGY:      RADIOLOGY:        MEDICATIONS  (STANDING):  diphenhydrAMINE   Injectable 25 milliGRAM(s) IV Push daily  folic acid Injectable 1 milliGRAM(s) IV Push daily  immune   globulin 10% (GAMMAGARD) IVPB 75 Gram(s) IV Intermittent daily  iron sucrose IVPB 300 milliGRAM(s) IV Intermittent every 24 hours  lactated ringers. 1000 milliLiter(s) (100 mL/Hr) IV Continuous <Continuous>  melatonin 3 milliGRAM(s) Oral at bedtime  pantoprazole Infusion 8 mG/Hr (10 mL/Hr) IV Continuous <Continuous>    MEDICATIONS  (PRN):  lidocaine   Patch 1 Patch Transdermal daily PRN Back pain

## 2021-04-15 NOTE — PROGRESS NOTE ADULT - ASSESSMENT
55 M Hx of PUD s/p Bilroth II gastrojejunostomy for perforation (>25 years ago) with multiple hospitalizations for UGIB (2014, 2016, 2017) now presenting for CGE/hematemesis x 1 day.       IMPRESSION:   #Hematemesis: pt w/ multiple episodes of CGE + hematemesis after multiple episodes of vomiting w/ Hb 4.4 on admission now s/p 5U pRBCs w/ Hb 7.3. HD stable. Ddx UGIB 2/2 PUD v MWT v erosive esophagitis v erosive gastropathy vs malignancy v unlikely EV/GV without portal HTN. Pt needs resuscitation prior to EGD (Hb > 7, PLT > 50)  #Microcytic anemia: on admission Hb in 4-5s, anemia studies c/w VEL in the past, with likely also component of acute blood loss anemia.   #N/V: likely in setting of intussusception seen on CT a/p (4/13/21) at site of Bilroth II. Also with prior admission for  intussusception which resolved (11/2017).  #Thrombocytopenia likely 2/2 ITP: PLT 11 on admission. BM biopsy (2016) c/w ITP. Started on IVIG (as opposed to steroids since they can worsen GI bleeding) 6hrs x 2 days (4/15-16)  #PUD s/p Bilroth II gastrojejunostomy for perforation (>25 years ago)      RECOMMENDATIONS:   - PPI gtt  - Trend CBC, transfuse Hb < 7, PLT <50 in setting of bleeding  - Active T&S  - Pt will need severe thrombocytopenia + anemia corrected prior to EGD (at higher risk of trauma/bleeding from endoscopy)   - Appreciate surgery recs re: intussusception (currently planning for surgical repair due to recurrent episodes of intussusception)   - Appreciate heme/onc recs        Thank you for involving us in the care of this patient, please reach out if any further questions.     Ernesto Nava MD  Gastroenterology Fellow, PGY4    Available on Microsoft Teams  [phone] ([facility])  [phone] ([facility])  Please contact on call fellow weekdays after 5pm-7am and weekends: [phone] 55 M Hx of PUD s/p Bilroth II gastrojejunostomy for perforation (>25 years ago) with multiple hospitalizations for UGIB (2014, 2016, 2017) now presenting for CGE/hematemesis x 1 day.       IMPRESSION:   #Hematemesis: pt w/ multiple episodes of CGE + hematemesis after multiple episodes of vomiting w/ Hb 4.4 on admission now s/p 5U pRBCs w/ Hb 7.3. HD stable. Ddx UGIB 2/2 PUD v MWT v erosive esophagitis v erosive gastropathy vs malignancy v unlikely EV/GV without portal HTN. Pt needs resuscitation prior to EGD (Hb > 7, PLT > 50)  #Microcytic anemia: on admission Hb in 4-5s, anemia studies c/w VEL in the past, with likely also component of acute blood loss anemia.   #N/V: likely in setting of intussusception seen on CT a/p (4/13/21) at site of Bilroth II. Also with prior admission for  intussusception which resolved (11/2017).  #Thrombocytopenia likely 2/2 ITP: PLT 11 on admission. BM biopsy (2016) c/w ITP. Started on IVIG (as opposed to steroids since they can worsen GI bleeding) 6hrs x 2 days (4/15-16)  #PUD s/p Bilroth II gastrojejunostomy for perforation (>25 years ago)      RECOMMENDATIONS:   - PPI gtt  - Trend CBC, transfuse Hb < 7, PLT <50 in setting of bleeding and for procedural optimization  - Active T&S  - Pt will need severe thrombocytopenia + anemia corrected prior to EGD (at higher risk of trauma/bleeding from endoscopy)   - Appreciate surgery recs re: intussusception (currently planning for surgical repair due to recurrent episodes of intussusception)   - Appreciate heme/onc recs        Thank you for involving us in the care of this patient, please reach out if any further questions.     Ernesto Nava MD  Gastroenterology Fellow, PGY4    Available on Microsoft Teams  [phone] ([facility])  [phone] ([facility])  Please contact on call fellow weekdays after 5pm-7am and weekends: [phone]

## 2021-04-16 LAB
ANION GAP SERPL CALC-SCNC: 8 MMOL/L — SIGNIFICANT CHANGE UP (ref 7–14)
BLD GP AB SCN SERPL QL: NEGATIVE — SIGNIFICANT CHANGE UP
BUN SERPL-MCNC: 7 MG/DL — SIGNIFICANT CHANGE UP (ref 7–23)
CALCIUM SERPL-MCNC: 8.4 MG/DL — SIGNIFICANT CHANGE UP (ref 8.4–10.5)
CHLORIDE SERPL-SCNC: 103 MMOL/L — SIGNIFICANT CHANGE UP (ref 98–107)
CO2 SERPL-SCNC: 26 MMOL/L — SIGNIFICANT CHANGE UP (ref 22–31)
CREAT SERPL-MCNC: 0.68 MG/DL — SIGNIFICANT CHANGE UP (ref 0.5–1.3)
GLUCOSE SERPL-MCNC: 97 MG/DL — SIGNIFICANT CHANGE UP (ref 70–99)
HCT VFR BLD CALC: 23.6 % — LOW (ref 39–50)
HCT VFR BLD CALC: 24.8 % — LOW (ref 39–50)
HGB BLD-MCNC: 7.1 G/DL — LOW (ref 13–17)
HGB BLD-MCNC: 7.3 G/DL — LOW (ref 13–17)
MAGNESIUM SERPL-MCNC: 1.7 MG/DL — SIGNIFICANT CHANGE UP (ref 1.6–2.6)
MCHC RBC-ENTMCNC: 21.6 PG — LOW (ref 27–34)
MCHC RBC-ENTMCNC: 21.9 PG — LOW (ref 27–34)
MCHC RBC-ENTMCNC: 29.2 GM/DL — LOW (ref 32–36)
MCHC RBC-ENTMCNC: 29.4 GM/DL — LOW (ref 32–36)
MCV RBC AUTO: 74 FL — LOW (ref 80–100)
MCV RBC AUTO: 74.5 FL — LOW (ref 80–100)
NRBC # BLD: 6 /100 WBCS — SIGNIFICANT CHANGE UP
NRBC # BLD: 6 /100 WBCS — SIGNIFICANT CHANGE UP
NRBC # FLD: 0.36 K/UL — HIGH
NRBC # FLD: 0.42 K/UL — HIGH
PHOSPHATE SERPL-MCNC: 4.2 MG/DL — SIGNIFICANT CHANGE UP (ref 2.5–4.5)
PLATELET # BLD AUTO: 29 K/UL — LOW (ref 150–400)
PLATELET # BLD AUTO: 51 K/UL — LOW (ref 150–400)
POTASSIUM SERPL-MCNC: 3.3 MMOL/L — LOW (ref 3.5–5.3)
POTASSIUM SERPL-SCNC: 3.3 MMOL/L — LOW (ref 3.5–5.3)
RBC # BLD: 3.19 M/UL — LOW (ref 4.2–5.8)
RBC # BLD: 3.33 M/UL — LOW (ref 4.2–5.8)
RBC # FLD: SIGNIFICANT CHANGE UP (ref 10.3–14.5)
RBC # FLD: SIGNIFICANT CHANGE UP (ref 10.3–14.5)
RH IG SCN BLD-IMP: POSITIVE — SIGNIFICANT CHANGE UP
SODIUM SERPL-SCNC: 137 MMOL/L — SIGNIFICANT CHANGE UP (ref 135–145)
WBC # BLD: 6.23 K/UL — SIGNIFICANT CHANGE UP (ref 3.8–10.5)
WBC # BLD: 6.76 K/UL — SIGNIFICANT CHANGE UP (ref 3.8–10.5)
WBC # FLD AUTO: 6.23 K/UL — SIGNIFICANT CHANGE UP (ref 3.8–10.5)
WBC # FLD AUTO: 6.76 K/UL — SIGNIFICANT CHANGE UP (ref 3.8–10.5)

## 2021-04-16 PROCEDURE — 43235 EGD DIAGNOSTIC BRUSH WASH: CPT | Mod: GC

## 2021-04-16 RX ORDER — ACETAMINOPHEN 500 MG
1000 TABLET ORAL ONCE
Refills: 0 | Status: COMPLETED | OUTPATIENT
Start: 2021-04-16 | End: 2021-04-16

## 2021-04-16 RX ORDER — HYDROMORPHONE HYDROCHLORIDE 2 MG/ML
0.5 INJECTION INTRAMUSCULAR; INTRAVENOUS; SUBCUTANEOUS ONCE
Refills: 0 | Status: DISCONTINUED | OUTPATIENT
Start: 2021-04-16 | End: 2021-04-16

## 2021-04-16 RX ORDER — MAGNESIUM SULFATE 500 MG/ML
2 VIAL (ML) INJECTION ONCE
Refills: 0 | Status: COMPLETED | OUTPATIENT
Start: 2021-04-16 | End: 2021-04-16

## 2021-04-16 RX ORDER — POTASSIUM CHLORIDE 20 MEQ
10 PACKET (EA) ORAL
Refills: 0 | Status: COMPLETED | OUTPATIENT
Start: 2021-04-16 | End: 2021-04-16

## 2021-04-16 RX ADMIN — IRON SUCROSE 176.67 MILLIGRAM(S): 20 INJECTION, SOLUTION INTRAVENOUS at 01:01

## 2021-04-16 RX ADMIN — PANTOPRAZOLE SODIUM 10 MG/HR: 20 TABLET, DELAYED RELEASE ORAL at 09:49

## 2021-04-16 RX ADMIN — LIDOCAINE 1 PATCH: 4 CREAM TOPICAL at 22:15

## 2021-04-16 RX ADMIN — Medication 100 MILLIEQUIVALENT(S): at 09:49

## 2021-04-16 RX ADMIN — IRON SUCROSE 176.67 MILLIGRAM(S): 20 INJECTION, SOLUTION INTRAVENOUS at 22:15

## 2021-04-16 RX ADMIN — HYDROMORPHONE HYDROCHLORIDE 0.5 MILLIGRAM(S): 2 INJECTION INTRAMUSCULAR; INTRAVENOUS; SUBCUTANEOUS at 13:30

## 2021-04-16 RX ADMIN — Medication 1000 MILLIGRAM(S): at 11:11

## 2021-04-16 RX ADMIN — HYDROMORPHONE HYDROCHLORIDE 0.5 MILLIGRAM(S): 2 INJECTION INTRAMUSCULAR; INTRAVENOUS; SUBCUTANEOUS at 13:35

## 2021-04-16 RX ADMIN — Medication 50 GRAM(S): at 10:42

## 2021-04-16 RX ADMIN — SODIUM CHLORIDE 100 MILLILITER(S): 9 INJECTION, SOLUTION INTRAVENOUS at 09:48

## 2021-04-16 RX ADMIN — Medication 100 MILLIEQUIVALENT(S): at 08:06

## 2021-04-16 RX ADMIN — Medication 400 MILLIGRAM(S): at 10:41

## 2021-04-16 RX ADMIN — Medication 3 MILLIGRAM(S): at 22:15

## 2021-04-16 RX ADMIN — Medication 1 MILLIGRAM(S): at 13:30

## 2021-04-16 RX ADMIN — Medication 100 MILLIEQUIVALENT(S): at 06:42

## 2021-04-16 NOTE — CHART NOTE - NSCHARTNOTEFT_GEN_A_CORE
SURGERY POST-OP NOTE:    S: Patient underwent and tolerated procedure without issue and sent from endoscopy suite to floor. Patient denies chest pain, shortness of breath, nausea, vomiting, lightheadedness, or dizziness. Pain was well controlled.      O:  T(C): 37.6 (04-16-21 @ 18:56), Max: 37.6 (04-16-21 @ 18:56)  HR: 56 (04-16-21 @ 18:56) (53 - 67)  BP: 147/84 (04-16-21 @ 18:56) (139/72 - 147/84)  RR: 14 (04-16-21 @ 18:56) (14 - 16)  SpO2: 100% (04-16-21 @ 18:56) (95% - 100%)  Wt(kg): --                        7.3    6.23  )-----------( 51       ( 16 Apr 2021 04:08 )             24.8        04-16    137  |  103  |  7   ----------------------------<  97  3.3<L>   |  26  |  0.68    Ca    8.4      16 Apr 2021 04:08  Phos  4.2     04-16  Mg     1.7     04-16      Gen: NAD, resting in bed, alert and responding appropriately  Resp: Non-labored respirations  Abd: Soft, nontender, nondistended  Ext: Grossly moving all extremities      Assessment/Plan:  55y Male now POD#0 s/p EGD, tolerated well    - Pain control  - NPO  - DVT ppx: Lovenox  - Incentive spirometry    Dispo: Floor SURGERY POST-OP NOTE:    S: Patient underwent and tolerated procedure without issue and sent from endoscopy suite to floor. Patient denies chest pain, shortness of breath, nausea, vomiting, lightheadedness, or dizziness. Patient endorsing some soreness along right side but pain otherwise controlled.     O:  T(C): 37.6 (04-16-21 @ 18:56), Max: 37.6 (04-16-21 @ 18:56)  HR: 56 (04-16-21 @ 18:56) (53 - 67)  BP: 147/84 (04-16-21 @ 18:56) (139/72 - 147/84)  RR: 14 (04-16-21 @ 18:56) (14 - 16)  SpO2: 100% (04-16-21 @ 18:56) (95% - 100%)  Wt(kg): --                        7.3    6.23  )-----------( 51       ( 16 Apr 2021 04:08 )             24.8        04-16    137  |  103  |  7   ----------------------------<  97  3.3<L>   |  26  |  0.68    Ca    8.4      16 Apr 2021 04:08  Phos  4.2     04-16  Mg     1.7     04-16      Gen: NAD, resting in bed, alert and responding appropriately  Resp: Non-labored respirations  Abd: Soft, nontender, nondistended  Ext: Grossly moving all extremities      Assessment/Plan:  55y Male now POD#0 s/p EGD, tolerated well    - Pain control  - NPO  - DVT ppx: Lovenox  - Incentive spirometry    Dispo: Floor

## 2021-04-16 NOTE — PROGRESS NOTE ADULT - SUBJECTIVE AND OBJECTIVE BOX
SURGERY PROGRESS NOTE    SUBJECTIVE / 24H EVENTS:  Patient seen and examined on morning rounds. s/p 3u Plt overnight.       OBJECTIVE:  VITAL SIGNS:  T(C): 37.4 (04-15-21 @ 23:55), Max: 38.1 (04-15-21 @ 23:15)  HR: 66 (04-15-21 @ 23:15) (66 - 83)  BP: 134/73 (04-15-21 @ 23:15) (132/66 - 160/80)  RR: 16 (04-15-21 @ 23:15) (16 - 18)  SpO2: 100% (04-15-21 @ 23:15) (96% - 100%)  Daily     Daily       PHYSICAL EXAM:  Gen: NAD, resting in bed, alert and responding appropriately  Resp: Airway patent, non-labored respirations  Abd: midline incisional scar above umbilicus noted. Soft, ND, NT, no rebound or guarding  Ext: No edema, WWP  Neuro: AAOx3, no focal deficits      04-14-21 @ 07:01  -  04-15-21 @ 07:00  --------------------------------------------------------  IN:    IV PiggyBack: 650 mL    Lactated Ringers: 2300 mL    Oral Fluid: 360 mL    Pantoprazole: 230 mL  Total IN: 3540 mL    OUT:    Estimated Blood Loss (mL): 0 mL    Voided (mL): 1250 mL  Total OUT: 1250 mL    Total NET: 2290 mL      04-15-21 @ 07:01  -  04-16-21 @ 01:54  --------------------------------------------------------  IN:    IV PiggyBack: 950 mL    Lactated Ringers: 2000 mL    Pantoprazole: 200 mL    Platelets - Single Donor: 200 mL  Total IN: 3350 mL    OUT:    Estimated Blood Loss (mL): 0 mL    Oral Fluid: 0 mL    Voided (mL): 1400 mL  Total OUT: 1400 mL    Total NET: 1950 mL          LAB VALUES:  04-15    137  |  102  |  6<L>  ----------------------------<  106<H>  3.6   |  25  |  0.70    Ca    8.6      15 Apr 2021 07:22  Phos  3.5     04-15  Mg     1.6     04-15                                 7.4    7.50  )-----------( 27       ( 15 Apr 2021 14:28 )             24.4       PT/INR - ( 14 Apr 2021 07:18 )   PT: 13.6 sec;   INR: 1.19 ratio         PTT - ( 14 Apr 2021 07:18 )  PTT:27.7 sec            MICROBIOLOGY:      RADIOLOGY:        MEDICATIONS  (STANDING):  diphenhydrAMINE   Injectable 25 milliGRAM(s) IV Push daily  folic acid Injectable 1 milliGRAM(s) IV Push daily  iron sucrose IVPB 300 milliGRAM(s) IV Intermittent every 24 hours  lactated ringers. 1000 milliLiter(s) (100 mL/Hr) IV Continuous <Continuous>  melatonin 3 milliGRAM(s) Oral at bedtime  pantoprazole Infusion 8 mG/Hr (10 mL/Hr) IV Continuous <Continuous>    MEDICATIONS  (PRN):  lidocaine   Patch 1 Patch Transdermal daily PRN Back pain

## 2021-04-16 NOTE — PROGRESS NOTE ADULT - ASSESSMENT
55M with hx of PUD s/p distal gastrectomy with Billroth II reconstruction, c/b jejunogastric intussusception (2017) s/p EGD, chronic anemia, presents with hematemesis, likely 2/2 upper GI bleed from recurrent peptic ulcer vs marginal ulcer    PLAN:   - EGD with GI today  - NPO/IVF  - trend labs. AM CBC improved 7.3/24.5  - transfuse pRBC, platelet PRN  - appreciate Heme recs, IVIG initiated  - serial CBC's   - Protonix BID  - given recurrent jejunogastric intussusception, will benefit from surgical repair after acute anemia resolves     A Team Surgery  [id number]

## 2021-04-16 NOTE — PROGRESS NOTE ADULT - SUBJECTIVE AND OBJECTIVE BOX
Name of Patient : BLANCA PUCKETT  MRN: [id number]  DATE OF SERVICE: 04-16-21    Subjective: Patient seen and examined. No new events except as noted.   Doing okay   plan for scope today per GI     REVIEW OF SYSTEMS:    CONSTITUTIONAL: No weakness, fevers or chills  EYES/ENT: No visual changes;  No vertigo or throat pain   NECK: No pain or stiffness  RESPIRATORY: No cough, wheezing, hemoptysis; No shortness of breath  CARDIOVASCULAR: No chest pain or palpitations  GASTROINTESTINAL: No abdominal or epigastric pain. No nausea, vomiting, or hematemesis; No diarrhea or constipation. No melena or hematochezia.  GENITOURINARY: No dysuria, frequency or hematuria  NEUROLOGICAL: No numbness or weakness  SKIN: No itching, burning, rashes, or lesions   All other review of systems is negative unless indicated above.    MEDICATIONS:  MEDICATIONS  (STANDING):  diphenhydrAMINE   Injectable 25 milliGRAM(s) IV Push daily  folic acid Injectable 1 milliGRAM(s) IV Push daily  iron sucrose IVPB 300 milliGRAM(s) IV Intermittent every 24 hours  lactated ringers. 1000 milliLiter(s) (100 mL/Hr) IV Continuous <Continuous>  melatonin 3 milliGRAM(s) Oral at bedtime  pantoprazole Infusion 8 mG/Hr (10 mL/Hr) IV Continuous <Continuous>      PHYSICAL EXAM:  T(C): 37.4 (04-16-21 @ 14:47), Max: 38.1 (04-15-21 @ 23:15)  HR: 53 (04-16-21 @ 15:17) (53 - 70)  BP: 140/60 (04-16-21 @ 15:17) (129/67 - 147/81)  RR: 15 (04-16-21 @ 15:17) (15 - 17)  SpO2: 95% (04-16-21 @ 15:17) (95% - 100%)  Wt(kg): --  I&O's Summary    15 Apr 2021 07:01  -  16 Apr 2021 07:00  --------------------------------------------------------  IN: 3990 mL / OUT: 2100 mL / NET: 1890 mL    16 Apr 2021 07:01  -  16 Apr 2021 18:32  --------------------------------------------------------  IN: 1370 mL / OUT: 600 mL / NET: 770 mL      Height (cm): 190.5 (04-16 @ 13:38)  Weight (kg): 75 (04-16 @ 13:38)  BMI (kg/m2): 20.7 (04-16 @ 13:38)  BSA (m2): 2.02 (04-16 @ 13:38)    Appearance: Normal	  HEENT:  PERRLA   Lymphatic: No lymphadenopathy   Cardiovascular: Normal S1 S2, no JVD  Respiratory: normal effort , clear  Gastrointestinal:  Soft, Non-tender  Skin: No rashes,  warm to touch  Psychiatry:  Mood & affect appropriate  Musculuskeletal: No edema      All labs, Imaging and EKGs personally reviewed         04-15-21 @ 07:01  -  04-16-21 @ 07:00  --------------------------------------------------------  IN: 3990 mL / OUT: 2100 mL / NET: 1890 mL    04-16-21 @ 07:01  -  04-16-21 @ 18:32  --------------------------------------------------------  IN: 1370 mL / OUT: 600 mL / NET: 770 mL                              7.3    6.23  )-----------( 51       ( 16 Apr 2021 04:08 )             24.8               04-16    137  |  103  |  7   ----------------------------<  97  3.3<L>   |  26  |  0.68    Ca    8.4      16 Apr 2021 04:08  Phos  4.2     04-16  Mg     1.7     04-16

## 2021-04-16 NOTE — PROGRESS NOTE ADULT - ASSESSMENT
patient is a 55 year old male with PMhx of PUD s/p distal gastrectomy with Billroth II reconstruction c/b jejunogastric intussusception (2017), iron deficiency anemia  and ITP who presents with hematemesis and     Problem/Recommendation - 1:  Problem: Hematemesis. Recommendation: acute blood loss anemia   GI eval appreciated  rescucitaiton with PRBC transfusion  plan for EGD today   R/O PUD v MWT v erosive esophagitis v erosive gastropathy vs malignancy v unlikely EV/GV without portal HTN.    Problem/Recommendation - 2:  ·  Problem: Intussusception.  Recommendation: Surgery as per surg team  plan for OR after medically optimized  NPO cont   IV hydration  active T&S , transfuse to keep hgb above 7.     Problem/Recommendation - 3:  ·  Problem: Thrombocytopenia.  Recommendation: ITP   with acute GI bleeding   transfuse to keep plt above 50 however not going to repond in view of ITP. plan of care per hematology   high risk for prednisone, hematology eval appreciated, plan for IVIG, premed per recs   monitor plt level , CBC Q12hrs.     Problem/Recommendation - 4:  ·  Problem: Anemia.  Recommendation: UGIB with ITP  Transfuse to keep hgb above 7  GI and hematology eval appreciated  IV hydration   active T&S.    Problem/Recommendation - 5:  ·  Problem: Prophylactic measure.  Recommendation: DVT and gI PPX       Differential diagnosis and plan of care discussed with patient after the evaluation.   Advanced care planning/advanced directives discussed with patient/family. DNR status including forceful chest compressions to attempt to restart the heart, ventilator support/artificial breathing, electric shock, artificial nutrition   OMT on six regions for acute somatic dysfunctions done at the bedside   Importance of Fall prevention discussed.

## 2021-04-17 LAB
ALBUMIN SERPL ELPH-MCNC: 3.5 G/DL — SIGNIFICANT CHANGE UP (ref 3.3–5)
ALP SERPL-CCNC: 80 U/L — SIGNIFICANT CHANGE UP (ref 40–120)
ALT FLD-CCNC: 19 U/L — SIGNIFICANT CHANGE UP (ref 4–41)
ANION GAP SERPL CALC-SCNC: 13 MMOL/L — SIGNIFICANT CHANGE UP (ref 7–14)
AST SERPL-CCNC: 38 U/L — SIGNIFICANT CHANGE UP (ref 4–40)
BILIRUB SERPL-MCNC: 1 MG/DL — SIGNIFICANT CHANGE UP (ref 0.2–1.2)
BUN SERPL-MCNC: 8 MG/DL — SIGNIFICANT CHANGE UP (ref 7–23)
CALCIUM SERPL-MCNC: 8.6 MG/DL — SIGNIFICANT CHANGE UP (ref 8.4–10.5)
CHLORIDE SERPL-SCNC: 98 MMOL/L — SIGNIFICANT CHANGE UP (ref 98–107)
CO2 SERPL-SCNC: 24 MMOL/L — SIGNIFICANT CHANGE UP (ref 22–31)
CREAT SERPL-MCNC: 0.65 MG/DL — SIGNIFICANT CHANGE UP (ref 0.5–1.3)
GLUCOSE SERPL-MCNC: 85 MG/DL — SIGNIFICANT CHANGE UP (ref 70–99)
HCT VFR BLD CALC: 27.6 % — LOW (ref 39–50)
HGB BLD-MCNC: 8.1 G/DL — LOW (ref 13–17)
MAGNESIUM SERPL-MCNC: 1.7 MG/DL — SIGNIFICANT CHANGE UP (ref 1.6–2.6)
MCHC RBC-ENTMCNC: 22.1 PG — LOW (ref 27–34)
MCHC RBC-ENTMCNC: 29.3 GM/DL — LOW (ref 32–36)
MCV RBC AUTO: 75.4 FL — LOW (ref 80–100)
NRBC # BLD: 6 /100 WBCS — SIGNIFICANT CHANGE UP
NRBC # FLD: 0.38 K/UL — HIGH
PHOSPHATE SERPL-MCNC: 4.8 MG/DL — HIGH (ref 2.5–4.5)
PLATELET # BLD AUTO: 56 K/UL — LOW (ref 150–400)
POTASSIUM SERPL-MCNC: 3.2 MMOL/L — LOW (ref 3.5–5.3)
POTASSIUM SERPL-SCNC: 3.2 MMOL/L — LOW (ref 3.5–5.3)
PROT SERPL-MCNC: 7.7 G/DL — SIGNIFICANT CHANGE UP (ref 6–8.3)
RBC # BLD: 3.66 M/UL — LOW (ref 4.2–5.8)
RBC # FLD: SIGNIFICANT CHANGE UP (ref 10.3–14.5)
SODIUM SERPL-SCNC: 135 MMOL/L — SIGNIFICANT CHANGE UP (ref 135–145)
WBC # BLD: 5.97 K/UL — SIGNIFICANT CHANGE UP (ref 3.8–10.5)
WBC # FLD AUTO: 5.97 K/UL — SIGNIFICANT CHANGE UP (ref 3.8–10.5)

## 2021-04-17 RX ORDER — POTASSIUM CHLORIDE 20 MEQ
10 PACKET (EA) ORAL ONCE
Refills: 0 | Status: COMPLETED | OUTPATIENT
Start: 2021-04-17 | End: 2021-04-17

## 2021-04-17 RX ORDER — MAGNESIUM SULFATE 500 MG/ML
2 VIAL (ML) INJECTION ONCE
Refills: 0 | Status: COMPLETED | OUTPATIENT
Start: 2021-04-17 | End: 2021-04-17

## 2021-04-17 RX ORDER — FOLIC ACID 0.8 MG
1 TABLET ORAL DAILY
Refills: 0 | Status: DISCONTINUED | OUTPATIENT
Start: 2021-04-17 | End: 2021-04-24

## 2021-04-17 RX ORDER — ACETAMINOPHEN 500 MG
1000 TABLET ORAL ONCE
Refills: 0 | Status: COMPLETED | OUTPATIENT
Start: 2021-04-17 | End: 2021-04-17

## 2021-04-17 RX ORDER — ACETAMINOPHEN 500 MG
1000 TABLET ORAL ONCE
Refills: 0 | Status: COMPLETED | OUTPATIENT
Start: 2021-04-17 | End: 2021-04-18

## 2021-04-17 RX ADMIN — Medication 400 MILLIGRAM(S): at 03:36

## 2021-04-17 RX ADMIN — LIDOCAINE 1 PATCH: 4 CREAM TOPICAL at 07:25

## 2021-04-17 RX ADMIN — IRON SUCROSE 176.67 MILLIGRAM(S): 20 INJECTION, SOLUTION INTRAVENOUS at 21:43

## 2021-04-17 RX ADMIN — Medication 1000 MILLIGRAM(S): at 04:06

## 2021-04-17 RX ADMIN — PANTOPRAZOLE SODIUM 10 MG/HR: 20 TABLET, DELAYED RELEASE ORAL at 09:00

## 2021-04-17 RX ADMIN — Medication 50 GRAM(S): at 12:00

## 2021-04-17 RX ADMIN — LIDOCAINE 1 PATCH: 4 CREAM TOPICAL at 10:39

## 2021-04-17 RX ADMIN — Medication 1 MILLIGRAM(S): at 19:12

## 2021-04-17 RX ADMIN — SODIUM CHLORIDE 100 MILLILITER(S): 9 INJECTION, SOLUTION INTRAVENOUS at 09:00

## 2021-04-17 RX ADMIN — Medication 100 MILLIEQUIVALENT(S): at 13:01

## 2021-04-17 NOTE — PROGRESS NOTE ADULT - ASSESSMENT
55M with hx of PUD s/p distal gastrectomy with Billroth II reconstruction, c/b jejunogastric intussusception (2017) s/p EGD, chronic anemia, presents with hematemesis, likely 2/2 upper GI bleed from recurrent peptic ulcer vs marginal ulcer. S/p EGD with GI demonstrating multiple non bleeding ulcer, largest 3cm diameter.     PLAN:   - OR planning for early next week  - NPO/IVF  - trend labs. AM CBC improved 7.3/24.5  - transfuse pRBC, platelet PRN  - appreciate Heme recs, IVIG initiated  - serial CBC's   - Protonix BID  - given recurrent jejunogastric intussusception, will benefit from surgical repair after acute anemia resolves     A Team Surgery  [id number]

## 2021-04-17 NOTE — CHART NOTE - NSCHARTNOTEFT_GEN_A_CORE
Hematology Fellow Chart Note    Received call from primary surgery team to ask if splenectomy could be considered given that patient was going to the OR on 4/19/21 for another procedure.    Patient currently getting treatment with first-line treatment (IVIG); per chart, we recommended holding off of steroids in setting of UGIB in setting peptic ulcer vs marginal ulcer, which would also be part of 1st-line treatment. 2nd line treatment would involve the use of rituximab, but given the COVID-19 pandemic, this would increase the risk of profound immunosuppression and complications from COVID-19 due to impaired lymphocyte production.    Splenectomy can be considered for 2nd line treatment given good response, but that would also carry its own risks of immunosuppression. Patient may be able to be a candidate for steroids following planned intervention (?Sujatha-en-y) which could result in improved disease control without need for splenectomy. Other alternative next-line treatment for ITP would also include TPO agonists.    Recommended that the primary surgical team discuss the case further with the hematology team that saw her during the week before making any final decisions with regards to pursuing splenectomy, as patient would also need to have pre-splenectomy vaccinations performed prior to procedure.    Please call with any questions.    Stephan Dubose MD, MPH  Hematology / Oncology Fellow, PGY6  p   On call until 4/18/21 8:00 AM Hematology Fellow Chart Note    Received call from primary surgery team to ask if splenectomy could be considered given that patient was going to the OR on 4/19/21 for another procedure.    Patient currently getting treatment with first-line treatment (IVIG); per chart, we recommended holding off of steroids in setting of UGIB in setting peptic ulcer vs marginal ulcer, which would also be part of 1st-line treatment. 2nd line treatment would involve the use of rituximab, but given the COVID-19 pandemic, this would increase the risk of profound immunosuppression and complications from COVID-19 due to impaired lymphocyte production.    Splenectomy can be considered for 2nd line treatment given good response, but that would also carry its own risks of immunosuppression. Patient may be able to be a candidate for steroids following planned intervention (?Sujatha-en-y) which could result in improved disease control without need for splenectomy.     At least 75% of patients initially respond to corticosteroids, although tapering usually precipitates relapse, and ultimately only 20% to 25% of patients are able to maintain a durable platelet response after steroid discontinuation. Approximately 25% of patients with ITP may achieve a durable remission after treatment with corticosteroids, usually within the first year after presentation. This observation has led to a recommendation by the [employer] that splenectomy be deferred until at least 1 year after presentation, if possible.     Other alternative next-line treatment for ITP would also include TPO agonists.    Given complexities of his particular case, recommended that the primary surgical team discuss the case further with the hematology team that saw patient during the week before making any final decisions with regards to pursuing splenectomy, as patient would also need to have pre-splenectomy vaccinations performed prior to procedure.    Please call with any questions.    Stephan Dubose MD, MPH  Hematology / Oncology Fellow, PGY6  p   On call until 4/18/21 8:00 AM

## 2021-04-17 NOTE — PROGRESS NOTE ADULT - SUBJECTIVE AND OBJECTIVE BOX
Name of Patient : BLANCA PUCKETT  MRN: [id number]  DATE OF SERVICE: 04-17-21    Subjective: Patient seen and examined. No new events except as noted.   doing okay  abdominal pain   S/P EGD    REVIEW OF SYSTEMS:    CONSTITUTIONAL: No weakness, fevers or chills  EYES/ENT: No visual changes;  No vertigo or throat pain   NECK: No pain or stiffness  RESPIRATORY: No cough, wheezing, hemoptysis; No shortness of breath  CARDIOVASCULAR: No chest pain or palpitations  GASTROINTESTINAL: + abdominal discomfort   GENITOURINARY: No dysuria, frequency or hematuria  NEUROLOGICAL: No numbness or weakness  SKIN: No itching, burning, rashes, or lesions   All other review of systems is negative unless indicated above.    MEDICATIONS:  MEDICATIONS  (STANDING):  folic acid 1 milliGRAM(s) Oral daily  iron sucrose IVPB 300 milliGRAM(s) IV Intermittent every 24 hours  lactated ringers. 1000 milliLiter(s) (100 mL/Hr) IV Continuous <Continuous>  melatonin 3 milliGRAM(s) Oral at bedtime  pantoprazole Infusion 8 mG/Hr (10 mL/Hr) IV Continuous <Continuous>      PHYSICAL EXAM:  T(C): 36.9 (04-17-21 @ 22:35), Max: 36.9 (04-17-21 @ 22:35)  HR: 58 (04-17-21 @ 22:35) (55 - 98)  BP: 153/91 (04-17-21 @ 22:35) (128/66 - 155/85)  RR: 17 (04-17-21 @ 22:35) (16 - 17)  SpO2: 100% (04-17-21 @ 22:35) (97% - 100%)  Wt(kg): --  I&O's Summary    16 Apr 2021 07:01  -  17 Apr 2021 07:00  --------------------------------------------------------  IN: 3020 mL / OUT: 1900 mL / NET: 1120 mL    17 Apr 2021 07:01  -  17 Apr 2021 23:03  --------------------------------------------------------  IN: 1470 mL / OUT: 1500 mL / NET: -30 mL          Appearance: Normal	  HEENT:  PERRLA   Lymphatic: No lymphadenopathy   Cardiovascular: Normal S1 S2, no JVD  Respiratory: normal effort , clear  Gastrointestinal:  Soft, Non-tender  Skin: No rashes,  warm to touch  Psychiatry:  Mood & affect appropriate  Musculuskeletal: No edema      All labs, Imaging and EKGs personally reviewed       04-16-21 @ 07:01  -  04-17-21 @ 07:00  --------------------------------------------------------  IN: 3020 mL / OUT: 1900 mL / NET: 1120 mL    04-17-21 @ 07:01  -  04-17-21 @ 23:03  --------------------------------------------------------  IN: 1470 mL / OUT: 1500 mL / NET: -30 mL                            8.1    5.97  )-----------( 56       ( 17 Apr 2021 06:46 )             27.6               04-17    135  |  98  |  8   ----------------------------<  85  3.2<L>   |  24  |  0.65    Ca    8.6      17 Apr 2021 06:46  Phos  4.8     04-17  Mg     1.7     04-17    TPro  7.7  /  Alb  3.5  /  TBili  1.0  /  DBili  x   /  AST  38  /  ALT  19  /  AlkPhos  80  04-17

## 2021-04-17 NOTE — PROGRESS NOTE ADULT - SUBJECTIVE AND OBJECTIVE BOX
SURGERY PROGRESS NOTE    SUBJECTIVE / 24H EVENTS:  Patient seen and examined on morning rounds. No acute events overnight.      OBJECTIVE:  VITAL SIGNS:  T(C): 36.8 (04-17-21 @ 01:45), Max: 37.6 (04-16-21 @ 18:56)  HR: 56 (04-17-21 @ 01:45) (53 - 69)  BP: 147/91 (04-17-21 @ 01:45) (129/67 - 147/91)  RR: 16 (04-17-21 @ 01:45) (14 - 17)  SpO2: 97% (04-17-21 @ 01:45) (95% - 100%)  Daily Height in cm: 190.5 (16 Apr 2021 06:33)    Daily       PHYSICAL EXAM:  Gen: NAD, resting in bed, alert and responding appropriately  Resp: Non-labored respirations  Abd: Soft, nontender, nondistended  Ext: Grossly moving all extremities      04-15-21 @ 07:01  -  04-16-21 @ 07:00  --------------------------------------------------------  IN:    IV PiggyBack: 950 mL    Lactated Ringers: 2400 mL    Pantoprazole: 240 mL    Platelets - Single Donor: 400 mL  Total IN: 3990 mL    OUT:    Estimated Blood Loss (mL): 0 mL    Oral Fluid: 0 mL    Voided (mL): 2100 mL  Total OUT: 2100 mL    Total NET: 1890 mL      04-16-21 @ 07:01  -  04-17-21 @ 04:44  --------------------------------------------------------  IN:    IV PiggyBack: 600 mL    Lactated Ringers: 1800 mL    Pantoprazole: 180 mL  Total IN: 2580 mL    OUT:    Voided (mL): 1500 mL  Total OUT: 1500 mL    Total NET: 1080 mL          LAB VALUES:  04-16    137  |  103  |  7   ----------------------------<  97  3.3<L>   |  26  |  0.68    Ca    8.4      16 Apr 2021 04:08  Phos  4.2     04-16  Mg     1.7     04-16                                 7.3    6.23  )-----------( 51       ( 16 Apr 2021 04:08 )             24.8                   MICROBIOLOGY:      RADIOLOGY:        MEDICATIONS  (STANDING):  diphenhydrAMINE   Injectable 25 milliGRAM(s) IV Push daily  folic acid Injectable 1 milliGRAM(s) IV Push daily  iron sucrose IVPB 300 milliGRAM(s) IV Intermittent every 24 hours  lactated ringers. 1000 milliLiter(s) (100 mL/Hr) IV Continuous <Continuous>  melatonin 3 milliGRAM(s) Oral at bedtime  pantoprazole Infusion 8 mG/Hr (10 mL/Hr) IV Continuous <Continuous>    MEDICATIONS  (PRN):  lidocaine   Patch 1 Patch Transdermal daily PRN Back pain

## 2021-04-17 NOTE — PROGRESS NOTE ADULT - ASSESSMENT
patient is a 55 year old male with PMhx of PUD s/p distal gastrectomy with Billroth II reconstruction c/b jejunogastric intussusception (2017), iron deficiency anemia  and ITP who presents with hematemesis and     Problem/Recommendation - 1:  Problem: Hematemesis. Recommendation: acute blood loss anemia   GI eval appreciated  rescucitaiton with PRBC transfusion  S/P EGD   R/O PUD v MWT v erosive esophagitis v erosive gastropathy vs malignancy v unlikely EV/GV without portal HTN.    Problem/Recommendation - 2:  ·  Problem: Intussusception.  Recommendation: Surgery as per surg team  plan for OR after medically optimized  IV hydration  active T&S , transfuse to keep hgb above 7.     Problem/Recommendation - 3:  ·  Problem: Thrombocytopenia.  Recommendation: ITP   with acute GI bleeding   transfuse to keep plt above 50 however not going to repond in view of ITP. plan of care per hematology   high risk for prednisone, hematology eval appreciated, plan for IVIG, premed per recs   monitor plt level  improved plt and hgb level, cont ot monitor     Problem/Recommendation - 4:  ·  Problem: Anemia.  Recommendation: UGIB with ITP  Transfuse to keep hgb above 7  GI and hematology eval appreciated  IV hydration   active T&S.    Problem/Recommendation - 5:  ·  Problem: Prophylactic measure.  Recommendation: DVT and gI PPX       Differential diagnosis and plan of care discussed with patient after the evaluation.   Advanced care planning/advanced directives discussed with patient/family. DNR status including forceful chest compressions to attempt to restart the heart, ventilator support/artificial breathing, electric shock, artificial nutrition   OMT on six regions for acute somatic dysfunctions done at the bedside   Importance of Fall prevention discussed.

## 2021-04-18 ENCOUNTER — TRANSCRIPTION ENCOUNTER (OUTPATIENT)
Age: 56
End: 2021-04-18

## 2021-04-18 LAB
ANION GAP SERPL CALC-SCNC: 11 MMOL/L — SIGNIFICANT CHANGE UP (ref 7–14)
BUN SERPL-MCNC: 4 MG/DL — LOW (ref 7–23)
CALCIUM SERPL-MCNC: 9 MG/DL — SIGNIFICANT CHANGE UP (ref 8.4–10.5)
CHLORIDE SERPL-SCNC: 101 MMOL/L — SIGNIFICANT CHANGE UP (ref 98–107)
CO2 SERPL-SCNC: 30 MMOL/L — SIGNIFICANT CHANGE UP (ref 22–31)
CREAT SERPL-MCNC: 0.6 MG/DL — SIGNIFICANT CHANGE UP (ref 0.5–1.3)
GLUCOSE SERPL-MCNC: 100 MG/DL — HIGH (ref 70–99)
HCT VFR BLD CALC: 30.4 % — LOW (ref 39–50)
HGB BLD-MCNC: 8.8 G/DL — LOW (ref 13–17)
MAGNESIUM SERPL-MCNC: 1.8 MG/DL — SIGNIFICANT CHANGE UP (ref 1.6–2.6)
MCHC RBC-ENTMCNC: 22.3 PG — LOW (ref 27–34)
MCHC RBC-ENTMCNC: 28.9 GM/DL — LOW (ref 32–36)
MCV RBC AUTO: 77 FL — LOW (ref 80–100)
NRBC # BLD: 3 /100 WBCS — SIGNIFICANT CHANGE UP
NRBC # FLD: 0.16 K/UL — HIGH
PHOSPHATE SERPL-MCNC: 5 MG/DL — HIGH (ref 2.5–4.5)
PLATELET # BLD AUTO: 76 K/UL — LOW (ref 150–400)
POTASSIUM SERPL-MCNC: 3.5 MMOL/L — SIGNIFICANT CHANGE UP (ref 3.5–5.3)
POTASSIUM SERPL-SCNC: 3.5 MMOL/L — SIGNIFICANT CHANGE UP (ref 3.5–5.3)
RBC # BLD: 3.95 M/UL — LOW (ref 4.2–5.8)
RBC # FLD: SIGNIFICANT CHANGE UP (ref 10.3–14.5)
SODIUM SERPL-SCNC: 142 MMOL/L — SIGNIFICANT CHANGE UP (ref 135–145)
WBC # BLD: 4.77 K/UL — SIGNIFICANT CHANGE UP (ref 3.8–10.5)
WBC # FLD AUTO: 4.77 K/UL — SIGNIFICANT CHANGE UP (ref 3.8–10.5)

## 2021-04-18 RX ADMIN — Medication 400 MILLIGRAM(S): at 00:07

## 2021-04-18 RX ADMIN — PANTOPRAZOLE SODIUM 10 MG/HR: 20 TABLET, DELAYED RELEASE ORAL at 22:20

## 2021-04-18 RX ADMIN — SODIUM CHLORIDE 100 MILLILITER(S): 9 INJECTION, SOLUTION INTRAVENOUS at 00:07

## 2021-04-18 RX ADMIN — PANTOPRAZOLE SODIUM 10 MG/HR: 20 TABLET, DELAYED RELEASE ORAL at 00:07

## 2021-04-18 RX ADMIN — SODIUM CHLORIDE 100 MILLILITER(S): 9 INJECTION, SOLUTION INTRAVENOUS at 13:09

## 2021-04-18 RX ADMIN — Medication 3 MILLIGRAM(S): at 00:06

## 2021-04-18 RX ADMIN — LIDOCAINE 1 PATCH: 4 CREAM TOPICAL at 10:26

## 2021-04-18 RX ADMIN — SODIUM CHLORIDE 100 MILLILITER(S): 9 INJECTION, SOLUTION INTRAVENOUS at 22:19

## 2021-04-18 RX ADMIN — Medication 3 MILLIGRAM(S): at 22:51

## 2021-04-18 RX ADMIN — LIDOCAINE 1 PATCH: 4 CREAM TOPICAL at 00:06

## 2021-04-18 RX ADMIN — Medication 1000 MILLIGRAM(S): at 00:37

## 2021-04-18 RX ADMIN — LIDOCAINE 1 PATCH: 4 CREAM TOPICAL at 06:22

## 2021-04-18 RX ADMIN — Medication 1 MILLIGRAM(S): at 13:09

## 2021-04-18 RX ADMIN — IRON SUCROSE 176.67 MILLIGRAM(S): 20 INJECTION, SOLUTION INTRAVENOUS at 22:20

## 2021-04-18 NOTE — PROGRESS NOTE ADULT - ASSESSMENT
patient is a 55 year old male with PMhx of PUD s/p distal gastrectomy with Billroth II reconstruction c/b jejunogastric intussusception (2017), iron deficiency anemia  and ITP who presents with hematemesis and     Problem/Recommendation - 1:  Problem: Hematemesis. Recommendation: acute blood loss anemia   GI eval appreciated  rescucitaiton with PRBC transfusion  S/P EGD   R/O PUD v MWT v erosive esophagitis v erosive gastropathy vs malignancy v unlikely EV/GV without portal HTN.    Problem/Recommendation - 2:  ·  Problem: Intussusception.  Recommendation: Surgery as per surg team  plan for OR after medically optimized  IV hydration  active T&S , transfuse to keep hgb above 7.   OR tomorrow  patient is medically optimized for OR.. moderate risk candidate     Problem/Recommendation - 3:  ·  Problem: Thrombocytopenia.  Recommendation: ITP   with acute GI bleeding   transfuse to keep plt above 50 however not going to repond in view of ITP. plan of care per hematology   high risk for prednisone, hematology eval appreciated, plan for IVIG, premed per recs   monitor plt level  improved plt and hgb level, cont ot monitor     Problem/Recommendation - 4:  ·  Problem: Anemia.  Recommendation: UGIB with ITP  Transfuse to keep hgb above 7  GI and hematology eval appreciated  IV hydration   active T&S.    Problem/Recommendation - 5:  ·  Problem: Prophylactic measure.  Recommendation: DVT and gI PPX       Differential diagnosis and plan of care discussed with patient after the evaluation.   Advanced care planning/advanced directives discussed with patient/family. DNR status including forceful chest compressions to attempt to restart the heart, ventilator support/artificial breathing, electric shock, artificial nutrition   OMT on six regions for acute somatic dysfunctions done at the bedside   Importance of Fall prevention discussed.

## 2021-04-18 NOTE — PROGRESS NOTE ADULT - ASSESSMENT
55M with hx of PUD s/p distal gastrectomy with Billroth II reconstruction, c/b jejunogastric intussusception (2017) s/p EGD, chronic anemia, presents with hematemesis, likely 2/2 upper GI bleed from recurrent peptic ulcer vs marginal ulcer. S/p EGD with GI demonstrating multiple non bleeding ulcer, largest 3cm diameter.     PLAN:   - OR planning for early next week  - NPO/IVF  - trend labs. AM CBC improved 7.3/24.5  - transfuse pRBC, platelet PRN  - appreciate Heme recs, IVIG initiated  - serial CBC's   - Protonix BID  - given recurrent jejunogastric intussusception, will benefit from surgical repair after acute anemia resolves     A Team Surgery  [id number]

## 2021-04-18 NOTE — PROGRESS NOTE ADULT - SUBJECTIVE AND OBJECTIVE BOX
Name of Patient : BLANCA PUCKETT  MRN: [id number]  DATE OF SERVICE: 04-18-21     Subjective: Patient seen and examined. No new events except as noted.   doing okay  plan for OR tomorrow     REVIEW OF SYSTEMS:    CONSTITUTIONAL: No weakness, fevers or chills  EYES/ENT: No visual changes;  No vertigo or throat pain   NECK: No pain or stiffness  RESPIRATORY: No cough, wheezing, hemoptysis; No shortness of breath  CARDIOVASCULAR: No chest pain or palpitations  GASTROINTESTINAL: No abdominal or epigastric pain.   GENITOURINARY: No dysuria, frequency or hematuria  NEUROLOGICAL: No numbness or weakness  SKIN: No itching, burning, rashes, or lesions   All other review of systems is negative unless indicated above.    MEDICATIONS:  MEDICATIONS  (STANDING):  folic acid 1 milliGRAM(s) Oral daily  lactated ringers. 1000 milliLiter(s) (100 mL/Hr) IV Continuous <Continuous>  melatonin 3 milliGRAM(s) Oral at bedtime  pantoprazole Infusion 8 mG/Hr (10 mL/Hr) IV Continuous <Continuous>      PHYSICAL EXAM:  T(C): 36.8 (04-18-21 @ 22:02), Max: 36.8 (04-18-21 @ 02:14)  HR: 87 (04-18-21 @ 22:02) (60 - 87)  BP: 132/87 (04-18-21 @ 22:02) (132/87 - 168/82)  RR: 17 (04-18-21 @ 22:02) (17 - 20)  SpO2: 99% (04-18-21 @ 22:02) (99% - 100%)  Wt(kg): --  I&O's Summary    17 Apr 2021 07:01  -  18 Apr 2021 07:00  --------------------------------------------------------  IN: 3060 mL / OUT: 4000 mL / NET: -940 mL    18 Apr 2021 07:01  -  18 Apr 2021 22:59  --------------------------------------------------------  IN: 0 mL / OUT: 2000 mL / NET: -2000 mL          Appearance: Normal	  HEENT:  PERRLA   Lymphatic: No lymphadenopathy   Cardiovascular: Normal S1 S2, no JVD  Respiratory: normal effort , clear  Gastrointestinal:  Soft, Non-tender  Skin: No rashes,  warm to touch  Psychiatry:  Mood & affect appropriate  Musculuskeletal: No edema      All labs, Imaging and EKGs personally reviewed         04-17-21 @ 07:01  -  04-18-21 @ 07:00  --------------------------------------------------------  IN: 3060 mL / OUT: 4000 mL / NET: -940 mL    04-18-21 @ 07:01  -  04-18-21 @ 22:59  --------------------------------------------------------  IN: 0 mL / OUT: 2000 mL / NET: -2000 mL                            8.8    4.77  )-----------( 76       ( 18 Apr 2021 07:52 )             30.4               04-18    142  |  101  |  4<L>  ----------------------------<  100<H>  3.5   |  30  |  0.60    Ca    9.0      18 Apr 2021 07:52  Phos  5.0     04-18  Mg     1.8     04-18    TPro  7.7  /  Alb  3.5  /  TBili  1.0  /  DBili  x   /  AST  38  /  ALT  19  /  AlkPhos  80  04-17

## 2021-04-19 ENCOUNTER — RESULT REVIEW (OUTPATIENT)
Age: 56
End: 2021-04-19

## 2021-04-19 LAB
ANION GAP SERPL CALC-SCNC: 11 MMOL/L — SIGNIFICANT CHANGE UP (ref 7–14)
ANION GAP SERPL CALC-SCNC: 13 MMOL/L — SIGNIFICANT CHANGE UP (ref 7–14)
APTT BLD: 40.2 SEC — HIGH (ref 27–36.3)
BLD GP AB SCN SERPL QL: POSITIVE — SIGNIFICANT CHANGE UP
BUN SERPL-MCNC: 5 MG/DL — LOW (ref 7–23)
BUN SERPL-MCNC: 6 MG/DL — LOW (ref 7–23)
CALCIUM SERPL-MCNC: 8.5 MG/DL — SIGNIFICANT CHANGE UP (ref 8.4–10.5)
CALCIUM SERPL-MCNC: 8.7 MG/DL — SIGNIFICANT CHANGE UP (ref 8.4–10.5)
CHLORIDE SERPL-SCNC: 100 MMOL/L — SIGNIFICANT CHANGE UP (ref 98–107)
CHLORIDE SERPL-SCNC: 101 MMOL/L — SIGNIFICANT CHANGE UP (ref 98–107)
CO2 SERPL-SCNC: 27 MMOL/L — SIGNIFICANT CHANGE UP (ref 22–31)
CO2 SERPL-SCNC: 27 MMOL/L — SIGNIFICANT CHANGE UP (ref 22–31)
CREAT SERPL-MCNC: 0.58 MG/DL — SIGNIFICANT CHANGE UP (ref 0.5–1.3)
CREAT SERPL-MCNC: 0.6 MG/DL — SIGNIFICANT CHANGE UP (ref 0.5–1.3)
GLUCOSE SERPL-MCNC: 120 MG/DL — HIGH (ref 70–99)
GLUCOSE SERPL-MCNC: 77 MG/DL — SIGNIFICANT CHANGE UP (ref 70–99)
HCT VFR BLD CALC: 30.6 % — LOW (ref 39–50)
HGB BLD-MCNC: 8.8 G/DL — LOW (ref 13–17)
INR BLD: 1.25 RATIO — HIGH (ref 0.88–1.16)
MAGNESIUM SERPL-MCNC: 1.7 MG/DL — SIGNIFICANT CHANGE UP (ref 1.6–2.6)
MAGNESIUM SERPL-MCNC: 1.8 MG/DL — SIGNIFICANT CHANGE UP (ref 1.6–2.6)
MCHC RBC-ENTMCNC: 22.1 PG — LOW (ref 27–34)
MCHC RBC-ENTMCNC: 28.8 GM/DL — LOW (ref 32–36)
MCV RBC AUTO: 76.9 FL — LOW (ref 80–100)
NRBC # BLD: 0 /100 WBCS — SIGNIFICANT CHANGE UP
NRBC # FLD: 0.04 K/UL — HIGH
PHOSPHATE SERPL-MCNC: 4.6 MG/DL — HIGH (ref 2.5–4.5)
PHOSPHATE SERPL-MCNC: 5 MG/DL — HIGH (ref 2.5–4.5)
PLATELET # BLD AUTO: 90 K/UL — LOW (ref 150–400)
POTASSIUM SERPL-MCNC: 3.1 MMOL/L — LOW (ref 3.5–5.3)
POTASSIUM SERPL-MCNC: 3.4 MMOL/L — LOW (ref 3.5–5.3)
POTASSIUM SERPL-SCNC: 3.1 MMOL/L — LOW (ref 3.5–5.3)
POTASSIUM SERPL-SCNC: 3.4 MMOL/L — LOW (ref 3.5–5.3)
PROTHROM AB SERPL-ACNC: 14.2 SEC — HIGH (ref 10.6–13.6)
RBC # BLD: 3.98 M/UL — LOW (ref 4.2–5.8)
RBC # FLD: SIGNIFICANT CHANGE UP (ref 10.3–14.5)
RH IG SCN BLD-IMP: POSITIVE — SIGNIFICANT CHANGE UP
SARS-COV-2 RNA SPEC QL NAA+PROBE: SIGNIFICANT CHANGE UP
SODIUM SERPL-SCNC: 138 MMOL/L — SIGNIFICANT CHANGE UP (ref 135–145)
SODIUM SERPL-SCNC: 141 MMOL/L — SIGNIFICANT CHANGE UP (ref 135–145)
WBC # BLD: 4.3 K/UL — SIGNIFICANT CHANGE UP (ref 3.8–10.5)
WBC # FLD AUTO: 4.3 K/UL — SIGNIFICANT CHANGE UP (ref 3.8–10.5)

## 2021-04-19 PROCEDURE — 88189 FLOWCYTOMETRY/READ 16 & >: CPT

## 2021-04-19 PROCEDURE — 86077 PHYS BLOOD BANK SERV XMATCH: CPT

## 2021-04-19 PROCEDURE — 88342 IMHCHEM/IMCYTCHM 1ST ANTB: CPT | Mod: 26,59

## 2021-04-19 PROCEDURE — 88341 IMHCHEM/IMCYTCHM EA ADD ANTB: CPT | Mod: 26,59

## 2021-04-19 PROCEDURE — 88360 TUMOR IMMUNOHISTOCHEM/MANUAL: CPT | Mod: 26

## 2021-04-19 PROCEDURE — 88307 TISSUE EXAM BY PATHOLOGIST: CPT | Mod: 26

## 2021-04-19 RX ORDER — HYDROMORPHONE HYDROCHLORIDE 2 MG/ML
1 INJECTION INTRAMUSCULAR; INTRAVENOUS; SUBCUTANEOUS
Refills: 0 | Status: DISCONTINUED | OUTPATIENT
Start: 2021-04-19 | End: 2021-04-20

## 2021-04-19 RX ORDER — PANTOPRAZOLE SODIUM 20 MG/1
40 TABLET, DELAYED RELEASE ORAL DAILY
Refills: 0 | Status: DISCONTINUED | OUTPATIENT
Start: 2021-04-19 | End: 2021-04-21

## 2021-04-19 RX ORDER — ENOXAPARIN SODIUM 100 MG/ML
40 INJECTION SUBCUTANEOUS DAILY
Refills: 0 | Status: DISCONTINUED | OUTPATIENT
Start: 2021-04-19 | End: 2021-04-24

## 2021-04-19 RX ORDER — MAGNESIUM SULFATE 500 MG/ML
2 VIAL (ML) INJECTION ONCE
Refills: 0 | Status: COMPLETED | OUTPATIENT
Start: 2021-04-19 | End: 2021-04-19

## 2021-04-19 RX ORDER — NALOXONE HYDROCHLORIDE 4 MG/.1ML
0.1 SPRAY NASAL
Refills: 0 | Status: DISCONTINUED | OUTPATIENT
Start: 2021-04-19 | End: 2021-04-24

## 2021-04-19 RX ORDER — POTASSIUM CHLORIDE 20 MEQ
10 PACKET (EA) ORAL
Refills: 0 | Status: COMPLETED | OUTPATIENT
Start: 2021-04-19 | End: 2021-04-19

## 2021-04-19 RX ORDER — ACETAMINOPHEN 500 MG
1000 TABLET ORAL ONCE
Refills: 0 | Status: COMPLETED | OUTPATIENT
Start: 2021-04-20 | End: 2021-04-20

## 2021-04-19 RX ORDER — HYDROMORPHONE HYDROCHLORIDE 2 MG/ML
0.5 INJECTION INTRAMUSCULAR; INTRAVENOUS; SUBCUTANEOUS
Refills: 0 | Status: DISCONTINUED | OUTPATIENT
Start: 2021-04-19 | End: 2021-04-20

## 2021-04-19 RX ORDER — POTASSIUM CHLORIDE 20 MEQ
10 PACKET (EA) ORAL
Refills: 0 | Status: COMPLETED | OUTPATIENT
Start: 2021-04-19 | End: 2021-04-20

## 2021-04-19 RX ORDER — ACETAMINOPHEN 500 MG
1000 TABLET ORAL ONCE
Refills: 0 | Status: COMPLETED | OUTPATIENT
Start: 2021-04-19 | End: 2021-04-19

## 2021-04-19 RX ORDER — HYDROMORPHONE HYDROCHLORIDE 2 MG/ML
30 INJECTION INTRAMUSCULAR; INTRAVENOUS; SUBCUTANEOUS
Refills: 0 | Status: DISCONTINUED | OUTPATIENT
Start: 2021-04-19 | End: 2021-04-20

## 2021-04-19 RX ORDER — ONDANSETRON 8 MG/1
4 TABLET, FILM COATED ORAL EVERY 6 HOURS
Refills: 0 | Status: DISCONTINUED | OUTPATIENT
Start: 2021-04-19 | End: 2021-04-24

## 2021-04-19 RX ADMIN — Medication 1 MILLIGRAM(S): at 13:00

## 2021-04-19 RX ADMIN — Medication 50 GRAM(S): at 13:01

## 2021-04-19 RX ADMIN — SODIUM CHLORIDE 100 MILLILITER(S): 9 INJECTION, SOLUTION INTRAVENOUS at 18:27

## 2021-04-19 RX ADMIN — HYDROMORPHONE HYDROCHLORIDE 0.5 MILLIGRAM(S): 2 INJECTION INTRAMUSCULAR; INTRAVENOUS; SUBCUTANEOUS at 18:45

## 2021-04-19 RX ADMIN — HYDROMORPHONE HYDROCHLORIDE 30 MILLILITER(S): 2 INJECTION INTRAMUSCULAR; INTRAVENOUS; SUBCUTANEOUS at 19:02

## 2021-04-19 RX ADMIN — HYDROMORPHONE HYDROCHLORIDE 30 MILLILITER(S): 2 INJECTION INTRAMUSCULAR; INTRAVENOUS; SUBCUTANEOUS at 23:53

## 2021-04-19 RX ADMIN — Medication 100 MILLIEQUIVALENT(S): at 13:02

## 2021-04-19 RX ADMIN — HYDROMORPHONE HYDROCHLORIDE 0.5 MILLIGRAM(S): 2 INJECTION INTRAMUSCULAR; INTRAVENOUS; SUBCUTANEOUS at 18:30

## 2021-04-19 RX ADMIN — HYDROMORPHONE HYDROCHLORIDE 0.5 MILLIGRAM(S): 2 INJECTION INTRAMUSCULAR; INTRAVENOUS; SUBCUTANEOUS at 18:15

## 2021-04-19 RX ADMIN — Medication 100 MILLIEQUIVALENT(S): at 21:46

## 2021-04-19 NOTE — CHART NOTE - NSCHARTNOTEFT_GEN_A_CORE
Post Operative Note  Patient: BLANCA PUCKETT 55y (1965) Male   MRN: [id number]  Location: [place] [address]  Visit: 04-13-21 Inpatient  Date: 04-19-21 @ 22:26    Procedure: S/P ex lap, mesenteric lymph node biopsies, GJ revision    Subjective: Patient reports pain that is relieved by PCA. Denies N/V, fevers, chills. Complains of dry mouth that is a chronic issue. He has voided twice in PACU. He has taken sips of water in PACU.      Objective:  Vitals: T(F): 97.9 (04-19-21 @ 19:00), Max: 98.6 (04-19-21 @ 12:52)  HR: 75 (04-19-21 @ 22:00)  BP: 149/70 (04-19-21 @ 22:00) (124/83 - 178/96)  RR: 25 (04-19-21 @ 22:00)  SpO2: 97% (04-19-21 @ 22:00)  Vent Settings:     In:   04-18-21 @ 07:01  -  04-19-21 @ 07:00  --------------------------------------------------------  IN: 1470 mL    04-19-21 @ 07:01  -  04-19-21 @ 22:26  --------------------------------------------------------  IN: 890 mL      IV Fluids: folic acid 1 milliGRAM(s) Oral daily  lactated ringers. 1000 milliLiter(s) (100 mL/Hr) IV Continuous <Continuous>  potassium chloride  10 mEq/100 mL IVPB 10 milliEquivalent(s) IV Intermittent every 1 hour      Out:   04-18-21 @ 07:01  -  04-19-21 @ 07:00  --------------------------------------------------------  OUT: 3100 mL    04-19-21 @ 07:01  -  04-19-21 @ 22:26  --------------------------------------------------------  OUT: 1950 mL      EBL:     Voided Urine:   04-18-21 @ 07:01  -  04-19-21 @ 07:00  --------------------------------------------------------  OUT: 3100 mL    04-19-21 @ 07:01  -  04-19-21 @ 22:26  --------------------------------------------------------  OUT: 1950 mL      Harley Catheter: no           Physical Examination:  General: NAD, resting comfortably in bed  HEENT: Normocephalic atraumatic  Respiratory: Nonlabored respirations, normal CW expansion.  Cardio: S1S2, regular rate and rhythm.  Abdomen: softly distended, appropriately tender, surgical incisions are c/d/i.   Vascular: extremities are warm and well perfused.     Imaging:  No post-op imaging studies    Assessment:  55yMale patient several hours s/p ex lap, mesenteric lymph node biopsies, GJ revision, recovering well in PACU.    Plan:  - Pain control PRN, PCA  - Diet: CLD, LR  - Potassium repletion  - Activity: OOBAT  - DVT ppx: Lovenox  - Dispo: Floor    A team surgery [id number]      Date/Time: 04-19-21 @ 22:26

## 2021-04-19 NOTE — CHART NOTE - NSCHARTNOTEFT_GEN_A_CORE
HEMATOLOGY FELLOW NOTE    Called by Surgery regarding the possible need for splenectomy. Patient has a known history of ITP and has responded well to IVIG thus far (steroids deferred given ongoing GIB). Additionally, patient has not received second line treatment with Rituxan, should he need it. At present, there is no indication for a splenectomy.       Please page or call with questions.    Rossy Ni MD  Hematology/Oncology Fellow, PGY-4  Pager: [phone]  After 5pm and on weekends please page on-call fellow

## 2021-04-19 NOTE — PROGRESS NOTE ADULT - ASSESSMENT
Assessment and Plan:  55M with hx of PUD s/p distal gastrectomy with Billroth II reconstruction, c/b jejunogastric intussusception (2017) s/p EGD, chronic anemia, presents with hematemesis, likely 2/2 upper GI bleed from recurrent peptic ulcer vs marginal ulcer. S/p EGD with GI demonstrating multiple non bleeding ulcer, largest 3cm diameter.     PLAN:   - OR today, consent in chart  - NPO/IVF  - trend labs AM. CBC improved   - transfuse pRBC, platelet PRN  - appreciate Heme recs  - Protonix BID      A team surgery [id number]

## 2021-04-19 NOTE — PRE-OP CHECKLIST - SELECT TESTS ORDERED
BMP/CBC/Type and Screen/COVID-19
BMP/CBC/CMP/Type and Screen
BMP/CBC/PT/PTT/INR/Type and Screen/EKG/COVID-19

## 2021-04-19 NOTE — PRE-OP CHECKLIST - HEIGHT IN FEET
What Type Of Note Output Would You Prefer (Optional)?: Standard Output
How Severe Are Your Spot(S)?: mild
Have Your Spot(S) Been Treated In The Past?: has not been treated
Hpi Title: Evaluation of Skin Lesions
6

## 2021-04-19 NOTE — PROGRESS NOTE ADULT - ASSESSMENT
patient is a 55 year old male with PMhx of PUD s/p distal gastrectomy with Billroth II reconstruction c/b jejunogastric intussusception (2017), iron deficiency anemia  and ITP who presents with hematemesis and     Problem/Recommendation - 1:  Problem: Hematemesis. Recommendation: acute blood loss anemia   GI eval appreciated  rescucitaiton with PRBC transfusion  S/P EGD   R/O PUD v MWT v erosive esophagitis v erosive gastropathy vs malignancy v unlikely EV/GV without portal HTN.    Problem/Recommendation - 2:  ·  Problem: Intussusception.  Recommendation: Surgery as per surg team  plan for OR after medically optimized  IV hydration  active T&S , transfuse to keep hgb above 7.   OR tomorrow  patient is medically optimized for OR.. moderate risk candidate , plan for today     Problem/Recommendation - 3:  ·  Problem: Thrombocytopenia.  Recommendation: ITP   improved   with acute GI bleeding , stable now    plan of care per hematology   high risk for prednisone, hematology eval appreciated, plan for IVIG, premed per recs   monitor plt level  improved plt and hgb level, cont ot monitor     Problem/Recommendation - 4:  ·  Problem: Anemia.  Recommendation: UGIB with ITP  Transfuse to keep hgb above 7  GI and hematology eval appreciated  IV hydration   active T&S.    Problem/Recommendation - 5:  ·  Problem: Prophylactic measure.  Recommendation: DVT and gI PPX

## 2021-04-19 NOTE — PROGRESS NOTE ADULT - SUBJECTIVE AND OBJECTIVE BOX
Name of Patient : BLANCA PUCKETT  MRN: [id number]  DATE OF SERVICE: 04-19-21     Subjective: Patient seen and examined. No new events except as noted.   doing okay   Hgb and plt stable     REVIEW OF SYSTEMS:    CONSTITUTIONAL: No weakness, fevers or chills  EYES/ENT: No visual changes;  No vertigo or throat pain   NECK: No pain or stiffness  RESPIRATORY: No cough, wheezing, hemoptysis; No shortness of breath  CARDIOVASCULAR: No chest pain or palpitations  GASTROINTESTINAL: No abdominal or epigastric pain. No nausea, vomiting, or hematemesis; No diarrhea or constipation. No melena or hematochezia.  GENITOURINARY: No dysuria, frequency or hematuria  NEUROLOGICAL: No numbness or weakness  SKIN: No itching, burning, rashes, or lesions   All other review of systems is negative unless indicated above.    MEDICATIONS:  MEDICATIONS  (STANDING):  folic acid 1 milliGRAM(s) Oral daily  lactated ringers. 1000 milliLiter(s) (100 mL/Hr) IV Continuous <Continuous>  melatonin 3 milliGRAM(s) Oral at bedtime  pantoprazole Infusion 8 mG/Hr (10 mL/Hr) IV Continuous <Continuous>  potassium chloride  10 mEq/100 mL IVPB 10 milliEquivalent(s) IV Intermittent every 1 hour      PHYSICAL EXAM:  T(C): 36.8 (04-19-21 @ 13:32), Max: 37 (04-19-21 @ 12:52)  HR: 60 (04-19-21 @ 13:32) (57 - 87)  BP: 159/90 (04-19-21 @ 13:32) (124/83 - 178/96)  RR: 18 (04-19-21 @ 13:32) (16 - 19)  SpO2: 100% (04-19-21 @ 13:32) (99% - 100%)  Wt(kg): --  I&O's Summary    18 Apr 2021 07:01  -  19 Apr 2021 07:00  --------------------------------------------------------  IN: 1470 mL / OUT: 3100 mL / NET: -1630 mL    19 Apr 2021 07:01  -  19 Apr 2021 17:05  --------------------------------------------------------  IN: 590 mL / OUT: 1350 mL / NET: -760 mL      Height (cm): 190.5 (04-19 @ 13:09)  Weight (kg): 75 (04-19 @ 13:09)  BMI (kg/m2): 20.7 (04-19 @ 13:09)  BSA (m2): 2.02 (04-19 @ 13:09)    Appearance: Normal	  HEENT:  PERRLA   Lymphatic: No lymphadenopathy   Cardiovascular: Normal S1 S2, no JVD  Respiratory: normal effort , clear  Gastrointestinal:  Soft, Non-tender  Skin: No rashes,  warm to touch  Psychiatry:  Mood & affect appropriate  Musculuskeletal: No edema      All labs, Imaging and EKGs personally reviewed           04-18-21 @ 07:01  -  04-19-21 @ 07:00  --------------------------------------------------------  IN: 1470 mL / OUT: 3100 mL / NET: -1630 mL    04-19-21 @ 07:01  -  04-19-21 @ 17:05  --------------------------------------------------------  IN: 590 mL / OUT: 1350 mL / NET: -760 mL                          8.8    4.30  )-----------( 90       ( 19 Apr 2021 09:03 )             30.6               04-19    141  |  101  |  5<L>  ----------------------------<  77  3.1<L>   |  27  |  0.60    Ca    8.7      19 Apr 2021 09:03  Phos  5.0     04-19  Mg     1.7     04-19      PT/INR - ( 19 Apr 2021 09:03 )   PT: 14.2 sec;   INR: 1.25 ratio         PTT - ( 19 Apr 2021 09:03 )  PTT:40.2 sec

## 2021-04-19 NOTE — PROGRESS NOTE ADULT - SUBJECTIVE AND OBJECTIVE BOX
A Team Surgery Daily Progress Note  =====================================================    SUBJECTIVE: Patient seen and examined at bedside on AM rounds. Patient reports that they're feeling well. Tolerating diet/NPO for OR now, denies nausea, vomiting. OOB/Ambulating as tolerated. Denies fever, chills. Denies hemetemesis or hematochezia today.     24 HOUR EVENTS:  Preop and consented for OR  COVID swab sent    MEDICATIONS  (STANDING):  folic acid 1 milliGRAM(s) Oral daily  lactated ringers. 1000 milliLiter(s) (100 mL/Hr) IV Continuous <Continuous>  melatonin 3 milliGRAM(s) Oral at bedtime  pantoprazole Infusion 8 mG/Hr (10 mL/Hr) IV Continuous <Continuous>    MEDICATIONS  (PRN):  lidocaine   Patch 1 Patch Transdermal daily PRN Back pain      OBJECTIVE:    Vital Signs Last 24 Hrs  T(C): 36.8 (18 Apr 2021 22:02), Max: 36.8 (18 Apr 2021 02:14)  T(F): 98.2 (18 Apr 2021 22:02), Max: 98.2 (18 Apr 2021 02:14)  HR: 87 (18 Apr 2021 22:02) (60 - 87)  BP: 132/87 (18 Apr 2021 22:02) (132/87 - 168/82)  BP(mean): --  RR: 17 (18 Apr 2021 22:02) (17 - 20)  SpO2: 99% (18 Apr 2021 22:02) (99% - 100%)    PHYSICAL EXAM:  Gen: NAD, resting in bed, alert and responding appropriately  Resp: Non-labored respirations  Abd: Soft, nontender, nondistended  Ext: Grossly moving all extremities        I&O's Detail    17 Apr 2021 07:01  -  18 Apr 2021 07:00  --------------------------------------------------------  IN:    IV PiggyBack: 400 mL    Lactated Ringers: 2200 mL    Oral Fluid: 240 mL    Pantoprazole: 220 mL  Total IN: 3060 mL    OUT:    Voided (mL): 4000 mL  Total OUT: 4000 mL    Total NET: -940 mL      18 Apr 2021 07:01  -  19 Apr 2021 01:04  --------------------------------------------------------  IN:  Total IN: 0 mL    OUT:    Voided (mL): 2000 mL  Total OUT: 2000 mL    Total NET: -2000 mL          Daily     Daily     LABS:                        8.8    4.77  )-----------( 76       ( 18 Apr 2021 07:52 )             30.4     04-18    142  |  101  |  4<L>  ----------------------------<  100<H>  3.5   |  30  |  0.60    Ca    9.0      18 Apr 2021 07:52  Phos  5.0     04-18  Mg     1.8     04-18    TPro  7.7  /  Alb  3.5  /  TBili  1.0  /  DBili  x   /  AST  38  /  ALT  19  /  AlkPhos  80  04-17

## 2021-04-19 NOTE — BRIEF OPERATIVE NOTE - OPERATION/FINDINGS
Exlap revealed extensive mesenteric lymphadenopathy sent as fresh and permanent specimens. No evidence of carcinomatosis. Liver and all examined surfaces clean without disease.  Antecolic GJ Anastomosis intact, not intussuscepted but with evidence of recent telescoping. Decision made to pexy afferent to efferent limb, as well as efferent limb to anterior abdominal wall in 3 places to prevent future intussusception.

## 2021-04-19 NOTE — BRIEF OPERATIVE NOTE - NSICDXBRIEFPROCEDURE_GEN_ALL_CORE_FT
PROCEDURES:  Enteropexy 19-Apr-2021 18:12:34  [facility]  Biopsy, lymph node, mesenteric 19-Apr-2021 18:13:12  [facility] [place]

## 2021-04-20 LAB
ANION GAP SERPL CALC-SCNC: 10 MMOL/L — SIGNIFICANT CHANGE UP (ref 7–14)
BUN SERPL-MCNC: 6 MG/DL — LOW (ref 7–23)
CALCIUM SERPL-MCNC: 8.6 MG/DL — SIGNIFICANT CHANGE UP (ref 8.4–10.5)
CHLORIDE SERPL-SCNC: 105 MMOL/L — SIGNIFICANT CHANGE UP (ref 98–107)
CO2 SERPL-SCNC: 26 MMOL/L — SIGNIFICANT CHANGE UP (ref 22–31)
CREAT SERPL-MCNC: 0.65 MG/DL — SIGNIFICANT CHANGE UP (ref 0.5–1.3)
GLUCOSE SERPL-MCNC: 89 MG/DL — SIGNIFICANT CHANGE UP (ref 70–99)
HCT VFR BLD CALC: 32.8 % — LOW (ref 39–50)
HGB BLD-MCNC: 9.4 G/DL — LOW (ref 13–17)
MAGNESIUM SERPL-MCNC: 1.9 MG/DL — SIGNIFICANT CHANGE UP (ref 1.6–2.6)
MCHC RBC-ENTMCNC: 22.4 PG — LOW (ref 27–34)
MCHC RBC-ENTMCNC: 28.7 GM/DL — LOW (ref 32–36)
MCV RBC AUTO: 78.1 FL — LOW (ref 80–100)
NRBC # BLD: 0 /100 WBCS — SIGNIFICANT CHANGE UP
NRBC # FLD: 0 K/UL — SIGNIFICANT CHANGE UP
PHOSPHATE SERPL-MCNC: 4.4 MG/DL — SIGNIFICANT CHANGE UP (ref 2.5–4.5)
PLATELET # BLD AUTO: 151 K/UL — SIGNIFICANT CHANGE UP (ref 150–400)
POTASSIUM SERPL-MCNC: 3.8 MMOL/L — SIGNIFICANT CHANGE UP (ref 3.5–5.3)
POTASSIUM SERPL-SCNC: 3.8 MMOL/L — SIGNIFICANT CHANGE UP (ref 3.5–5.3)
RBC # BLD: 4.2 M/UL — SIGNIFICANT CHANGE UP (ref 4.2–5.8)
RBC # FLD: SIGNIFICANT CHANGE UP (ref 10.3–14.5)
SODIUM SERPL-SCNC: 141 MMOL/L — SIGNIFICANT CHANGE UP (ref 135–145)
TM INTERPRETATION: SIGNIFICANT CHANGE UP
WBC # BLD: 7.6 K/UL — SIGNIFICANT CHANGE UP (ref 3.8–10.5)
WBC # FLD AUTO: 7.6 K/UL — SIGNIFICANT CHANGE UP (ref 3.8–10.5)

## 2021-04-20 PROCEDURE — 99232 SBSQ HOSP IP/OBS MODERATE 35: CPT | Mod: GC

## 2021-04-20 RX ORDER — OXYCODONE HYDROCHLORIDE 5 MG/1
5 TABLET ORAL
Refills: 0 | Status: DISCONTINUED | OUTPATIENT
Start: 2021-04-20 | End: 2021-04-20

## 2021-04-20 RX ORDER — POTASSIUM CHLORIDE 20 MEQ
40 PACKET (EA) ORAL ONCE
Refills: 0 | Status: DISCONTINUED | OUTPATIENT
Start: 2021-04-20 | End: 2021-04-20

## 2021-04-20 RX ORDER — HYDROMORPHONE HYDROCHLORIDE 2 MG/ML
1 INJECTION INTRAMUSCULAR; INTRAVENOUS; SUBCUTANEOUS ONCE
Refills: 0 | Status: DISCONTINUED | OUTPATIENT
Start: 2021-04-20 | End: 2021-04-20

## 2021-04-20 RX ORDER — POTASSIUM CHLORIDE 20 MEQ
10 PACKET (EA) ORAL
Refills: 0 | Status: COMPLETED | OUTPATIENT
Start: 2021-04-20 | End: 2021-04-20

## 2021-04-20 RX ORDER — ACETAMINOPHEN 500 MG
650 TABLET ORAL EVERY 6 HOURS
Refills: 0 | Status: DISCONTINUED | OUTPATIENT
Start: 2021-04-20 | End: 2021-04-20

## 2021-04-20 RX ORDER — CYCLOBENZAPRINE HYDROCHLORIDE 10 MG/1
5 TABLET, FILM COATED ORAL ONCE
Refills: 0 | Status: COMPLETED | OUTPATIENT
Start: 2021-04-20 | End: 2021-04-20

## 2021-04-20 RX ORDER — OXYCODONE HYDROCHLORIDE 5 MG/1
10 TABLET ORAL
Refills: 0 | Status: DISCONTINUED | OUTPATIENT
Start: 2021-04-20 | End: 2021-04-24

## 2021-04-20 RX ORDER — ACETAMINOPHEN 500 MG
1000 TABLET ORAL EVERY 6 HOURS
Refills: 0 | Status: DISCONTINUED | OUTPATIENT
Start: 2021-04-20 | End: 2021-04-21

## 2021-04-20 RX ORDER — CYCLOBENZAPRINE HYDROCHLORIDE 10 MG/1
5 TABLET, FILM COATED ORAL EVERY 6 HOURS
Refills: 0 | Status: DISCONTINUED | OUTPATIENT
Start: 2021-04-20 | End: 2021-04-22

## 2021-04-20 RX ORDER — DEXTROSE MONOHYDRATE, SODIUM CHLORIDE, AND POTASSIUM CHLORIDE 50; .745; 4.5 G/1000ML; G/1000ML; G/1000ML
1000 INJECTION, SOLUTION INTRAVENOUS
Refills: 0 | Status: DISCONTINUED | OUTPATIENT
Start: 2021-04-20 | End: 2021-04-20

## 2021-04-20 RX ORDER — MAGNESIUM SULFATE 500 MG/ML
2 VIAL (ML) INJECTION ONCE
Refills: 0 | Status: COMPLETED | OUTPATIENT
Start: 2021-04-20 | End: 2021-04-20

## 2021-04-20 RX ADMIN — OXYCODONE HYDROCHLORIDE 10 MILLIGRAM(S): 5 TABLET ORAL at 18:35

## 2021-04-20 RX ADMIN — ENOXAPARIN SODIUM 40 MILLIGRAM(S): 100 INJECTION SUBCUTANEOUS at 13:57

## 2021-04-20 RX ADMIN — Medication 400 MILLIGRAM(S): at 07:17

## 2021-04-20 RX ADMIN — Medication 1 MILLIGRAM(S): at 13:20

## 2021-04-20 RX ADMIN — OXYCODONE HYDROCHLORIDE 10 MILLIGRAM(S): 5 TABLET ORAL at 21:56

## 2021-04-20 RX ADMIN — Medication 400 MILLIGRAM(S): at 00:10

## 2021-04-20 RX ADMIN — Medication 1000 MILLIGRAM(S): at 12:49

## 2021-04-20 RX ADMIN — DEXTROSE MONOHYDRATE, SODIUM CHLORIDE, AND POTASSIUM CHLORIDE 30 MILLILITER(S): 50; .745; 4.5 INJECTION, SOLUTION INTRAVENOUS at 10:43

## 2021-04-20 RX ADMIN — OXYCODONE HYDROCHLORIDE 10 MILLIGRAM(S): 5 TABLET ORAL at 22:26

## 2021-04-20 RX ADMIN — HYDROMORPHONE HYDROCHLORIDE 30 MILLILITER(S): 2 INJECTION INTRAMUSCULAR; INTRAVENOUS; SUBCUTANEOUS at 08:22

## 2021-04-20 RX ADMIN — Medication 3 MILLIGRAM(S): at 21:56

## 2021-04-20 RX ADMIN — Medication 1000 MILLIGRAM(S): at 00:40

## 2021-04-20 RX ADMIN — SODIUM CHLORIDE 100 MILLILITER(S): 9 INJECTION, SOLUTION INTRAVENOUS at 00:07

## 2021-04-20 RX ADMIN — Medication 3 MILLIGRAM(S): at 00:05

## 2021-04-20 RX ADMIN — OXYCODONE HYDROCHLORIDE 10 MILLIGRAM(S): 5 TABLET ORAL at 12:19

## 2021-04-20 RX ADMIN — Medication 400 MILLIGRAM(S): at 12:19

## 2021-04-20 RX ADMIN — Medication 100 MILLIEQUIVALENT(S): at 00:05

## 2021-04-20 RX ADMIN — Medication 100 MILLIEQUIVALENT(S): at 10:44

## 2021-04-20 RX ADMIN — CYCLOBENZAPRINE HYDROCHLORIDE 5 MILLIGRAM(S): 10 TABLET, FILM COATED ORAL at 13:57

## 2021-04-20 RX ADMIN — PANTOPRAZOLE SODIUM 40 MILLIGRAM(S): 20 TABLET, DELAYED RELEASE ORAL at 13:20

## 2021-04-20 RX ADMIN — Medication 50 GRAM(S): at 10:43

## 2021-04-20 RX ADMIN — CYCLOBENZAPRINE HYDROCHLORIDE 5 MILLIGRAM(S): 10 TABLET, FILM COATED ORAL at 07:17

## 2021-04-20 RX ADMIN — OXYCODONE HYDROCHLORIDE 10 MILLIGRAM(S): 5 TABLET ORAL at 12:49

## 2021-04-20 RX ADMIN — CYCLOBENZAPRINE HYDROCHLORIDE 5 MILLIGRAM(S): 10 TABLET, FILM COATED ORAL at 20:43

## 2021-04-20 RX ADMIN — Medication 400 MILLIGRAM(S): at 19:02

## 2021-04-20 RX ADMIN — Medication 1000 MILLIGRAM(S): at 07:47

## 2021-04-20 RX ADMIN — Medication 100 MILLIEQUIVALENT(S): at 13:57

## 2021-04-20 RX ADMIN — Medication 100 MILLIEQUIVALENT(S): at 00:06

## 2021-04-20 RX ADMIN — HYDROMORPHONE HYDROCHLORIDE 1 MILLIGRAM(S): 2 INJECTION INTRAMUSCULAR; INTRAVENOUS; SUBCUTANEOUS at 05:00

## 2021-04-20 RX ADMIN — HYDROMORPHONE HYDROCHLORIDE 1 MILLIGRAM(S): 2 INJECTION INTRAMUSCULAR; INTRAVENOUS; SUBCUTANEOUS at 04:46

## 2021-04-20 RX ADMIN — Medication 100 MILLIEQUIVALENT(S): at 12:18

## 2021-04-20 NOTE — PROGRESS NOTE ADULT - SUBJECTIVE AND OBJECTIVE BOX
Patient is a 55y old  Male who presents with a chief complaint of GI bleed (20 Apr 2021 10:42)    SUBJECTIVE / OVERNIGHT EVENTS:  Chart reviewed and previous events noted. Patient is s/p ex lap, GJ revision, and mesenteric lymph node biopsies on 4/19. No acute events overnight.    MEDICATIONS  (STANDING):  acetaminophen  IVPB .. 1000 milliGRAM(s) IV Intermittent every 6 hours  cyclobenzaprine 5 milliGRAM(s) Oral every 6 hours  enoxaparin Injectable 40 milliGRAM(s) SubCutaneous daily  folic acid 1 milliGRAM(s) Oral daily  melatonin 3 milliGRAM(s) Oral at bedtime  oxyCODONE    IR 10 milliGRAM(s) Oral every 3 hours  pantoprazole  Injectable 40 milliGRAM(s) IV Push daily  potassium chloride  10 mEq/100 mL IVPB 10 milliEquivalent(s) IV Intermittent every 1 hour    MEDICATIONS  (PRN):  lidocaine   Patch 1 Patch Transdermal daily PRN Back pain  naloxone Injectable 0.1 milliGRAM(s) IV Push every 3 minutes PRN For ANY of the following changes in patient status:  A. RR LESS THAN 10 breaths per minute, B. Oxygen saturation LESS THAN 90%, C. Sedation score of 6  ondansetron Injectable 4 milliGRAM(s) IV Push every 6 hours PRN Nausea  oxyCODONE    IR 5 milliGRAM(s) Oral every 3 hours PRN Moderate Pain (4 - 6)        CAPILLARY BLOOD GLUCOSE        I&O's Summary    19 Apr 2021 07:01  -  20 Apr 2021 07:00  --------------------------------------------------------  IN: 2350 mL / OUT: 2600 mL / NET: -250 mL    Vital Signs Last 24 Hrs  T(C): 36.6 (20 Apr 2021 09:48), Max: 37.4 (20 Apr 2021 00:02)  T(F): 97.8 (20 Apr 2021 09:48), Max: 99.3 (20 Apr 2021 00:02)  HR: 61 (20 Apr 2021 09:48) (60 - 79)  BP: 130/89 (20 Apr 2021 09:48) (124/83 - 163/90)  BP(mean): 90 (19 Apr 2021 22:15) (79 - 108)  RR: 18 (20 Apr 2021 09:48) (10 - 25)  SpO2: 100% (20 Apr 2021 09:48) (94% - 100%)    PHYSICAL EXAM:  GENERAL: NAD, well-developed  HEAD:  Atraumatic, Normocephalic  EYES: EOMI, PERRLA, conjunctiva and sclera clear  NECK: Supple, No JVD  CHEST/LUNG: Clear to auscultation bilaterally; No wheeze  HEART: Regular rate and rhythm; No murmurs, rubs, or gallops  ABDOMEN: Soft, Nontender, Nondistended; Bowel sounds present  EXTREMITIES:  2+ Peripheral Pulses, No clubbing, cyanosis, or edema  PSYCH: AAOx3  NEUROLOGY: non-focal  SKIN: No rashes or lesions    LABS:                        9.4    7.60  )-----------( 151      ( 20 Apr 2021 07:13 )             32.8     04-20    141  |  105  |  6<L>  ----------------------------<  89  3.8   |  26  |  0.65    Ca    8.6      20 Apr 2021 07:13  Phos  4.4     04-20  Mg     1.9     04-20      PT/INR - ( 19 Apr 2021 09:03 )   PT: 14.2 sec;   INR: 1.25 ratio         PTT - ( 19 Apr 2021 09:03 )  PTT:40.2 sec    RADIOLOGY & ADDITIONAL TESTS:  Studies reviewed.  Patient is a 55y old  Male who presents with a chief complaint of GI bleed (20 Apr 2021 10:42)    SUBJECTIVE / OVERNIGHT EVENTS:  Chart reviewed and previous events noted. Patient is s/p ex lap, GJ revision, and mesenteric lymph node biopsies on 4/19. No acute events overnight. Patient seen this afternoon. He was sitting comfortably in a chair at time of visit. He reports some soreness near his surgical site which has been well controlled with pain medication. No complaints of chest pain, shortness of breath, or episodes of bleeding. Patient was afebrile overnight.    MEDICATIONS  (STANDING):  acetaminophen  IVPB .. 1000 milliGRAM(s) IV Intermittent every 6 hours  cyclobenzaprine 5 milliGRAM(s) Oral every 6 hours  enoxaparin Injectable 40 milliGRAM(s) SubCutaneous daily  folic acid 1 milliGRAM(s) Oral daily  melatonin 3 milliGRAM(s) Oral at bedtime  oxyCODONE    IR 10 milliGRAM(s) Oral every 3 hours  pantoprazole  Injectable 40 milliGRAM(s) IV Push daily  potassium chloride  10 mEq/100 mL IVPB 10 milliEquivalent(s) IV Intermittent every 1 hour    MEDICATIONS  (PRN):  lidocaine   Patch 1 Patch Transdermal daily PRN Back pain  naloxone Injectable 0.1 milliGRAM(s) IV Push every 3 minutes PRN For ANY of the following changes in patient status:  A. RR LESS THAN 10 breaths per minute, B. Oxygen saturation LESS THAN 90%, C. Sedation score of 6  ondansetron Injectable 4 milliGRAM(s) IV Push every 6 hours PRN Nausea  oxyCODONE    IR 5 milliGRAM(s) Oral every 3 hours PRN Moderate Pain (4 - 6)        CAPILLARY BLOOD GLUCOSE        I&O's Summary    19 Apr 2021 07:01  -  20 Apr 2021 07:00  --------------------------------------------------------  IN: 2350 mL / OUT: 2600 mL / NET: -250 mL    Vital Signs Last 24 Hrs  T(C): 36.6 (20 Apr 2021 09:48), Max: 37.4 (20 Apr 2021 00:02)  T(F): 97.8 (20 Apr 2021 09:48), Max: 99.3 (20 Apr 2021 00:02)  HR: 61 (20 Apr 2021 09:48) (60 - 79)  BP: 130/89 (20 Apr 2021 09:48) (124/83 - 163/90)  BP(mean): 90 (19 Apr 2021 22:15) (79 - 108)  RR: 18 (20 Apr 2021 09:48) (10 - 25)  SpO2: 100% (20 Apr 2021 09:48) (94% - 100%)    PHYSICAL EXAM:  GENERAL: NAD, Sitting in a chair  HEENT: NC/AT, Slightly dry mucous membranes  NECK: Supple  CHEST/LUNG: Respirations nonlabored  HEART: RRR; +S1/S2  ABDOMEN: Nondistended  EXTREMITIES: No LE edema  NEUROLOGY: Awake and alert, Answering questions and following commands appropriately  SKIN: Warm and dry  PSYCH: Calm and cooperative    LABS:                        9.4    7.60  )-----------( 151      ( 20 Apr 2021 07:13 )             32.8     04-20    141  |  105  |  6<L>  ----------------------------<  89  3.8   |  26  |  0.65    Ca    8.6      20 Apr 2021 07:13  Phos  4.4     04-20  Mg     1.9     04-20      PT/INR - ( 19 Apr 2021 09:03 )   PT: 14.2 sec;   INR: 1.25 ratio         PTT - ( 19 Apr 2021 09:03 )  PTT:40.2 sec    RADIOLOGY & ADDITIONAL TESTS:  Studies reviewed.

## 2021-04-20 NOTE — PROGRESS NOTE ADULT - ASSESSMENT
Patient is a 56 y/o M w/ a PMHx of PUD s/p distal gastrectomy with Billroth II reconstruction c/b jejunogastric intussusception (2017), iron deficiency anemia, and ITP, who presented with hematemesis, found to have anemia likely 2/2 UGIB from recurrent peptic ulcer vs marginal ulcer in the setting of significant thrombocytopenia, s/p ex lap, GJ revision, and mesenteric lymph node biopsies on 4/19. Hematology was consulted for further evaluation.    # Microcytic anemia  - Patient likely had a very low baseline as he was compensated and asymptomatic on presentation with a Hgb of 4.4.  - Dating back to 2017 when iron studies showed Ferritin of 6  - likely 2/2 ongoing blood loss and possible malabsorption   - Iron studies were c/w iron deficiency (Ferritin = 15 after transfused blood). He was given IV Iron sucrose 300mg x5 doses (4/14-4/18).    - Peripheral smear previously reviewed: Target cells, reticulocytes, pencil cells, no schistocytes. 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 Recommend checking an RBC Osmotic fragility test.   - Patient has received 5U pRBCs this hospitalization (last on 4/14). His Hgb has remained stable since then.  - Continue to monitor CBC and keep active T+S. 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 It is unclear if he has ever been on any treatment for this  - S/p 5U PLTs this hospitalization (last on 4/16)  - Treatment for ITP is typically pulse steroids initially; however, this was held due to concern for a GIB. Instead, he was given IVIG 1g/kg x2 days (4/14-4/15).  - PLT count has been gradually improving and it is WNL this AM (151)  - Continue to monitor CBC    # SERNA  - CT A+P (4/13): Status post Bilroth II with jejunogastric intussusception at the level of the gastrojejunostomy. Fluid distention of the stomach and esophagus, consistent with gastric outlet obstruction  - Appreciate care as per surgery. Patient is s/p ex lap, GJ revision, and mesenteric lymph node biopsies on 4/19  - Follow-up pathology results  - Diet as per Surgery    Delfino Chavarria, PGY5  Hematology-Oncology Fellow  Pager: [phone] / [phone] Patient is a 54 y/o M w/ a PMHx of PUD s/p distal gastrectomy with Billroth II reconstruction c/b jejunogastric intussusception (2017), iron deficiency anemia, and ITP, who presented with hematemesis, found to have anemia likely 2/2 UGIB from recurrent peptic ulcer vs marginal ulcer in the setting of significant thrombocytopenia, s/p ex lap, GJ revision, and mesenteric lymph node biopsies on 4/19. Hematology was consulted for further evaluation.    # Microcytic anemia  - Patient likely had a very low baseline as he was compensated and asymptomatic on presentation with a Hgb of 4.4.  - Dating back to 2017 when iron studies showed Ferritin of 6  - Likely 2/2 ongoing blood loss and possible malabsorption   - Iron studies were c/w iron deficiency (Ferritin = 15 after transfused blood). He was given IV Iron sucrose 300mg x5 doses (4/14-4/18).    - Peripheral smear previously reviewed: Target cells, reticulocytes, pencil cells, no schistocytes  - Patient has received 5U pRBCs this hospitalization (last on 4/14). His Hgb has remained stable since then.  - Continue to monitor CBC and keep active T+S. 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 It is unclear if he has ever been on any treatment for this  - S/p 5U PLTs this hospitalization (last on 4/16)  - Treatment for ITP is typically pulse steroids initially; however, this was held due to concern for a GIB. Instead, he was given IVIG 1g/kg x2 days (4/14-4/15).  - PLT count has been gradually improving and it is WNL this AM (151). Patient will likely need some form of long-term management plan for his ITP such as steroids or Rituximab. Would need to confirm with GI if patient can receive maintenance steroids given his recent GIB and PUD.   - Continue to monitor CBC    # SERNA  - CT A+P (4/13): Status post Bilroth II with jejunogastric intussusception at the level of the gastrojejunostomy. Fluid distention of the stomach and esophagus, consistent with gastric outlet obstruction  - Appreciate care as per surgery. Patient is s/p ex lap, GJ revision, and mesenteric lymph node biopsies on 4/19  - Follow-up pathology results  - Diet as per Surgery    Delfino Chavarria, PGY5  Hematology-Oncology Fellow  Pager: [phone] / [phone]

## 2021-04-20 NOTE — PROVIDER CONTACT NOTE (OTHER) - BACKGROUND
55 yr M with GI bleed, hematemesis,
56 y/o M s/p Exploratory Lap reduction of intusseption.
55 yr M with GI bleed, hematemesis,

## 2021-04-20 NOTE — PROVIDER CONTACT NOTE (OTHER) - ACTION/TREATMENT ORDERED:
MD informed, okay to proceed with infusion
MD informed, okay to proceed with infusion, IV tylenol to be given now
Md ordered 1mg Dilaudid, and will round on pt with team in am, will also look into muscle relaxer.

## 2021-04-20 NOTE — PROGRESS NOTE ADULT - ASSESSMENT
Assessment and Plan:  55yMale patient w/ hx of PUD s/p distal gastrectomy with Billroth II reconstruction, c/b jejunogastric intussusception (2017) s/p EGD, chronic anemia, presents with hematemesis, likely 2/2 upper GI bleed from recurrent peptic ulcer vs marginal ulcer. S/p EGD with GI demonstrating multiple non bleeding ulcer, largest 3cm diameter. Now POD1 s/p ex lap, mesenteric lymph node biopsies, GJ revision, recovering well on floor.    Plan:  - Pain control PRN, PCA  - Diet: CLD, LR  - Potassium repletion  - Activity: OOBAT  - DVT ppx: Lovenox  - Dispo: Floor      A team surgery [id number]

## 2021-04-20 NOTE — PROVIDER CONTACT NOTE (OTHER) - ASSESSMENT
A&Ox4, denies pain, all other VS stable
Vs stable, however is hypertensive, no other complaints,
A&Ox4, denies pain, all other VS stable

## 2021-04-20 NOTE — PROGRESS NOTE ADULT - SUBJECTIVE AND OBJECTIVE BOX
Name of Patient : BLANCA PUCKETT  MRN: [id number]  DATE OF SERVICE: 04-20-21       Subjective: Patient seen and examined. No new events except as noted.   doing okay  abdominal pain   pain contorl     REVIEW OF SYSTEMS:    CONSTITUTIONAL: No weakness, fevers or chills  EYES/ENT: No visual changes;  No vertigo or throat pain   NECK: No pain or stiffness  RESPIRATORY: No cough, wheezing, hemoptysis; No shortness of breath  CARDIOVASCULAR: No chest pain or palpitations  GASTROINTESTINAL: BAbdominal pain   GENITOURINARY: No dysuria, frequency or hematuria  NEUROLOGICAL: No numbness or weakness  SKIN: No itching, burning, rashes, or lesions   All other review of systems is negative unless indicated above.    MEDICATIONS:  MEDICATIONS  (STANDING):  acetaminophen  IVPB .. 1000 milliGRAM(s) IV Intermittent every 6 hours  cyclobenzaprine 5 milliGRAM(s) Oral every 6 hours  enoxaparin Injectable 40 milliGRAM(s) SubCutaneous daily  folic acid 1 milliGRAM(s) Oral daily  melatonin 3 milliGRAM(s) Oral at bedtime  oxyCODONE    IR 10 milliGRAM(s) Oral every 3 hours  pantoprazole  Injectable 40 milliGRAM(s) IV Push daily      PHYSICAL EXAM:  T(C): 36.6 (04-20-21 @ 17:55), Max: 37.4 (04-20-21 @ 00:02)  HR: 68 (04-20-21 @ 17:55) (61 - 78)  BP: 140/90 (04-20-21 @ 17:55) (123/86 - 149/70)  RR: 18 (04-20-21 @ 17:55) (13 - 25)  SpO2: 100% (04-20-21 @ 17:55) (95% - 100%)  Wt(kg): --  I&O's Summary    19 Apr 2021 07:01  -  20 Apr 2021 07:00  --------------------------------------------------------  IN: 2350 mL / OUT: 2600 mL / NET: -250 mL    20 Apr 2021 07:01  -  20 Apr 2021 21:31  --------------------------------------------------------  IN: 425 mL / OUT: 975 mL / NET: -550 mL          Appearance: Normal	  HEENT:  PERRLA   Lymphatic: No lymphadenopathy   Cardiovascular: Normal S1 S2, no JVD  Respiratory: normal effort , clear  Gastrointestinal:  Soft, Non-tender  Skin: No rashes,  warm to touch  Psychiatry:  Mood & affect appropriate  Musculuskeletal: No edema      All labs, Imaging and EKGs personally reviewed       04-19-21 @ 07:01  -  04-20-21 @ 07:00  --------------------------------------------------------  IN: 2350 mL / OUT: 2600 mL / NET: -250 mL    04-20-21 @ 07:01  -  04-20-21 @ 21:31  --------------------------------------------------------  IN: 425 mL / OUT: 975 mL / NET: -550 mL                            9.4    7.60  )-----------( 151      ( 20 Apr 2021 07:13 )             32.8               04-20    141  |  105  |  6<L>  ----------------------------<  89  3.8   |  26  |  0.65    Ca    8.6      20 Apr 2021 07:13  Phos  4.4     04-20  Mg     1.9     04-20      PT/INR - ( 19 Apr 2021 09:03 )   PT: 14.2 sec;   INR: 1.25 ratio         PTT - ( 19 Apr 2021 09:03 )  PTT:40.2 sec

## 2021-04-20 NOTE — PROGRESS NOTE ADULT - SUBJECTIVE AND OBJECTIVE BOX
ANESTHESIA POSTOP CHECK    55y Male POSTOP DAY 1    Vital Signs Last 24 Hrs  T(C): 36.6 (20 Apr 2021 09:48), Max: 37.4 (20 Apr 2021 00:02)  T(F): 97.8 (20 Apr 2021 09:48), Max: 99.3 (20 Apr 2021 00:02)  HR: 61 (20 Apr 2021 09:48) (60 - 79)  BP: 130/89 (20 Apr 2021 09:48) (124/83 - 163/90)  BP(mean): 90 (19 Apr 2021 22:15) (79 - 108)  RR: 18 (20 Apr 2021 09:48) (10 - 25)  SpO2: 100% (20 Apr 2021 09:48) (94% - 100%)  I&O's Summary    19 Apr 2021 07:01  -  20 Apr 2021 07:00  --------------------------------------------------------  IN: 2350 mL / OUT: 2600 mL / NET: -250 mL        [X ] NO APPARENT ANESTHESIA COMPLICATIONS      Comments:

## 2021-04-20 NOTE — PROVIDER CONTACT NOTE (OTHER) - SITUATION
Pt is complaining of abdominal spasms, occuring q1-2mins, intermittently causing 10/10 extreme pain Pt is complaining of abdominal spasms, occurring q1-2mins, intermittently causing 10/10 extreme pain

## 2021-04-20 NOTE — PROGRESS NOTE ADULT - ASSESSMENT
patient is a 55 year old male with PMhx of PUD s/p distal gastrectomy with Billroth II reconstruction c/b jejunogastric intussusception (2017), iron deficiency anemia  and ITP who presents with hematemesis and     Problem/Recommendation - 1:  Problem: Hematemesis. Recommendation: acute blood loss anemia   GI eval appreciated  rescucitaiton with PRBC transfusion  S/P EGD       Problem/Recommendation - 2:  ·  Problem: Intussusception.  Recommendation: Surgery as per surg team  plan for OR after medically optimized  IV hydration  active T&S , transfuse to keep hgb above 7.   S/P OR< tolerated well, pain control      Problem/Recommendation - 3:  ·  Problem: Thrombocytopenia.  Recommendation: ITP   improved   with acute GI bleeding , stable now    plan of care per hematology   high risk for prednisone, hematology eval appreciated, plan for IVIG, premed per recs   monitor plt level  improved plt and hgb level, cont ot monitor     Problem/Recommendation - 4:  ·  Problem: Anemia.  Recommendation: UGIB with ITP  Transfuse to keep hgb above 7  GI and hematology eval appreciated  IV hydration   active T&S.    Problem/Recommendation - 5:  ·  Problem: Prophylactic measure.  Recommendation: DVT and gI PPX

## 2021-04-20 NOTE — PROGRESS NOTE ADULT - SUBJECTIVE AND OBJECTIVE BOX
Anesthesia Pain Management Service    SUBJECTIVE: Patient is doing well with IV PCA and no significant problems reported.    Pain Scale Score	At rest: _4__ 	With Activity: _6__ 	[X ] Refer to charted pain scores    THERAPY:    [ ] IV PCA Morphine		[ ] 5 mg/mL	[ ] 1 mg/mL  [X ] IV PCA Hydromorphone	[ ] 5 mg/mL	[X ] 1 mg/mL  [ ] IV PCA Fentanyl		[ ] 50 micrograms/mL    Demand dose __0.2_ lockout __6_ (minutes) Continuous Rate _0__ Total: _2.2__   mg used (in past 24 hrs)      MEDICATIONS  (STANDING):  acetaminophen  IVPB .. 1000 milliGRAM(s) IV Intermittent every 6 hours  cyclobenzaprine 5 milliGRAM(s) Oral every 6 hours  dextrose 5% + sodium chloride 0.45% with potassium chloride 20 mEq/L 1000 milliLiter(s) (30 mL/Hr) IV Continuous <Continuous>  enoxaparin Injectable 40 milliGRAM(s) SubCutaneous daily  folic acid 1 milliGRAM(s) Oral daily  magnesium sulfate  IVPB 2 Gram(s) IV Intermittent once  melatonin 3 milliGRAM(s) Oral at bedtime  oxyCODONE    IR 10 milliGRAM(s) Oral every 3 hours  pantoprazole  Injectable 40 milliGRAM(s) IV Push daily  potassium chloride    Tablet ER 40 milliEquivalent(s) Oral once  potassium chloride  10 mEq/100 mL IVPB 10 milliEquivalent(s) IV Intermittent every 1 hour    MEDICATIONS  (PRN):  lidocaine   Patch 1 Patch Transdermal daily PRN Back pain  naloxone Injectable 0.1 milliGRAM(s) IV Push every 3 minutes PRN For ANY of the following changes in patient status:  A. RR LESS THAN 10 breaths per minute, B. Oxygen saturation LESS THAN 90%, C. Sedation score of 6  ondansetron Injectable 4 milliGRAM(s) IV Push every 6 hours PRN Nausea  oxyCODONE    IR 5 milliGRAM(s) Oral every 3 hours PRN Moderate Pain (4 - 6)      OBJECTIVE: Patient sitting up in bed, conversing and making appropriate eye contact.     Sedation Score:	[ X] Alert	[ ] Drowsy 	[ ] Arousable	[ ] Asleep	[ ] Unresponsive    Side Effects:	[X ] None	[ ] Nausea	[ ] Vomiting	[ ] Pruritus  		[ ] Other:    Vital Signs Last 24 Hrs  T(C): 36.6 (20 Apr 2021 09:48), Max: 37.4 (20 Apr 2021 00:02)  T(F): 97.8 (20 Apr 2021 09:48), Max: 99.3 (20 Apr 2021 00:02)  HR: 61 (20 Apr 2021 09:48) (60 - 79)  BP: 130/89 (20 Apr 2021 09:48) (124/83 - 163/90)  BP(mean): 90 (19 Apr 2021 22:15) (79 - 108)  RR: 18 (20 Apr 2021 09:48) (10 - 25)  SpO2: 100% (20 Apr 2021 09:48) (94% - 100%)    ASSESSMENT/ PLAN    Therapy to  be:	[ ] Continue   [ X] Discontinued   [X ] Change to prn Analgesics    Documentation and Verification of current medications:   [X] Done	[ ] Not done, not elligible    Comments: PRN Oral/IV opioids and/or Adjuvant non-opioid medication to be ordered at this point.    Progress Note written now but Patient was seen earlier.

## 2021-04-20 NOTE — PROGRESS NOTE ADULT - SUBJECTIVE AND OBJECTIVE BOX
A Team Surgery Daily Progress Note  =====================================================    SUBJECTIVE: Patient seen and examined at bedside on AM rounds. Tolerating diet, denies nausea, vomiting.   - Flatus/   - BM. OOB/Ambulating as tolerated. Denies fever, chills.     24 HOUR EVENTS:  S/P ex lap, mesenteric lymph node biopsies, GJ revision    MEDICATIONS  (STANDING):  acetaminophen  IVPB .. 1000 milliGRAM(s) IV Intermittent once  enoxaparin Injectable 40 milliGRAM(s) SubCutaneous daily  folic acid 1 milliGRAM(s) Oral daily  HYDROmorphone PCA (1 mG/mL) 30 milliLiter(s) PCA Continuous PCA Continuous  lactated ringers. 1000 milliLiter(s) (100 mL/Hr) IV Continuous <Continuous>  melatonin 3 milliGRAM(s) Oral at bedtime  pantoprazole  Injectable 40 milliGRAM(s) IV Push daily    MEDICATIONS  (PRN):  lidocaine   Patch 1 Patch Transdermal daily PRN Back pain  naloxone Injectable 0.1 milliGRAM(s) IV Push every 3 minutes PRN For ANY of the following changes in patient status:  A. RR LESS THAN 10 breaths per minute, B. Oxygen saturation LESS THAN 90%, C. Sedation score of 6  ondansetron Injectable 4 milliGRAM(s) IV Push every 6 hours PRN Nausea      OBJECTIVE:    Vital Signs Last 24 Hrs  T(C): 37.4 (20 Apr 2021 00:02), Max: 37.4 (20 Apr 2021 00:02)  T(F): 99.3 (20 Apr 2021 00:02), Max: 99.3 (20 Apr 2021 00:02)  HR: 71 (20 Apr 2021 00:02) (57 - 79)  BP: 149/70 (19 Apr 2021 22:00) (124/83 - 178/96)  BP(mean): 79 (19 Apr 2021 22:00) (79 - 108)  RR: 18 (20 Apr 2021 00:02) (10 - 25)  SpO2: 100% (20 Apr 2021 00:02) (94% - 100%)    PHYSICAL EXAM:  General: NAD, resting comfortably in bed  HEENT: Normocephalic atraumatic  Respiratory: Nonlabored respirations, normal CW expansion.  Cardio: S1S2, regular rate and rhythm.  Abdomen: softly distended, appropriately tender, surgical incisions are c/d/i.   Vascular: extremities are warm and well perfused.         I&O's Detail    18 Apr 2021 07:01  -  19 Apr 2021 07:00  --------------------------------------------------------  IN:    IV PiggyBack: 250 mL    Lactated Ringers: 1000 mL    Oral Fluid: 120 mL    Pantoprazole: 100 mL  Total IN: 1470 mL    OUT:    Voided (mL): 3100 mL  Total OUT: 3100 mL    Total NET: -1630 mL      19 Apr 2021 07:01  -  20 Apr 2021 01:16  --------------------------------------------------------  IN:    IV PiggyBack: 250 mL    Lactated Ringers: 600 mL    Pantoprazole: 40 mL  Total IN: 890 mL    OUT:    Voided (mL): 1950 mL  Total OUT: 1950 mL    Total NET: -1060 mL          Daily Height in cm: 190.5 (19 Apr 2021 13:09)    Daily     LABS:                        8.8    4.30  )-----------( 90       ( 19 Apr 2021 09:03 )             30.6     04-19    138  |  100  |  6<L>  ----------------------------<  120<H>  3.4<L>   |  27  |  0.58    Ca    8.5      19 Apr 2021 18:34  Phos  4.6     04-19  Mg     1.8     04-19      PT/INR - ( 19 Apr 2021 09:03 )   PT: 14.2 sec;   INR: 1.25 ratio         PTT - ( 19 Apr 2021 09:03 )  PTT:40.2 sec

## 2021-04-21 LAB
ANION GAP SERPL CALC-SCNC: 9 MMOL/L — SIGNIFICANT CHANGE UP (ref 7–14)
BUN SERPL-MCNC: 5 MG/DL — LOW (ref 7–23)
CALCIUM SERPL-MCNC: 9 MG/DL — SIGNIFICANT CHANGE UP (ref 8.4–10.5)
CHLORIDE SERPL-SCNC: 101 MMOL/L — SIGNIFICANT CHANGE UP (ref 98–107)
CO2 SERPL-SCNC: 27 MMOL/L — SIGNIFICANT CHANGE UP (ref 22–31)
CREAT SERPL-MCNC: 0.61 MG/DL — SIGNIFICANT CHANGE UP (ref 0.5–1.3)
GLUCOSE SERPL-MCNC: 88 MG/DL — SIGNIFICANT CHANGE UP (ref 70–99)
HCT VFR BLD CALC: 33 % — LOW (ref 39–50)
HGB BLD-MCNC: 9.7 G/DL — LOW (ref 13–17)
MAGNESIUM SERPL-MCNC: 1.8 MG/DL — SIGNIFICANT CHANGE UP (ref 1.6–2.6)
MCHC RBC-ENTMCNC: 22.8 PG — LOW (ref 27–34)
MCHC RBC-ENTMCNC: 29.4 GM/DL — LOW (ref 32–36)
MCV RBC AUTO: 77.6 FL — LOW (ref 80–100)
NRBC # BLD: 0 /100 WBCS — SIGNIFICANT CHANGE UP
NRBC # FLD: 0 K/UL — SIGNIFICANT CHANGE UP
PHOSPHATE SERPL-MCNC: 3.7 MG/DL — SIGNIFICANT CHANGE UP (ref 2.5–4.5)
PLATELET # BLD AUTO: 196 K/UL — SIGNIFICANT CHANGE UP (ref 150–400)
POTASSIUM SERPL-MCNC: 3.9 MMOL/L — SIGNIFICANT CHANGE UP (ref 3.5–5.3)
POTASSIUM SERPL-SCNC: 3.9 MMOL/L — SIGNIFICANT CHANGE UP (ref 3.5–5.3)
RBC # BLD: 4.25 M/UL — SIGNIFICANT CHANGE UP (ref 4.2–5.8)
RBC # FLD: SIGNIFICANT CHANGE UP (ref 10.3–14.5)
SODIUM SERPL-SCNC: 137 MMOL/L — SIGNIFICANT CHANGE UP (ref 135–145)
WBC # BLD: 5.51 K/UL — SIGNIFICANT CHANGE UP (ref 3.8–10.5)
WBC # FLD AUTO: 5.51 K/UL — SIGNIFICANT CHANGE UP (ref 3.8–10.5)

## 2021-04-21 RX ORDER — ACETAMINOPHEN 500 MG
1000 TABLET ORAL EVERY 6 HOURS
Refills: 0 | Status: DISCONTINUED | OUTPATIENT
Start: 2021-04-21 | End: 2021-04-24

## 2021-04-21 RX ORDER — PANTOPRAZOLE SODIUM 20 MG/1
40 TABLET, DELAYED RELEASE ORAL
Refills: 0 | Status: DISCONTINUED | OUTPATIENT
Start: 2021-04-21 | End: 2021-04-24

## 2021-04-21 RX ORDER — MAGNESIUM SULFATE 500 MG/ML
2 VIAL (ML) INJECTION ONCE
Refills: 0 | Status: COMPLETED | OUTPATIENT
Start: 2021-04-21 | End: 2021-04-21

## 2021-04-21 RX ADMIN — OXYCODONE HYDROCHLORIDE 10 MILLIGRAM(S): 5 TABLET ORAL at 12:30

## 2021-04-21 RX ADMIN — Medication 1000 MILLIGRAM(S): at 06:02

## 2021-04-21 RX ADMIN — Medication 1000 MILLIGRAM(S): at 12:30

## 2021-04-21 RX ADMIN — Medication 1000 MILLIGRAM(S): at 12:08

## 2021-04-21 RX ADMIN — Medication 1000 MILLIGRAM(S): at 01:38

## 2021-04-21 RX ADMIN — ENOXAPARIN SODIUM 40 MILLIGRAM(S): 100 INJECTION SUBCUTANEOUS at 12:08

## 2021-04-21 RX ADMIN — Medication 1 MILLIGRAM(S): at 12:08

## 2021-04-21 RX ADMIN — Medication 50 GRAM(S): at 12:08

## 2021-04-21 RX ADMIN — Medication 400 MILLIGRAM(S): at 01:08

## 2021-04-21 RX ADMIN — Medication 1000 MILLIGRAM(S): at 06:32

## 2021-04-21 RX ADMIN — OXYCODONE HYDROCHLORIDE 10 MILLIGRAM(S): 5 TABLET ORAL at 12:07

## 2021-04-21 RX ADMIN — Medication 3 MILLIGRAM(S): at 21:39

## 2021-04-21 RX ADMIN — OXYCODONE HYDROCHLORIDE 10 MILLIGRAM(S): 5 TABLET ORAL at 04:23

## 2021-04-21 RX ADMIN — OXYCODONE HYDROCHLORIDE 10 MILLIGRAM(S): 5 TABLET ORAL at 03:53

## 2021-04-21 RX ADMIN — Medication 1000 MILLIGRAM(S): at 17:44

## 2021-04-21 RX ADMIN — PANTOPRAZOLE SODIUM 40 MILLIGRAM(S): 20 TABLET, DELAYED RELEASE ORAL at 17:45

## 2021-04-21 RX ADMIN — CYCLOBENZAPRINE HYDROCHLORIDE 5 MILLIGRAM(S): 10 TABLET, FILM COATED ORAL at 06:59

## 2021-04-21 RX ADMIN — PANTOPRAZOLE SODIUM 40 MILLIGRAM(S): 20 TABLET, DELAYED RELEASE ORAL at 06:04

## 2021-04-21 NOTE — DIETITIAN INITIAL EVALUATION ADULT. - OTHER INFO
Patient reports good appetite and PO intake at this time. Tolerating diet well. Patient denies any nausea/vomiting/diarrhea/constipation or difficulty chewing and swallowing. Allergic to Shellfish, no other food allergies reported. Offered po supplement Ensure Enlive and patient amenable to try. Continue good po intake of nutrient and protein dense foods encouraged. Patient reports weight loss but unable to report amount and time frame.

## 2021-04-21 NOTE — PROGRESS NOTE ADULT - SUBJECTIVE AND OBJECTIVE BOX
A Team Surgery Daily Progress Note  =====================================================    SUBJECTIVE: Patient seen and examined at bedside on AM rounds. Patient reports that they're feeling well. Tolerating diet, denies nausea, vomiting.    -Flatus/    -BM. OOB/Ambulating as tolerated. Denies fever, chills.     24 HOUR EVENTS:  Advanced to Reg diet  Flexeril for muscle spasms  Off PCA, transitioned to PO pain meds    MEDICATIONS  (STANDING):  acetaminophen  IVPB .. 1000 milliGRAM(s) IV Intermittent every 6 hours  cyclobenzaprine 5 milliGRAM(s) Oral every 6 hours  enoxaparin Injectable 40 milliGRAM(s) SubCutaneous daily  folic acid 1 milliGRAM(s) Oral daily  melatonin 3 milliGRAM(s) Oral at bedtime  oxyCODONE    IR 10 milliGRAM(s) Oral every 3 hours  pantoprazole  Injectable 40 milliGRAM(s) IV Push daily    MEDICATIONS  (PRN):  lidocaine   Patch 1 Patch Transdermal daily PRN Back pain  naloxone Injectable 0.1 milliGRAM(s) IV Push every 3 minutes PRN For ANY of the following changes in patient status:  A. RR LESS THAN 10 breaths per minute, B. Oxygen saturation LESS THAN 90%, C. Sedation score of 6  ondansetron Injectable 4 milliGRAM(s) IV Push every 6 hours PRN Nausea  oxyCODONE    IR 5 milliGRAM(s) Oral every 3 hours PRN Moderate Pain (4 - 6)      OBJECTIVE:    Vital Signs Last 24 Hrs  T(C): 36.9 (21 Apr 2021 01:40), Max: 37 (20 Apr 2021 04:50)  T(F): 98.5 (21 Apr 2021 01:40), Max: 98.6 (20 Apr 2021 04:50)  HR: 60 (21 Apr 2021 01:40) (60 - 78)  BP: 155/91 (21 Apr 2021 01:40) (123/86 - 155/91)  BP(mean): --  RR: 18 (21 Apr 2021 01:40) (18 - 18)  SpO2: 100% (21 Apr 2021 01:40) (98% - 100%)    PHYSICAL EXAM:  General: NAD, resting comfortably in bed  HEENT: Normocephalic atraumatic  Respiratory: Nonlabored respirations, normal CW expansion.  Cardio: S1S2, regular rate and rhythm.  Abdomen: softly distended, appropriately tender, surgical incisions are c/d/i.   Vascular: extremities are warm and well perfused        I&O's Detail    19 Apr 2021 07:01  -  20 Apr 2021 07:00  --------------------------------------------------------  IN:    IV PiggyBack: 550 mL    Lactated Ringers: 1400 mL    Oral Fluid: 360 mL    Pantoprazole: 40 mL  Total IN: 2350 mL    OUT:    Voided (mL): 2600 mL  Total OUT: 2600 mL    Total NET: -250 mL      20 Apr 2021 07:01  -  21 Apr 2021 03:29  --------------------------------------------------------  IN:    dextrose 5% + sodium chloride 0.45% w/ Additives: 75 mL    IV PiggyBack: 450 mL    Oral Fluid: 300 mL  Total IN: 825 mL    OUT:    Voided (mL): 2325 mL  Total OUT: 2325 mL    Total NET: -1500 mL          Daily     Daily     LABS:                        9.4    7.60  )-----------( 151      ( 20 Apr 2021 07:13 )             32.8     04-20    141  |  105  |  6<L>  ----------------------------<  89  3.8   |  26  |  0.65    Ca    8.6      20 Apr 2021 07:13  Phos  4.4     04-20  Mg     1.9     04-20      PT/INR - ( 19 Apr 2021 09:03 )   PT: 14.2 sec;   INR: 1.25 ratio         PTT - ( 19 Apr 2021 09:03 )  PTT:40.2 sec

## 2021-04-21 NOTE — DIETITIAN INITIAL EVALUATION ADULT. - PERTINENT MEDS FT
acetaminophen   Tablet ..  cyclobenzaprine  folic acid  melatonin  oxyCODONE    IR  pantoprazole  Injectable

## 2021-04-21 NOTE — PROGRESS NOTE ADULT - ASSESSMENT
patient is a 55 year old male with PMhx of PUD s/p distal gastrectomy with Billroth II reconstruction c/b jejunogastric intussusception (2017), iron deficiency anemia  and ITP who presents with hematemesis and     Problem/Recommendation - 1:  Problem: Hematemesis. Recommendation: acute blood loss anemia   GI eval appreciated  rescucitaiton with PRBC transfusion  S/P EGD         Problem/Recommendation - 2:  ·  Problem: Intussusception.  Recommendation: Surgery as per surg team  plan for OR after medically optimized  IV hydration  active T&S , transfuse to keep hgb above 7.   S/P OR< tolerated well, pain control  oral feeding, tolerating      Problem/Recommendation - 3:  ·  Problem: Thrombocytopenia.  Recommendation: ITP   improved   with acute GI bleeding , stable now    plan of care per hematology   high risk for prednisone, hematology eval appreciated, plan for IVIG, premed per recs   monitor plt level  improved plt and hgb level, cont ot monitor     Problem/Recommendation - 4:  ·  Problem: Anemia.  Recommendation: UGIB with ITP  Transfuse to keep hgb above 7  GI and hematology eval appreciated  IV hydration   active T&S.    Problem/Recommendation - 5:  ·  Problem: Prophylactic measure.  Recommendation: DVT and gI PPX

## 2021-04-21 NOTE — PROGRESS NOTE ADULT - SUBJECTIVE AND OBJECTIVE BOX
Name of Patient : BLANCA PUCKETT  MRN: [id number]  DATE OF SERVICE: 04-21-21     Subjective: Patient seen and examined. No new events except as noted.   tolerating oral feeding   abdominal pain     REVIEW OF SYSTEMS:    CONSTITUTIONAL: No weakness, fevers or chills  EYES/ENT: No visual changes;  No vertigo or throat pain   NECK: No pain or stiffness  RESPIRATORY: No cough, wheezing, hemoptysis; No shortness of breath  CARDIOVASCULAR: No chest pain or palpitations  GASTROINTESTINAL: No abdominal or epigastric pain.  GENITOURINARY: No dysuria, frequency or hematuria  NEUROLOGICAL: No numbness or weakness  SKIN: No itching, burning, rashes, or lesions   All other review of systems is negative unless indicated above.    MEDICATIONS:  MEDICATIONS  (STANDING):  acetaminophen   Tablet .. 1000 milliGRAM(s) Oral every 6 hours  cyclobenzaprine 5 milliGRAM(s) Oral every 6 hours  enoxaparin Injectable 40 milliGRAM(s) SubCutaneous daily  folic acid 1 milliGRAM(s) Oral daily  melatonin 3 milliGRAM(s) Oral at bedtime  oxyCODONE    IR 10 milliGRAM(s) Oral every 3 hours  pantoprazole  Injectable 40 milliGRAM(s) IV Push two times a day      PHYSICAL EXAM:  T(C): 36.7 (04-21-21 @ 21:16), Max: 36.9 (04-21-21 @ 01:40)  HR: 81 (04-21-21 @ 21:16) (60 - 81)  BP: 117/70 (04-21-21 @ 21:16) (116/73 - 160/95)  RR: 17 (04-21-21 @ 21:16) (16 - 18)  SpO2: 99% (04-21-21 @ 21:16) (99% - 100%)  Wt(kg): --  I&O's Summary    20 Apr 2021 07:01  -  21 Apr 2021 07:00  --------------------------------------------------------  IN: 1025 mL / OUT: 2725 mL / NET: -1700 mL          Appearance: Normal	  HEENT:  PERRLA   Lymphatic: No lymphadenopathy   Cardiovascular: Normal S1 S2, no JVD  Respiratory: normal effort , clear  Gastrointestinal:  Soft, Non-tender  Skin: No rashes,  warm to touch  Psychiatry:  Mood & affect appropriate  Musculuskeletal: No edema      All labs, Imaging and EKGs personally reviewed       04-20-21 @ 07:01  -  04-21-21 @ 07:00  --------------------------------------------------------  IN: 1025 mL / OUT: 2725 mL / NET: -1700 mL                            9.7    5.51  )-----------( 196      ( 21 Apr 2021 07:04 )             33.0               04-21    137  |  101  |  5<L>  ----------------------------<  88  3.9   |  27  |  0.61    Ca    9.0      21 Apr 2021 07:04  Phos  3.7     04-21  Mg     1.8     04-21

## 2021-04-21 NOTE — DIETITIAN INITIAL EVALUATION ADULT. - PERTINENT LABORATORY DATA
04-21 Na137 mmol/L Glu 88 mg/dL K+ 3.9 mmol/L Cr  0.61 mg/dL BUN 5 mg/dL<L> 04-21 Phos 3.7 mg/dL 04-17 Alb 3.5 g/dL

## 2021-04-21 NOTE — PROGRESS NOTE ADULT - ASSESSMENT
Assessment and Plan:  55yMale patient w/ hx of PUD s/p distal gastrectomy with Billroth II reconstruction, c/b jejunogastric intussusception (2017) s/p EGD, chronic anemia, presents with hematemesis, likely 2/2 upper GI bleed from recurrent peptic ulcer vs marginal ulcer. S/p EGD with GI demonstrating multiple non bleeding ulcer, largest 3cm diameter. Now POD2 s/p ex lap, mesenteric lymph node biopsies, GJ revision, recovering well on floor.    Plan:  - Pain control PRN, off PCA  - Diet: Reg, IVL  - Activity: OOBAT  - DVT ppx: Lovenox  -F/u Path  - DIspo planning    A team surgery [id number] Assessment and Plan:  55yMale patient w/ hx of PUD s/p distal gastrectomy with Billroth II reconstruction, c/b jejunogastric intussusception (2017) s/p EGD, chronic anemia, presents with hematemesis, likely 2/2 upper GI bleed from recurrent peptic ulcer vs marginal ulcer. S/p EGD with GI demonstrating multiple non bleeding ulcer, largest 3cm diameter. Now POD2 s/p ex lap, mesenteric lymph node biopsies, GJ revision, recovering well on floor.    Plan:  - Pain control PRN, off PCA  - Diet: Reg soft, IVL  - Activity: OOBAT  - DVT ppx: Lovenox  -F/u Path  - DIspo planning    A team surgery [id number]

## 2021-04-21 NOTE — DIETITIAN NUTRITION RISK NOTIFICATION - MUSCLE MASS (LOSS OF MUSCLE)
Temples.../Clavicles.../Shoulders... 83 y.o. male BIBA as per daughter, past few weeks pt has been acting a bit confused, pt's currently on Paxil & Quetiapine, but stopped taking Quetiapine 1 week ago, pt lives alone.  pt was treated with Cipro 0n 9/10 for UTI, since pt has becoming more confused.  Daughter flew in from [place] today, found urine & diarrhea in the house, diaphoretic, tremors, pt admits to falling x2 yest. when trying to getting off the bed.  Denies any head injury, c/o Lt hip soreness, appetite decreased, weakness, dizziness, no fever, coughing 83 y.o. male BIBA as per daughter, pt's born with 1 kidney, past few weeks pt has been acting a bit confused, pt's currently on Paxil & Quetiapine, but stopped taking Quetiapine 1 week ago, pt lives alone.  pt was treated with Cipro 0n 9/10 for UTI, since pt has becoming more confused.  Daughter flew in from [place] today, found urine & diarrhea in the house, diaphoretic, tremors, pt admits to falling x2 yest. when trying to getting off the bed.  Denies any head injury, c/o Lt hip soreness, appetite decreased, weakness, dizziness, no fever, coughing

## 2021-04-22 LAB
ANION GAP SERPL CALC-SCNC: 8 MMOL/L — SIGNIFICANT CHANGE UP (ref 7–14)
BUN SERPL-MCNC: 9 MG/DL — SIGNIFICANT CHANGE UP (ref 7–23)
CALCIUM SERPL-MCNC: 9.2 MG/DL — SIGNIFICANT CHANGE UP (ref 8.4–10.5)
CHLORIDE SERPL-SCNC: 103 MMOL/L — SIGNIFICANT CHANGE UP (ref 98–107)
CO2 SERPL-SCNC: 29 MMOL/L — SIGNIFICANT CHANGE UP (ref 22–31)
CREAT SERPL-MCNC: 0.61 MG/DL — SIGNIFICANT CHANGE UP (ref 0.5–1.3)
GLUCOSE SERPL-MCNC: 103 MG/DL — HIGH (ref 70–99)
HCT VFR BLD CALC: 34.6 % — LOW (ref 39–50)
HGB BLD-MCNC: 10 G/DL — LOW (ref 13–17)
MAGNESIUM SERPL-MCNC: 1.9 MG/DL — SIGNIFICANT CHANGE UP (ref 1.6–2.6)
MCHC RBC-ENTMCNC: 22.7 PG — LOW (ref 27–34)
MCHC RBC-ENTMCNC: 28.9 GM/DL — LOW (ref 32–36)
MCV RBC AUTO: 78.5 FL — LOW (ref 80–100)
NRBC # BLD: 0 /100 WBCS — SIGNIFICANT CHANGE UP
NRBC # FLD: 0 K/UL — SIGNIFICANT CHANGE UP
PHOSPHATE SERPL-MCNC: 4.4 MG/DL — SIGNIFICANT CHANGE UP (ref 2.5–4.5)
PLATELET # BLD AUTO: 252 K/UL — SIGNIFICANT CHANGE UP (ref 150–400)
POTASSIUM SERPL-MCNC: 4 MMOL/L — SIGNIFICANT CHANGE UP (ref 3.5–5.3)
POTASSIUM SERPL-SCNC: 4 MMOL/L — SIGNIFICANT CHANGE UP (ref 3.5–5.3)
RBC # BLD: 4.41 M/UL — SIGNIFICANT CHANGE UP (ref 4.2–5.8)
RBC # FLD: SIGNIFICANT CHANGE UP (ref 10.3–14.5)
SODIUM SERPL-SCNC: 140 MMOL/L — SIGNIFICANT CHANGE UP (ref 135–145)
WBC # BLD: 5.3 K/UL — SIGNIFICANT CHANGE UP (ref 3.8–10.5)
WBC # FLD AUTO: 5.3 K/UL — SIGNIFICANT CHANGE UP (ref 3.8–10.5)

## 2021-04-22 RX ORDER — CYCLOBENZAPRINE HYDROCHLORIDE 10 MG/1
5 TABLET, FILM COATED ORAL THREE TIMES A DAY
Refills: 0 | Status: DISCONTINUED | OUTPATIENT
Start: 2021-04-22 | End: 2021-04-24

## 2021-04-22 RX ADMIN — PANTOPRAZOLE SODIUM 40 MILLIGRAM(S): 20 TABLET, DELAYED RELEASE ORAL at 05:31

## 2021-04-22 RX ADMIN — Medication 1000 MILLIGRAM(S): at 12:20

## 2021-04-22 RX ADMIN — Medication 1000 MILLIGRAM(S): at 07:01

## 2021-04-22 RX ADMIN — PANTOPRAZOLE SODIUM 40 MILLIGRAM(S): 20 TABLET, DELAYED RELEASE ORAL at 18:04

## 2021-04-22 RX ADMIN — Medication 1000 MILLIGRAM(S): at 00:57

## 2021-04-22 RX ADMIN — ENOXAPARIN SODIUM 40 MILLIGRAM(S): 100 INJECTION SUBCUTANEOUS at 11:41

## 2021-04-22 RX ADMIN — Medication 1000 MILLIGRAM(S): at 11:40

## 2021-04-22 RX ADMIN — Medication 1 MILLIGRAM(S): at 11:41

## 2021-04-22 RX ADMIN — Medication 1000 MILLIGRAM(S): at 00:27

## 2021-04-22 RX ADMIN — Medication 1000 MILLIGRAM(S): at 07:31

## 2021-04-22 NOTE — PROGRESS NOTE ADULT - SUBJECTIVE AND OBJECTIVE BOX
A Team Surgery Daily Progress Note  =====================================================    SUBJECTIVE: Patient seen and examined at bedside on AM rounds. Patient reports that they're feeling well. Tolerating diet, denies nausea, vomiting.    -Flatus/    -BM. OOB/Ambulating as tolerated. Denies fever, chills.     24 HOUR EVENTS:  no acute events    MEDICATIONS  (STANDING):  acetaminophen   Tablet .. 1000 milliGRAM(s) Oral every 6 hours  cyclobenzaprine 5 milliGRAM(s) Oral every 6 hours  enoxaparin Injectable 40 milliGRAM(s) SubCutaneous daily  folic acid 1 milliGRAM(s) Oral daily  melatonin 3 milliGRAM(s) Oral at bedtime  oxyCODONE    IR 10 milliGRAM(s) Oral every 3 hours  pantoprazole  Injectable 40 milliGRAM(s) IV Push two times a day    MEDICATIONS  (PRN):  lidocaine   Patch 1 Patch Transdermal daily PRN Back pain  naloxone Injectable 0.1 milliGRAM(s) IV Push every 3 minutes PRN For ANY of the following changes in patient status:  A. RR LESS THAN 10 breaths per minute, B. Oxygen saturation LESS THAN 90%, C. Sedation score of 6  ondansetron Injectable 4 milliGRAM(s) IV Push every 6 hours PRN Nausea  oxyCODONE    IR 5 milliGRAM(s) Oral every 3 hours PRN Moderate Pain (4 - 6)      OBJECTIVE:    Vital Signs Last 24 Hrs  T(C): 36.7 (21 Apr 2021 21:16), Max: 36.9 (21 Apr 2021 01:40)  T(F): 98.1 (21 Apr 2021 21:16), Max: 98.5 (21 Apr 2021 01:40)  HR: 81 (21 Apr 2021 21:16) (60 - 81)  BP: 117/70 (21 Apr 2021 21:16) (116/73 - 160/95)  BP(mean): --  RR: 17 (21 Apr 2021 21:16) (16 - 18)  SpO2: 99% (21 Apr 2021 21:16) (99% - 100%)    PHYSICAL EXAM:  General: NAD, resting comfortably in bed  HEENT: Normocephalic atraumatic  Respiratory: Nonlabored respirations, normal CW expansion.  Cardio: S1S2, regular rate and rhythm.  Abdomen: softly distended, appropriately tender, surgical incisions are c/d/i.   Vascular: extremities are warm and well perfused        I&O's Detail    20 Apr 2021 07:01  -  21 Apr 2021 07:00  --------------------------------------------------------  IN:    dextrose 5% + sodium chloride 0.45% w/ Additives: 75 mL    IV PiggyBack: 450 mL    Oral Fluid: 500 mL  Total IN: 1025 mL    OUT:    Voided (mL): 2725 mL  Total OUT: 2725 mL    Total NET: -1700 mL      21 Apr 2021 07:01  -  22 Apr 2021 01:17  --------------------------------------------------------  IN:    Oral Fluid: 200 mL  Total IN: 200 mL    OUT:    IV PiggyBack: 0 mL    Voided (mL): 200 mL  Total OUT: 200 mL    Total NET: 0 mL          Daily     Daily     LABS:                        9.7    5.51  )-----------( 196      ( 21 Apr 2021 07:04 )             33.0     04-21    137  |  101  |  5<L>  ----------------------------<  88  3.9   |  27  |  0.61    Ca    9.0      21 Apr 2021 07:04  Phos  3.7     04-21  Mg     1.8     04-21                        Assessment and Plan:        A team surgery [id number]       A Team Surgery Daily Progress Note  =====================================================    SUBJECTIVE: Patient seen and examined at bedside on AM rounds. Patient reports that they're feeling well. Tolerating diet, denies nausea, vomiting.    -Flatus/    -BM. OOB/Ambulating as tolerated. Denies fever, chills.     24 HOUR EVENTS:  no acute events    MEDICATIONS  (STANDING):  acetaminophen   Tablet .. 1000 milliGRAM(s) Oral every 6 hours  cyclobenzaprine 5 milliGRAM(s) Oral every 6 hours  enoxaparin Injectable 40 milliGRAM(s) SubCutaneous daily  folic acid 1 milliGRAM(s) Oral daily  melatonin 3 milliGRAM(s) Oral at bedtime  oxyCODONE    IR 10 milliGRAM(s) Oral every 3 hours  pantoprazole  Injectable 40 milliGRAM(s) IV Push two times a day    MEDICATIONS  (PRN):  lidocaine   Patch 1 Patch Transdermal daily PRN Back pain  naloxone Injectable 0.1 milliGRAM(s) IV Push every 3 minutes PRN For ANY of the following changes in patient status:  A. RR LESS THAN 10 breaths per minute, B. Oxygen saturation LESS THAN 90%, C. Sedation score of 6  ondansetron Injectable 4 milliGRAM(s) IV Push every 6 hours PRN Nausea  oxyCODONE    IR 5 milliGRAM(s) Oral every 3 hours PRN Moderate Pain (4 - 6)      OBJECTIVE:    Vital Signs Last 24 Hrs  T(C): 36.7 (21 Apr 2021 21:16), Max: 36.9 (21 Apr 2021 01:40)  T(F): 98.1 (21 Apr 2021 21:16), Max: 98.5 (21 Apr 2021 01:40)  HR: 81 (21 Apr 2021 21:16) (60 - 81)  BP: 117/70 (21 Apr 2021 21:16) (116/73 - 160/95)  BP(mean): --  RR: 17 (21 Apr 2021 21:16) (16 - 18)  SpO2: 99% (21 Apr 2021 21:16) (99% - 100%)    PHYSICAL EXAM:  General: NAD, resting comfortably in bed  HEENT: Normocephalic atraumatic  Respiratory: Nonlabored respirations, normal CW expansion.  Cardio: S1S2, regular rate and rhythm.  Abdomen: softly distended, appropriately tender, surgical incisions are c/d/i.   Vascular: extremities are warm and well perfused      I&O's Detail    20 Apr 2021 07:01  -  21 Apr 2021 07:00  --------------------------------------------------------  IN:    dextrose 5% + sodium chloride 0.45% w/ Additives: 75 mL    IV PiggyBack: 450 mL    Oral Fluid: 500 mL  Total IN: 1025 mL    OUT:    Voided (mL): 2725 mL  Total OUT: 2725 mL    Total NET: -1700 mL      21 Apr 2021 07:01  -  22 Apr 2021 01:17  --------------------------------------------------------  IN:    Oral Fluid: 200 mL  Total IN: 200 mL    OUT:    IV PiggyBack: 0 mL    Voided (mL): 200 mL  Total OUT: 200 mL    Total NET: 0 mL          Daily     Daily     LABS:                        9.7    5.51  )-----------( 196      ( 21 Apr 2021 07:04 )             33.0     04-21    137  |  101  |  5<L>  ----------------------------<  88  3.9   |  27  |  0.61    Ca    9.0      21 Apr 2021 07:04  Phos  3.7     04-21  Mg     1.8     04-21

## 2021-04-22 NOTE — PROGRESS NOTE ADULT - SUBJECTIVE AND OBJECTIVE BOX
Name of Patient : BLANCA PUCKETT  MRN: [id number]  DATE OF SERVICE: 04-22-21     Subjective: Patient seen and examined. No new events except as noted.   doing okay  tolerating oral feeding     REVIEW OF SYSTEMS:    CONSTITUTIONAL: No weakness, fevers or chills  EYES/ENT: No visual changes;  No vertigo or throat pain   NECK: No pain or stiffness  RESPIRATORY: No cough, wheezing, hemoptysis; No shortness of breath  CARDIOVASCULAR: No chest pain or palpitations  GASTROINTESTINAL: No abdominal or epigastric pain. No nausea, vomiting, or hematemesis; No diarrhea or constipation. No melena or hematochezia.  GENITOURINARY: No dysuria, frequency or hematuria  NEUROLOGICAL: No numbness or weakness  SKIN: No itching, burning, rashes, or lesions   All other review of systems is negative unless indicated above.    MEDICATIONS:  MEDICATIONS  (STANDING):  acetaminophen   Tablet .. 1000 milliGRAM(s) Oral every 6 hours  enoxaparin Injectable 40 milliGRAM(s) SubCutaneous daily  folic acid 1 milliGRAM(s) Oral daily  melatonin 3 milliGRAM(s) Oral at bedtime  oxyCODONE    IR 10 milliGRAM(s) Oral every 3 hours  pantoprazole  Injectable 40 milliGRAM(s) IV Push two times a day      PHYSICAL EXAM:  T(C): 36.7 (04-22-21 @ 13:11), Max: 36.7 (04-21-21 @ 21:16)  HR: 64 (04-22-21 @ 13:11) (64 - 90)  BP: 130/66 (04-22-21 @ 13:11) (113/74 - 130/66)  RR: 16 (04-22-21 @ 13:11) (16 - 18)  SpO2: 100% (04-22-21 @ 13:11) (96% - 100%)  Wt(kg): --  I&O's Summary    21 Apr 2021 07:01  -  22 Apr 2021 07:00  --------------------------------------------------------  IN: 400 mL / OUT: 650 mL / NET: -250 mL    22 Apr 2021 07:01  -  22 Apr 2021 17:29  --------------------------------------------------------  IN: 240 mL / OUT: 0 mL / NET: 240 mL          Appearance: Normal	  HEENT:  PERRLA   Lymphatic: No lymphadenopathy   Cardiovascular: Normal S1 S2, no JVD  Respiratory: normal effort , clear  Gastrointestinal:  Soft, Non-tender  Skin: No rashes,  warm to touch  Psychiatry:  Mood & affect appropriate  Musculuskeletal: No edema      All labs, Imaging and EKGs personally reviewed       04-21-21 @ 07:01  -  04-22-21 @ 07:00  --------------------------------------------------------  IN: 400 mL / OUT: 650 mL / NET: -250 mL    04-22-21 @ 07:01  -  04-22-21 @ 17:29  --------------------------------------------------------  IN: 240 mL / OUT: 0 mL / NET: 240 mL                            10.0   5.30  )-----------( 252      ( 22 Apr 2021 07:25 )             34.6               04-22    140  |  103  |  9   ----------------------------<  103<H>  4.0   |  29  |  0.61    Ca    9.2      22 Apr 2021 07:25  Phos  4.4     04-22  Mg     1.9     04-22

## 2021-04-22 NOTE — PROGRESS NOTE ADULT - ASSESSMENT
55yMale patient w/ hx of PUD s/p distal gastrectomy with Billroth II reconstruction, c/b jejunogastric intussusception (2017) s/p EGD, chronic anemia, presents with hematemesis, likely 2/2 upper GI bleed from recurrent peptic ulcer vs marginal ulcer. S/p EGD with GI demonstrating multiple non bleeding ulcer, largest 3cm diameter. Now POD3 s/p ex lap, mesenteric lymph node biopsies, GJ revision, recovering well on floor.    Plan:  - Pain control PRN, off PCA  - Diet: Reg soft, IVL  - Activity: OOBAT  - DVT ppx: Lovenox  - F/u Path  -Monitor BF  - Dispo planning    A team surgery [id number] 55yMale patient w/ hx of PUD s/p distal gastrectomy with Billroth II reconstruction, c/b jejunogastric intussusception (2017) s/p EGD, chronic anemia, presents with hematemesis, likely 2/2 upper GI bleed from recurrent peptic ulcer vs marginal ulcer. S/p EGD with GI demonstrating multiple non bleeding ulcer, largest 3cm diameter. Now  s/p ex lap, mesenteric lymph node biopsies, GJ revision on 4/19, recovering well on floor.    Plan:  - Pain control PRN  - Diet: Reg soft  - AROBF  - Activity: OOBAT  - DVT ppx: Lovenox  - F/u Path  - Dispo planning    A team surgery [id number]

## 2021-04-23 ENCOUNTER — TRANSCRIPTION ENCOUNTER (OUTPATIENT)
Age: 56
End: 2021-04-23

## 2021-04-23 LAB
ANION GAP SERPL CALC-SCNC: 11 MMOL/L — SIGNIFICANT CHANGE UP (ref 7–14)
BUN SERPL-MCNC: 8 MG/DL — SIGNIFICANT CHANGE UP (ref 7–23)
CALCIUM SERPL-MCNC: 8.9 MG/DL — SIGNIFICANT CHANGE UP (ref 8.4–10.5)
CHLORIDE SERPL-SCNC: 103 MMOL/L — SIGNIFICANT CHANGE UP (ref 98–107)
CO2 SERPL-SCNC: 26 MMOL/L — SIGNIFICANT CHANGE UP (ref 22–31)
CREAT SERPL-MCNC: 0.58 MG/DL — SIGNIFICANT CHANGE UP (ref 0.5–1.3)
GLUCOSE SERPL-MCNC: 83 MG/DL — SIGNIFICANT CHANGE UP (ref 70–99)
HCT VFR BLD CALC: 30.7 % — LOW (ref 39–50)
HEMATOPATHOLOGY REPORT: SIGNIFICANT CHANGE UP
HGB BLD-MCNC: 9.1 G/DL — LOW (ref 13–17)
MAGNESIUM SERPL-MCNC: 1.6 MG/DL — SIGNIFICANT CHANGE UP (ref 1.6–2.6)
MCHC RBC-ENTMCNC: 22.9 PG — LOW (ref 27–34)
MCHC RBC-ENTMCNC: 29.6 GM/DL — LOW (ref 32–36)
MCV RBC AUTO: 77.1 FL — LOW (ref 80–100)
NRBC # BLD: 0 /100 WBCS — SIGNIFICANT CHANGE UP
NRBC # FLD: 0 K/UL — SIGNIFICANT CHANGE UP
PHOSPHATE SERPL-MCNC: 3.6 MG/DL — SIGNIFICANT CHANGE UP (ref 2.5–4.5)
PLATELET # BLD AUTO: 321 K/UL — SIGNIFICANT CHANGE UP (ref 150–400)
POTASSIUM SERPL-MCNC: 3.8 MMOL/L — SIGNIFICANT CHANGE UP (ref 3.5–5.3)
POTASSIUM SERPL-SCNC: 3.8 MMOL/L — SIGNIFICANT CHANGE UP (ref 3.5–5.3)
RBC # BLD: 3.98 M/UL — LOW (ref 4.2–5.8)
RBC # FLD: SIGNIFICANT CHANGE UP (ref 10.3–14.5)
SODIUM SERPL-SCNC: 140 MMOL/L — SIGNIFICANT CHANGE UP (ref 135–145)
WBC # BLD: 3.92 K/UL — SIGNIFICANT CHANGE UP (ref 3.8–10.5)
WBC # FLD AUTO: 3.92 K/UL — SIGNIFICANT CHANGE UP (ref 3.8–10.5)

## 2021-04-23 RX ORDER — BENZOCAINE AND MENTHOL 5; 1 G/100ML; G/100ML
1 LIQUID ORAL
Refills: 0 | Status: DISCONTINUED | OUTPATIENT
Start: 2021-04-23 | End: 2021-04-24

## 2021-04-23 RX ORDER — MAGNESIUM SULFATE 500 MG/ML
2 VIAL (ML) INJECTION ONCE
Refills: 0 | Status: COMPLETED | OUTPATIENT
Start: 2021-04-23 | End: 2021-04-23

## 2021-04-23 RX ADMIN — Medication 1 MILLIGRAM(S): at 11:57

## 2021-04-23 RX ADMIN — Medication 1000 MILLIGRAM(S): at 12:29

## 2021-04-23 RX ADMIN — Medication 3 MILLIGRAM(S): at 21:41

## 2021-04-23 RX ADMIN — Medication 1000 MILLIGRAM(S): at 06:10

## 2021-04-23 RX ADMIN — ENOXAPARIN SODIUM 40 MILLIGRAM(S): 100 INJECTION SUBCUTANEOUS at 11:57

## 2021-04-23 RX ADMIN — Medication 1000 MILLIGRAM(S): at 05:38

## 2021-04-23 RX ADMIN — Medication 1000 MILLIGRAM(S): at 11:56

## 2021-04-23 RX ADMIN — Medication 1000 MILLIGRAM(S): at 23:51

## 2021-04-23 RX ADMIN — PANTOPRAZOLE SODIUM 40 MILLIGRAM(S): 20 TABLET, DELAYED RELEASE ORAL at 05:38

## 2021-04-23 RX ADMIN — Medication 50 GRAM(S): at 11:57

## 2021-04-23 RX ADMIN — PANTOPRAZOLE SODIUM 40 MILLIGRAM(S): 20 TABLET, DELAYED RELEASE ORAL at 17:49

## 2021-04-23 RX ADMIN — Medication 1000 MILLIGRAM(S): at 17:49

## 2021-04-23 NOTE — DISCHARGE NOTE PROVIDER - NSDCFUADDINST_GEN_ALL_CORE_FT
You may use Tylenol or Motrin for pain control every 6 hours, with oxycodone as needed if pain is not controlled with the Tylenol/Motrin. We suggest staggering the Tylenol/Motrin every 3 hours for maximum pain relief.     You may shower after the operation, but do not soak in the tub, pool, or ocean. The steri-strips will fall off on their own, do not pull them off. You will follow up outpatient with Dr. Garcia in 2 weeks to monitor your progress.     You should call the doctor's office if you develop intractable nausea, vomiting, or pain that cannot be controlled with oxycodone. If the doctor's office is not open or you feel this is an emergency, please call 911 or visit the nearest emergency room.

## 2021-04-23 NOTE — DISCHARGE NOTE PROVIDER - NSDCFUADDAPPT_GEN_ALL_CORE_FT
-Please have a repeat CBC drawn within 2 weeks of discharge to trend his hemoglobin and platelet count. Please follow up these results with your primary care doctor.     Hematology:  [facility] ([facility])   [address] [place], [place] [address]  Phone: [phone]  Follow up appointment: please schedule as soon as possible

## 2021-04-23 NOTE — DISCHARGE NOTE PROVIDER - NSDCCPCAREPLAN_GEN_ALL_CORE_FT
PRINCIPAL DISCHARGE DIAGNOSIS  Diagnosis: Intussusception  Assessment and Plan of Treatment: WOUND CARE:  Please keep incisions clean and dry. Please do not Scrub or rub incisions. Do not use lotion or powder on incisions.   BATHING: You may shower and/or sponge bathe. You may use warm soapy water in the shower and rinse, pat dry.  ACTIVITY: No heavy lifting or straining. Otherwise, you may return to your usual level of physical activity. If you are taking narcotic pain medication DO NOT drive a car, operate machinery or make important decisions.  DIET: Return to your usual diet.  NOTIFY YOUR SURGEON IF YOU HAVE: any bleeding that does not stop, any pus draining from your wound(s), any fever (over 100.4 F) persistent nausea/vomiting, or if your pain is not controlled on your discharge pain medications, unable to urinate.  Please follow up with your primary care physician in one week regarding your hospitalization, bring copies of your discharge paperwork.  Please follow up with your surgeon, Dr. Garcia within 2 weeks of discharge.      SECONDARY DISCHARGE DIAGNOSES  Diagnosis: Intussusception  Assessment and Plan of Treatment:     Diagnosis: Hematemesis  Assessment and Plan of Treatment: Hematemesis    Diagnosis: Thrombocytopenia  Assessment and Plan of Treatment: Thrombocytopenia    Diagnosis: Mesenteric lymphadenopathy  Assessment and Plan of Treatment:     Diagnosis: Anemia  Assessment and Plan of Treatment:      PRINCIPAL DISCHARGE DIAGNOSIS  Diagnosis: Intussusception  Assessment and Plan of Treatment: WOUND CARE:  Please keep incisions clean and dry. Please do not Scrub or rub incisions. Do not use lotion or powder on incisions.   BATHING: You may shower and/or sponge bathe. You may use warm soapy water in the shower and rinse, pat dry.  ACTIVITY: No heavy lifting or straining. Otherwise, you may return to your usual level of physical activity. If you are taking narcotic pain medication DO NOT drive a car, operate machinery or make important decisions.  DIET: Return to your usual diet.  NOTIFY YOUR SURGEON IF YOU HAVE: any bleeding that does not stop, any pus draining from your wound(s), any fever (over 100.4 F) persistent nausea/vomiting, or if your pain is not controlled on your discharge pain medications, unable to urinate.  Please follow up with your primary care physician in one week regarding your hospitalization, bring copies of your discharge paperwork.  Please follow up with your surgeon, Dr. Garcia within 2 weeks of discharge.      SECONDARY DISCHARGE DIAGNOSES  Diagnosis: Intussusception  Assessment and Plan of Treatment:     Diagnosis: Hematemesis  Assessment and Plan of Treatment: Hematemesis    Diagnosis: Thrombocytopenia  Assessment and Plan of Treatment: Thrombocytopenia    Diagnosis: Mesenteric lymphadenopathy  Assessment and Plan of Treatment:     Diagnosis: Anemia  Assessment and Plan of Treatment:     Diagnosis: Idiopathic thrombocytopenic purpura (ITP)  Assessment and Plan of Treatment: You were diagnosed with ITP and thrombocytopenia, you have a history of ITP. Hematology was consulted for the recommendation and treatment of this disease, you were treated with IVIG infusions. Prior to discharge your platelet counts obtained normal levels.  A prescription was given to you upon discharge to obtain blood work in the outpatient setting. Please get these labs drawn 7-10 days after discharge and follow up these results with your primary care doctor within 2 weeks of discharge.  Instructions for follow up with Hematology have been provided for you. Please call the [facility] to schedule an appointment as soon as possible.

## 2021-04-23 NOTE — PROGRESS NOTE ADULT - SUBJECTIVE AND OBJECTIVE BOX
A Team Surgery Daily Progress Note  =====================================================    SUBJECTIVE: Patient seen and examined at bedside on AM rounds. Patient reports that they're feeling well. Tolerating diet, denies nausea, vomiting. Reported having a BM yesterday afternoon. Not sure if he's passing gas. OOB/Ambulating as tolerated. Denies fever, chills.     24 HOUR EVENTS:  no acute events    MEDICATIONS  (STANDING):  acetaminophen   Tablet .. 1000 milliGRAM(s) Oral every 6 hours  enoxaparin Injectable 40 milliGRAM(s) SubCutaneous daily  folic acid 1 milliGRAM(s) Oral daily  melatonin 3 milliGRAM(s) Oral at bedtime  oxyCODONE    IR 10 milliGRAM(s) Oral every 3 hours  pantoprazole  Injectable 40 milliGRAM(s) IV Push two times a day    MEDICATIONS  (PRN):  cyclobenzaprine 5 milliGRAM(s) Oral three times a day PRN Muscle Spasm  lidocaine   Patch 1 Patch Transdermal daily PRN Back pain  naloxone Injectable 0.1 milliGRAM(s) IV Push every 3 minutes PRN For ANY of the following changes in patient status:  A. RR LESS THAN 10 breaths per minute, B. Oxygen saturation LESS THAN 90%, C. Sedation score of 6  ondansetron Injectable 4 milliGRAM(s) IV Push every 6 hours PRN Nausea  oxyCODONE    IR 5 milliGRAM(s) Oral every 3 hours PRN Moderate Pain (4 - 6)      OBJECTIVE:    Vital Signs Last 24 Hrs  T(C): 36.7 (22 Apr 2021 21:37), Max: 36.7 (22 Apr 2021 13:11)  T(F): 98.1 (22 Apr 2021 21:37), Max: 98.1 (22 Apr 2021 13:11)  HR: 78 (22 Apr 2021 21:37) (63 - 90)  BP: 133/92 (22 Apr 2021 21:37) (113/74 - 142/71)  BP(mean): --  RR: 18 (22 Apr 2021 21:37) (16 - 18)  SpO2: 100% (22 Apr 2021 21:37) (96% - 100%)    PHYSICAL EXAM:  General: NAD, resting comfortably in bed  HEENT: Normocephalic atraumatic  Respiratory: Nonlabored respirations, normal CW expansion.  Cardio: S1S2, regular rate and rhythm.  Abdomen: softly distended, appropriately tender, surgical incisions are c/d/i.   Vascular: extremities are warm and well perfused        I&O's Detail    21 Apr 2021 07:01  -  22 Apr 2021 07:00  --------------------------------------------------------  IN:    Oral Fluid: 400 mL  Total IN: 400 mL    OUT:    IV PiggyBack: 0 mL    Voided (mL): 650 mL  Total OUT: 650 mL    Total NET: -250 mL      22 Apr 2021 07:01  -  23 Apr 2021 00:52  --------------------------------------------------------  IN:    Oral Fluid: 480 mL  Total IN: 480 mL    OUT:    Voided (mL): 350 mL  Total OUT: 350 mL    Total NET: 130 mL          Daily     Daily     LABS:                        10.0   5.30  )-----------( 252      ( 22 Apr 2021 07:25 )             34.6     04-22    140  |  103  |  9   ----------------------------<  103<H>  4.0   |  29  |  0.61    Ca    9.2      22 Apr 2021 07:25  Phos  4.4     04-22  Mg     1.9     04-22

## 2021-04-23 NOTE — PROGRESS NOTE ADULT - ATTENDING SUPERVISION STATEMENT
Resident
Resident
Fellow
Fellow
Resident
Fellow
Resident
Fellow
Resident

## 2021-04-23 NOTE — CHART NOTE - NSCHARTNOTEFT_GEN_A_CORE
HEMATOLOGY FELLOW NOTE    Noted plans for discharge by primary team. AM labs are pending, but hemoglobin and platelets have been uptrending.    Patient will need follow-up with Hematology on discharge.    Recommendations:  -Please ensure patient has a follow-up CBC within 2 weeks of discharge to trend his hemoglobin and platelet count. These labs should be followed by patient's PCP   -We will refer patient to [facility] on discharge and arrange an outpatient appointment as soon as possible  -Please also include the number for the [facility] in the patient's discharge paperwork: [phone]    Please page or call with questions.    Rossy Ni MD  Hematology/Oncology Fellow, PGY-4  Pager: [phone]  After 5pm and on weekends please page on-call fellow

## 2021-04-23 NOTE — DISCHARGE NOTE PROVIDER - NSDCMRMEDTOKEN_GEN_ALL_CORE_FT
Bactrim  mg-160 mg oral tablet: 1 tab(s) orally 2 times a day   acetaminophen 500 mg oral tablet: 2 tab(s) orally every 6 hours MDD:No more than 8 tabs per day,  Bactrim  mg-160 mg oral tablet: 1 tab(s) orally 2 times a day  blood draw CBC: Please draw CBC with differential  cyclobenzaprine 5 mg oral tablet: 1 tab(s) orally 3 times a day, As needed, Muscle Spasm  oxyCODONE 5 mg oral capsule: 1 cap(s) orally every 6 hours MDD:No more than 4 pills per day.

## 2021-04-23 NOTE — DISCHARGE NOTE PROVIDER - NSDCCPTREATMENT_GEN_ALL_CORE_FT
PRINCIPAL PROCEDURE  Procedure: Enteropexy  Findings and Treatment:       SECONDARY PROCEDURE  Procedure: Biopsy, lymph node, mesenteric  Findings and Treatment:

## 2021-04-23 NOTE — PROGRESS NOTE ADULT - ASSESSMENT
Assessment and Plan:  55yMale patient w/ hx of PUD s/p distal gastrectomy with Billroth II reconstruction, c/b jejunogastric intussusception (2017) s/p EGD, chronic anemia, presents with hematemesis, likely 2/2 upper GI bleed from recurrent peptic ulcer vs marginal ulcer. S/p EGD with GI demonstrating multiple non bleeding ulcer, largest 3cm diameter. Now  s/p ex lap, mesenteric lymph node biopsies, GJ revision on 4/19, recovering well on floor.    Plan:  - Pain control PRN  - Diet: Reg soft  - AROBF  - Activity: OOBAT  - DVT ppx: Lovenox  - Dispo planning      A team surgery [id number] Assessment and Plan:  55yMale patient w/ hx of PUD s/p distal gastrectomy with Billroth II reconstruction, c/b jejunogastric intussusception (2017) s/p EGD, chronic anemia, presents with hematemesis, likely 2/2 upper GI bleed from recurrent peptic ulcer vs marginal ulcer. S/p EGD with GI demonstrating multiple non bleeding ulcer, largest 3cm diameter. Now  s/p ex lap, mesenteric lymph node biopsies, GJ revision on 4/19, recovering well on floor.    Plan:  - Pain control PRN  - Diet: Reg soft  - AROBF  - Activity: OOBAT  - DVT ppx: Lovenox  - Dispo planning  - Hematology following, discharge recommendations appreciated and included in discharge note.      A team surgery [id number]

## 2021-04-23 NOTE — DISCHARGE NOTE PROVIDER - HOSPITAL COURSE
56 y/o male with a PMHx PUD s/p distal gastrectomy with Billroth II reconstruction c/b jejunogastric intussusception (2017) s/p EGD, chronic anemia, ITP, presented 4/13 with hematemesis. CT Abdomen/Pelvis performed in the ED revealed recurrent jejunogastric intussusception. Initial blood work revealed anemia requiring multiple PRBC transfusions and a platelet count of 11. Hematology was consulted for recommendations for anemia and thrombocytopenia. For the treatment of ITP and at the recommendation of Hematology, the patient received IVIG infusions. The patient had a resolution of anemia and thrombocytopenia and was taken to the OR for treatment of recurrent jejunogastric intussusception on 4/19/21. Patient now s/p exlap revealing extensive mesenteric lymphadenopathy, gj anastamosis intact, not intussuscepted, s/p pexy afferent to efferent limb, as well as efferent limb to anterior abdominal wall. The patient tolerated the procedure well. Post-operatively the patient was sent to the PACU. The patient was hemodynamically stable and was transferred to a surgical floor. The patient had daily wound care and was seen by physical therapy which recommended discharge to home/rehab. The patient's pain was controlled by IV pain medications and then by PO pain medications. The patient was advanced to a regular diet and tolerated it well. The patient was placed on home medications. At the time of discharge, the patient was hemodynamically stable, was tolerating PO diet, was voiding urine and passing stool, was ambulating, and was comfortable with adequate pain control. The patient was instructed to follow up with Dr. Garcia within 2 weeks after discharge from the hospital. The patient/family felt comfortable with discharge.  The patient had no other issues. 54 y/o male with a PMHx PUD s/p distal gastrectomy with Billroth II reconstruction c/b jejunogastric intussusception (2017) s/p EGD, chronic anemia, ITP, presented 4/13 with hematemesis. CT Abdomen/Pelvis performed in the ED revealed recurrent jejunogastric intussusception. Initial blood work revealed anemia requiring multiple PRBC transfusions and a platelet count of 11. Hematology was consulted for recommendations for anemia and thrombocytopenia. For the treatment of ITP and at the recommendation of Hematology, the patient received IVIG infusions. The patient had a resolution of anemia and thrombocytopenia and was taken to the OR for treatment of recurrent jejunogastric intussusception on 4/19/21. Patient now s/p exlap revealing extensive mesenteric lymphadenopathy, gj anastamosis intact, not intussuscepted, s/p pexy afferent to efferent limb, as well as efferent limb to anterior abdominal wall. The patient tolerated the procedure well. Post-operatively the patient was sent to the PACU. The patient was hemodynamically stable and was transferred to a surgical floor. The patient's pain was controlled by IV pain medications and then by PO pain medications. The patient was advanced to a regular diet and tolerated it well. The patient was placed on home medications. At the time of discharge, the patient was hemodynamically stable, was tolerating PO diet, was voiding urine and passing stool, was ambulating, and was comfortable with adequate pain control. The patient was instructed to follow up with Dr. Garcia within 2 weeks after discharge from the hospital. The patient/family felt comfortable with discharge.  The patient had no other issues.

## 2021-04-23 NOTE — PROGRESS NOTE ADULT - ATTENDING COMMENTS
No further overt bleeding.   With persistent thrombocytopenia, Hgb slowly improving with transfusions (though inappropriately low response).   Heme following - per notes, plan for treatment with IVIG for likely ITP.   Will await medical optimization prior to EGD.
Patient seen and examined  DOing well  No nausea or vomiting  Tolerated clear liquids    Awake, alert  Breathing comfortably  Abd is soft, not tender and not distended  Reducible umbilical hernia    - diet as tolerated, NPO after midnight  - will discuss with following heme/onc team tomorrow before OR
Patient seen and examined  s/p upper endoscopy  No nausea or vomiting  Passing flatus    Abd is soft, not tender and not distended  No rebound, no guarding    - clear liquid diet  - appreciate heme/onc recommendations  - OR planning for Monday for revision of anastomosis
Patient seen in follow up for anemia and thrombocytopenia in the setting of GI bleed. Both anemia and thrombocytopenia are known conditions in this patient. 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  ITP has been treated with IVIG only (steroid avoided due to GI bleed), responded to treatment, achieved normal platelet count. Will need some form of long term management plan for ITP. low dose maintenance or Rituximab may be considered. Supportive.
No further hematmesis or BM.   Awaiting medical optimization, but will plan for EGD once Hgb >/= 7, Plt >/= 50.
Patient seen and examined  DOing well  No nausea or vomiting  Passing flatus and had a BM    Awake, alert  Breathing comfortably  Abd is soft, not tender and not distended    - appreciate GI endoscopy, results reviewed  - appreciate continued heme/onc optimization  - OR planning for Monday
Patient seen and examined  Has abdominal pain at incision site  No nausea or vomiting  No fever or chills  Tolerated some clear liquids    - clear liquid diet  - continue pain control  - out of bed, and ambulate as tolerated  - follow up pathology
Patient seen and examined  No nausea or vomiting  No abdominal pain  Complains of lower back pain    On exam: awake, alert  Breathing comfortably  Abd is soft, not tender and not distended    - appreciate continued heme/onc assistance in optimization for procedures  - GI plan for endoscopy when optimized  - OR for revision to prevent further recurrences this admission, possible next week pending optimization and endoscopy
Patient seen and examined  Doing well  Passing flatus, had BM    Abd is soft, not distended, appropriately  tender  Incision is clean, dry and intact without any surround erythema    - diet as tolerated  - plan for discharge to home tomorrow  - will need heme follow up as outpatient
Patient seen and examined  Pain improving  No nausea or vomiting  Tolerating some PO intake    Abd is soft, mild distension, appropriately tender    - diet as tolerated  - ambulate as tolerated
Patient seen and examined  Transfused 4 units PRBC, 2 x platelets  Inappropriate response, but currently hemodynamically normal  No nausea, vomiting, fever or chills  Has some lower back pain    On exam: awake, alert  BReathing comfortably  Abd is soft, not distended, not tender  Reducible umbilical hernia      - appreciate heme/onc recommendations, will continue to transfuse PRBC, will hold on transfusion of platelets, continue IVIG  - appreciate GI recommendations, EGD when more medically optimized  - will eventually need surgical intervention as has had multiple episodes of intussusception
Patient seen in follow up for anemia and thrombocytopenia in the setting of GI bleed. Both anemia and thrombocytopenia are known conditions in this patient. 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 Agree with fellow suggested management plan on anemia w/u and treating ITP with IVIG only, at this time. Continue with D2 IVIG, monitor CBC, watch for bleeding, supportive care. Please send a RBC osmotic fragility test.
Patient seen and examined  Doing well  Pain improving  Tolerating PO intake  No flatus or BM    Abd is soft, mild distension, not tender    - diet as tolerated  - ambulate as tolerated

## 2021-04-23 NOTE — DISCHARGE NOTE PROVIDER - CARE PROVIDER_API CALL
Ronnie Garcia)  Surgery  [address] [facility] [address]  [place] [address]  Phone: [phone]  Fax: [phone]  Follow Up Time: 2 weeks

## 2021-04-23 NOTE — DISCHARGE NOTE PROVIDER - DETAILS OF MALNUTRITION DIAGNOSIS/DIAGNOSES
This patient has been assessed with a concern for Malnutrition and was treated during this hospitalization for the following Nutrition diagnosis/diagnoses:     -  04/21/2021: Severe protein-calorie malnutrition

## 2021-04-23 NOTE — PROGRESS NOTE ADULT - SUBJECTIVE AND OBJECTIVE BOX
Name of Patient : BLANCA PUCKETT  MRN: [id number]  DATE OF SERVICE: 04-23-21 @ 22:46    Subjective: Patient seen and examined. No new events except as noted.     REVIEW OF SYSTEMS:    CONSTITUTIONAL: No weakness, fevers or chills  EYES/ENT: No visual changes;  No vertigo or throat pain   NECK: No pain or stiffness  RESPIRATORY: No cough, wheezing, hemoptysis; No shortness of breath  CARDIOVASCULAR: No chest pain or palpitations  GASTROINTESTINAL: No abdominal or epigastric pain. No nausea, vomiting, or hematemesis; No diarrhea or constipation. No melena or hematochezia.  GENITOURINARY: No dysuria, frequency or hematuria  NEUROLOGICAL: No numbness or weakness  SKIN: No itching, burning, rashes, or lesions   All other review of systems is negative unless indicated above.    MEDICATIONS:  MEDICATIONS  (STANDING):  acetaminophen   Tablet .. 1000 milliGRAM(s) Oral every 6 hours  enoxaparin Injectable 40 milliGRAM(s) SubCutaneous daily  folic acid 1 milliGRAM(s) Oral daily  melatonin 3 milliGRAM(s) Oral at bedtime  oxyCODONE    IR 10 milliGRAM(s) Oral every 3 hours  pantoprazole  Injectable 40 milliGRAM(s) IV Push two times a day      PHYSICAL EXAM:  T(C): 36.6 (04-23-21 @ 18:18), Max: 37.1 (04-23-21 @ 05:34)  HR: 65 (04-23-21 @ 18:18) (65 - 82)  BP: 128/78 (04-23-21 @ 18:18) (127/86 - 144/84)  RR: 18 (04-23-21 @ 18:18) (17 - 18)  SpO2: 100% (04-23-21 @ 18:18) (100% - 100%)  Wt(kg): --  I&O's Summary    22 Apr 2021 07:01  -  23 Apr 2021 07:00  --------------------------------------------------------  IN: 830 mL / OUT: 750 mL / NET: 80 mL    23 Apr 2021 07:01 - 23 Apr 2021 22:46  --------------------------------------------------------  IN: 0 mL / OUT: 650 mL / NET: -650 mL          Appearance: Normal	  HEENT:  PERRLA   Lymphatic: No lymphadenopathy   Cardiovascular: Normal S1 S2, no JVD  Respiratory: normal effort , clear  Gastrointestinal:  Soft, Non-tender  Skin: No rashes,  warm to touch  Psychiatry:  Mood & affect appropriate  Musculuskeletal: No edema      All labs, Imaging and EKGs personally reviewed       04-22-21 @ 07:01  -  04-23-21 @ 07:00  --------------------------------------------------------  IN: 830 mL / OUT: 750 mL / NET: 80 mL    04-23-21 @ 07:01  -  04-23-21 @ 22:46  --------------------------------------------------------  IN: 0 mL / OUT: 650 mL / NET: -650 mL                            9.1    3.92  )-----------( 321      ( 23 Apr 2021 07:23 )             30.7               04-23    140  |  103  |  8   ----------------------------<  83  3.8   |  26  |  0.58    Ca    8.9      23 Apr 2021 07:23  Phos  3.6     04-23  Mg     1.6     04-23

## 2021-04-24 ENCOUNTER — TRANSCRIPTION ENCOUNTER (OUTPATIENT)
Age: 56
End: 2021-04-24

## 2021-04-24 VITALS
OXYGEN SATURATION: 100 % | SYSTOLIC BLOOD PRESSURE: 134 MMHG | TEMPERATURE: 98 F | RESPIRATION RATE: 18 BRPM | DIASTOLIC BLOOD PRESSURE: 89 MMHG | HEART RATE: 58 BPM

## 2021-04-24 RX ORDER — CYCLOBENZAPRINE HYDROCHLORIDE 10 MG/1
1 TABLET, FILM COATED ORAL
Qty: 21 | Refills: 0
Start: 2021-04-24 | End: 2021-04-30

## 2021-04-24 RX ORDER — ACETAMINOPHEN 500 MG
2 TABLET ORAL
Qty: 56 | Refills: 0
Start: 2021-04-24 | End: 2021-04-30

## 2021-04-24 RX ORDER — OXYCODONE HYDROCHLORIDE 5 MG/1
1 TABLET ORAL
Qty: 16 | Refills: 0
Start: 2021-04-24 | End: 2021-04-27

## 2021-04-24 RX ADMIN — Medication 1000 MILLIGRAM(S): at 11:56

## 2021-04-24 RX ADMIN — Medication 1000 MILLIGRAM(S): at 12:35

## 2021-04-24 RX ADMIN — Medication 1000 MILLIGRAM(S): at 00:21

## 2021-04-24 RX ADMIN — Medication 1000 MILLIGRAM(S): at 06:00

## 2021-04-24 RX ADMIN — Medication 1000 MILLIGRAM(S): at 06:30

## 2021-04-24 RX ADMIN — ENOXAPARIN SODIUM 40 MILLIGRAM(S): 100 INJECTION SUBCUTANEOUS at 11:56

## 2021-04-24 RX ADMIN — Medication 1 MILLIGRAM(S): at 11:56

## 2021-04-24 RX ADMIN — PANTOPRAZOLE SODIUM 40 MILLIGRAM(S): 20 TABLET, DELAYED RELEASE ORAL at 06:00

## 2021-04-24 NOTE — PROGRESS NOTE ADULT - PROVIDER SPECIALTY LIST ADULT
Pain Medicine
Surgery
Surgery
Gastroenterology
Internal Medicine
Surgery
Surgery
Anesthesia
Internal Medicine
Surgery
Heme/Onc
Internal Medicine
Surgery
Surgery
Gastroenterology
Heme/Onc
Internal Medicine
Internal Medicine
Surgery
Surgery
Internal Medicine

## 2021-04-24 NOTE — PROGRESS NOTE ADULT - NSICDXPILOT_GEN_ALL_CORE
[place]

## 2021-04-24 NOTE — DISCHARGE NOTE NURSING/CASE MANAGEMENT/SOCIAL WORK - PATIENT PORTAL LINK FT
You can access the FollowMyHealth Patient Portal offered by [facility] by registering at the following website: [email or web address]. By joining [employer]’s FollowMyHealth portal, you will also be able to view your health information using other applications (apps) compatible with our system.

## 2021-04-24 NOTE — PROGRESS NOTE ADULT - SUBJECTIVE AND OBJECTIVE BOX
Name of Patient : BLANCA PUCKETT  MRN: [id number]  DATE OF SERVICE: 04-24-21     Subjective: Patient seen and examined. No new events except as noted.   doing okay   D/C Planing per surg team   tolerating oral feeding     REVIEW OF SYSTEMS:    CONSTITUTIONAL: No weakness, fevers or chills  EYES/ENT: No visual changes;  No vertigo or throat pain   NECK: No pain or stiffness  RESPIRATORY: No cough, wheezing, hemoptysis; No shortness of breath  CARDIOVASCULAR: No chest pain or palpitations  GASTROINTESTINAL: No abdominal or epigastric pain. No nausea, vomiting, or hematemesis; No diarrhea or constipation. No melena or hematochezia.  GENITOURINARY: No dysuria, frequency or hematuria  NEUROLOGICAL: No numbness or weakness  SKIN: No itching, burning, rashes, or lesions   All other review of systems is negative unless indicated above.    MEDICATIONS:  MEDICATIONS  (STANDING):  acetaminophen   Tablet .. 1000 milliGRAM(s) Oral every 6 hours  enoxaparin Injectable 40 milliGRAM(s) SubCutaneous daily  folic acid 1 milliGRAM(s) Oral daily  melatonin 3 milliGRAM(s) Oral at bedtime  oxyCODONE    IR 10 milliGRAM(s) Oral every 3 hours  pantoprazole  Injectable 40 milliGRAM(s) IV Push two times a day      PHYSICAL EXAM:  T(C): 36.7 (04-24-21 @ 09:43), Max: 37 (04-23-21 @ 23:49)  HR: 58 (04-24-21 @ 09:43) (58 - 98)  BP: 134/89 (04-24-21 @ 09:43) (127/80 - 145/90)  RR: 18 (04-24-21 @ 09:43) (17 - 18)  SpO2: 100% (04-24-21 @ 09:43) (99% - 100%)  Wt(kg): --  I&O's Summary    23 Apr 2021 07:01  -  24 Apr 2021 07:00  --------------------------------------------------------  IN: 240 mL / OUT: 650 mL / NET: -410 mL    24 Apr 2021 07:01  -  24 Apr 2021 11:27  --------------------------------------------------------  IN: 240 mL / OUT: 0 mL / NET: 240 mL          Appearance: Normal	  HEENT:  PERRLA   Lymphatic: No lymphadenopathy   Cardiovascular: Normal S1 S2, no JVD  Respiratory: normal effort , clear  Gastrointestinal:  Soft, Non-tender  Skin: No rashes,  warm to touch  Psychiatry:  Mood & affect appropriate  Musculuskeletal: No edema      All labs, Imaging and EKGs personally reviewed     04-23-21 @ 07:01  -  04-24-21 @ 07:00  --------------------------------------------------------  IN: 240 mL / OUT: 650 mL / NET: -410 mL    04-24-21 @ 07:01  -  04-24-21 @ 11:27  --------------------------------------------------------  IN: 240 mL / OUT: 0 mL / NET: 240 mL                              9.1    3.92  )-----------( 321      ( 23 Apr 2021 07:23 )             30.7               04-23    140  |  103  |  8   ----------------------------<  83  3.8   |  26  |  0.58    Ca    8.9      23 Apr 2021 07:23  Phos  3.6     04-23  Mg     1.6     04-23

## 2021-04-24 NOTE — PROGRESS NOTE ADULT - NUTRITIONAL ASSESSMENT
This patient has been assessed with a concern for Malnutrition and has been determined to have a diagnosis/diagnoses of Severe protein-calorie malnutrition.    This patient is being managed with:   Diet Soft-  Entered: Apr 21 2021  6:46AM    

## 2021-04-24 NOTE — PROGRESS NOTE ADULT - SUBJECTIVE AND OBJECTIVE BOX
A Team Surgery Daily Progress Note  =====================================================    SUBJECTIVE: Patient seen and examined at bedside on AM rounds. Patient reports that they're feeling well. Tolerating diet, denies nausea, vomiting.    +Flatus/   + BM. OOB/Ambulating as tolerated. Denies fever, chills.     24 HOUR EVENTS:  script given for outpatient CBC  no acute events overnight    MEDICATIONS  (STANDING):  acetaminophen   Tablet .. 1000 milliGRAM(s) Oral every 6 hours  enoxaparin Injectable 40 milliGRAM(s) SubCutaneous daily  folic acid 1 milliGRAM(s) Oral daily  melatonin 3 milliGRAM(s) Oral at bedtime  oxyCODONE    IR 10 milliGRAM(s) Oral every 3 hours  pantoprazole  Injectable 40 milliGRAM(s) IV Push two times a day    MEDICATIONS  (PRN):  benzocaine 15 mG/menthol 3.6 mG (Sugar-Free) Lozenge 1 Lozenge Oral every 3 hours PRN Sore Throat  cyclobenzaprine 5 milliGRAM(s) Oral three times a day PRN Muscle Spasm  lidocaine   Patch 1 Patch Transdermal daily PRN Back pain  naloxone Injectable 0.1 milliGRAM(s) IV Push every 3 minutes PRN For ANY of the following changes in patient status:  A. RR LESS THAN 10 breaths per minute, B. Oxygen saturation LESS THAN 90%, C. Sedation score of 6  ondansetron Injectable 4 milliGRAM(s) IV Push every 6 hours PRN Nausea      OBJECTIVE:    Vital Signs Last 24 Hrs  T(C): 37 (23 Apr 2021 23:49), Max: 37.1 (23 Apr 2021 05:34)  T(F): 98.6 (23 Apr 2021 23:49), Max: 98.8 (23 Apr 2021 05:34)  HR: 70 (23 Apr 2021 23:49) (65 - 82)  BP: 127/80 (23 Apr 2021 23:49) (127/80 - 144/84)  BP(mean): --  RR: 17 (23 Apr 2021 23:49) (17 - 18)  SpO2: 99% (23 Apr 2021 23:49) (99% - 100%)    PHYSICAL EXAM:  General: NAD, resting comfortably in bed  HEENT: Normocephalic atraumatic  Respiratory: Nonlabored respirations, normal CW expansion.  Cardio: S1S2, regular rate and rhythm.  Abdomen: softly distended, appropriately tender, surgical incisions are c/d/i.   Vascular: extremities are warm and well perfused        I&O's Detail    22 Apr 2021 07:01  -  23 Apr 2021 07:00  --------------------------------------------------------  IN:    Oral Fluid: 830 mL  Total IN: 830 mL    OUT:    Voided (mL): 750 mL  Total OUT: 750 mL    Total NET: 80 mL      23 Apr 2021 07:01  -  24 Apr 2021 00:20  --------------------------------------------------------  IN:  Total IN: 0 mL    OUT:    Voided (mL): 650 mL  Total OUT: 650 mL    Total NET: -650 mL          Daily     Daily     LABS:                        9.1    3.92  )-----------( 321      ( 23 Apr 2021 07:23 )             30.7     04-23    140  |  103  |  8   ----------------------------<  83  3.8   |  26  |  0.58    Ca    8.9      23 Apr 2021 07:23  Phos  3.6     04-23  Mg     1.6     04-23

## 2021-04-24 NOTE — PROGRESS NOTE ADULT - ASSESSMENT
patient is a 55 year old male with PMhx of PUD s/p distal gastrectomy with Billroth II reconstruction c/b jejunogastric intussusception (2017), iron deficiency anemia  and ITP who presents with hematemesis and     Problem/Recommendation - 1:  Problem: Hematemesis. Recommendation: acute blood loss anemia   GI eval appreciated  rescucitaiton with PRBC transfusion  S/P EGD  replace all electrolytes   D/C Planing            Problem/Recommendation - 2:  ·  Problem: Intussusception.  Recommendation: Surgery as per surg team  plan for OR after medically optimized  IV hydration  active T&S , transfuse to keep hgb above 7.   S/P OR< tolerated well, pain control  oral feeding, tolerating      Problem/Recommendation - 3:  ·  Problem: Thrombocytopenia.  Recommendation: ITP   improved   with acute GI bleeding , stable now    plan of care per hematology   high risk for prednisone, hematology eval appreciated, plan for IVIG, premed per recs   monitor plt level  improved plt and hgb level, cont ot monitor     Problem/Recommendation - 4:  ·  Problem: Anemia.  Recommendation: UGIB with ITP  Transfuse to keep hgb above 7  GI and hematology eval appreciated  IV hydration   active T&S.    Problem/Recommendation - 5:  ·  Problem: Prophylactic measure.  Recommendation: DVT and gI PPX

## 2021-04-24 NOTE — DISCHARGE NOTE NURSING/CASE MANAGEMENT/SOCIAL WORK - NSDCFUADDAPPT_GEN_ALL_CORE_FT
-Please have a repeat CBC drawn within 2 weeks of discharge to trend his hemoglobin and platelet count. Please follow up these results with your primary care doctor.     Hematology:  [facility] ([facility])   [address] [place], [place] [id number]  Phone: [phone]  Follow up appointment: please schedule as soon as possible

## 2021-04-24 NOTE — PROGRESS NOTE ADULT - ASSESSMENT
Assessment and Plan:  55yMale patient w/ hx of PUD s/p distal gastrectomy with Billroth II reconstruction, c/b jejunogastric intussusception (2017) s/p EGD, chronic anemia, presents with hematemesis, likely 2/2 upper GI bleed from recurrent peptic ulcer vs marginal ulcer. S/p EGD with GI demonstrating multiple non bleeding ulcer, largest 3cm diameter. Now  s/p ex lap, mesenteric lymph node biopsies, GJ revision on 4/19, recovering well on floor.    Plan:  - Pain control PRN  - Diet: Reg soft  - Activity: OOBAT  - DVT ppx: Lovenox  - Dispo planning: DC home  - Hematology following, discharge recommendations appreciated and included in discharge note.        A team surgery [id number]

## 2021-04-24 NOTE — DISCHARGE NOTE NURSING/CASE MANAGEMENT/SOCIAL WORK - NSDCPNINST_GEN_ALL_CORE
Please follow up with your surgeon within two weeks. Please resume regular activity and diet as tolerated. You may shower but no rubbing/scrubbing of incision site. Pat dry. Take prescribed pain medication as needed.

## 2021-05-26 ENCOUNTER — OUTPATIENT (OUTPATIENT)
Dept: OUTPATIENT SERVICES | Facility: HOSPITAL | Age: 56
LOS: 1 days | Discharge: HOME | End: 2021-05-26

## 2021-05-26 DIAGNOSIS — Z98.0 INTESTINAL BYPASS AND ANASTOMOSIS STATUS: Chronic | ICD-10-CM

## 2021-05-26 DIAGNOSIS — D64.9 ANEMIA, UNSPECIFIED: ICD-10-CM

## 2021-05-27 ENCOUNTER — APPOINTMENT (OUTPATIENT)
Dept: HEMATOLOGY ONCOLOGY | Facility: CLINIC | Age: 56
End: 2021-05-27
Payer: COMMERCIAL

## 2021-05-27 NOTE — HISTORY OF PRESENT ILLNESS
[de-identified] : 56 y/o male with a PMHx PUD s/p distal gastrectomy with Billroth II reconstruction c/b jejunogastric intussusception (2017) s/p EGD, chronic anemia, ITP, presented 4/13 with hematemesis. CT Abdomen/Pelvis performed in the ED revealed recurrent jejunogastric intussusception. Initial blood work revealed anemia requiring multiple PRBC transfusions and a platelet count of 11. Hematology was consulted for recommendations for anemia and thrombocytopenia. For the treatment of ITP and at the recommendation of Hematology, the patient received IVIG infusions. The patient had a resolution of anemia and thrombocytopenia and was taken to the OR for treatment of recurrent jejunogastric intussusception on 4/19/21. Patient now s/p exlap revealing extensive mesenteric lymphadenopathy, gj anastamosis intact, not intussuscepted, s/p pexy afferent to efferent limb, as well as efferent limb to anterior abdominal wall. The patient tolerated the procedure well. Post-operatively the patient was sent to the PACU. The patient was hemodynamically stable and was transferred to a surgical floor. The patient's pain was controlled by IV pain medications and then by PO pain medications. The patient was advanced to a regular diet and tolerated it well. The patient was

## 2021-06-24 ENCOUNTER — RESULT REVIEW (OUTPATIENT)
Age: 56
End: 2021-06-24

## 2021-06-24 ENCOUNTER — APPOINTMENT (OUTPATIENT)
Dept: HEMATOLOGY ONCOLOGY | Facility: CLINIC | Age: 56
End: 2021-06-24
Payer: COMMERCIAL

## 2021-06-24 ENCOUNTER — APPOINTMENT (OUTPATIENT)
Dept: HEMATOLOGY ONCOLOGY | Facility: CLINIC | Age: 56
End: 2021-06-24

## 2021-06-24 VITALS
DIASTOLIC BLOOD PRESSURE: 81 MMHG | HEART RATE: 65 BPM | TEMPERATURE: 98.1 F | RESPIRATION RATE: 16 BRPM | WEIGHT: 165.99 LBS | BODY MASS INDEX: 22 KG/M2 | OXYGEN SATURATION: 99 % | SYSTOLIC BLOOD PRESSURE: 130 MMHG | HEIGHT: 73 IN

## 2021-06-24 DIAGNOSIS — Z78.9 OTHER SPECIFIED HEALTH STATUS: ICD-10-CM

## 2021-06-24 LAB
BASOPHILS # BLD AUTO: 0.04 K/UL — SIGNIFICANT CHANGE UP (ref 0–0.2)
BASOPHILS NFR BLD AUTO: 1 % — SIGNIFICANT CHANGE UP (ref 0–2)
EOSINOPHIL # BLD AUTO: 0.3 K/UL — SIGNIFICANT CHANGE UP (ref 0–0.5)
EOSINOPHIL NFR BLD AUTO: 7.4 % — HIGH (ref 0–6)
ERYTHROCYTE [SEDIMENTATION RATE] IN BLOOD: 4 MM/HR — SIGNIFICANT CHANGE UP (ref 0–20)
HCT VFR BLD CALC: 42 % — SIGNIFICANT CHANGE UP (ref 39–50)
HGB BLD-MCNC: 13.1 G/DL — SIGNIFICANT CHANGE UP (ref 13–17)
IMM GRANULOCYTES NFR BLD AUTO: 0.7 % — SIGNIFICANT CHANGE UP (ref 0–1.5)
LYMPHOCYTES # BLD AUTO: 0.96 K/UL — LOW (ref 1–3.3)
LYMPHOCYTES # BLD AUTO: 23.7 % — SIGNIFICANT CHANGE UP (ref 13–44)
MCHC RBC-ENTMCNC: 24.8 PG — LOW (ref 27–34)
MCHC RBC-ENTMCNC: 31.2 G/DL — LOW (ref 32–36)
MCV RBC AUTO: 79.4 FL — LOW (ref 80–100)
MONOCYTES # BLD AUTO: 0.35 K/UL — SIGNIFICANT CHANGE UP (ref 0–0.9)
MONOCYTES NFR BLD AUTO: 8.6 % — SIGNIFICANT CHANGE UP (ref 2–14)
NEUTROPHILS # BLD AUTO: 2.37 K/UL — SIGNIFICANT CHANGE UP (ref 1.8–7.4)
NEUTROPHILS NFR BLD AUTO: 58.6 % — SIGNIFICANT CHANGE UP (ref 43–77)
NRBC # BLD: 0 /100 WBCS — SIGNIFICANT CHANGE UP (ref 0–0)
PLATELET # BLD AUTO: 188 K/UL — SIGNIFICANT CHANGE UP (ref 150–400)
RBC # BLD: 5.29 M/UL — SIGNIFICANT CHANGE UP (ref 4.2–5.8)
RBC # FLD: 15.4 % — HIGH (ref 10.3–14.5)
RETICS #: 18.8 K/UL — LOW (ref 25–125)
RETICS/RBC NFR: 0.4 % — LOW (ref 0.5–2.5)
WBC # BLD: 4.05 K/UL — SIGNIFICANT CHANGE UP (ref 3.8–10.5)
WBC # FLD AUTO: 4.05 K/UL — SIGNIFICANT CHANGE UP (ref 3.8–10.5)

## 2021-06-24 PROCEDURE — 99215 OFFICE O/P EST HI 40 MIN: CPT

## 2021-06-24 PROCEDURE — 99072 ADDL SUPL MATRL&STAF TM PHE: CPT

## 2021-06-25 LAB
ALBUMIN SERPL ELPH-MCNC: 4.3 G/DL
ALP BLD-CCNC: 122 U/L
ALT SERPL-CCNC: 17 U/L
ANION GAP SERPL CALC-SCNC: 12 MMOL/L
AST SERPL-CCNC: 22 U/L
BILIRUB SERPL-MCNC: 0.5 MG/DL
BUN SERPL-MCNC: 7 MG/DL
CALCIUM SERPL-MCNC: 9.7 MG/DL
CHLORIDE SERPL-SCNC: 98 MMOL/L
CO2 SERPL-SCNC: 32 MMOL/L
CREAT SERPL-MCNC: 0.64 MG/DL
FERRITIN SERPL-MCNC: 27 NG/ML
FOLATE SERPL-MCNC: 12.6 NG/ML
GLUCOSE SERPL-MCNC: 96 MG/DL
IRON SATN MFR SERPL: 60 %
IRON SERPL-MCNC: 174 UG/DL
LDH SERPL-CCNC: 150 U/L
POTASSIUM SERPL-SCNC: 4.7 MMOL/L
PROT SERPL-MCNC: 7.7 G/DL
SODIUM SERPL-SCNC: 142 MMOL/L
TIBC SERPL-MCNC: 290 UG/DL
UIBC SERPL-MCNC: 115 UG/DL
VIT B12 SERPL-MCNC: 321 PG/ML

## 2021-06-27 PROBLEM — Z78.9 DOES NOT USE TOBACCO: Status: ACTIVE | Noted: 2021-06-27

## 2021-06-28 NOTE — ASSESSMENT
[FreeTextEntry1] : 56 yo male with h/o PUD s/p distal gastrectomy with Billroth II reconstruction c/b recurrent jejunogastric intussusception- most recent 4/13/21, ITP and chronic anemia. Patient seen in consultation post hospital stay 4/13 to 4/24/21. He received several PRBC transfusions and IVIG, HGB and PLT normal at discharge and today HGB 13.1 g/dL and PLT 188K.\par \par Plan\par --CBC, CMP, Iron studies, Ferritin, Vitamin B12 and Folate, Serum immunoelectrophoresis.\par --Advised establishing care with PCP and GI, referral provided\par --CBC check in  2 weeks, IV iron supplementation as needed.\par --Per Dr. Noyola patient to repeat bone marrow biopsy - dysplastic cells reported on the 2016 BMBX. Patient declined despite offer to have test done under anesthesia.\par --Follow up in 4 weeks\par

## 2021-06-28 NOTE — HISTORY OF PRESENT ILLNESS
[de-identified] : Efren Ely is a 56 yo male with h/o PUD s/p distal gastrectomy with Billroth II reconstruction c/b jejunogastric intussusception (2017) s/p EGD, chronic anemia, ITP. Presented to [facility] ER on 4/1321 with hematemesis. CT Abdomen/Pelvis performed in the ED revealed recurrent jejunogastric intussusception. Initial blood work revealed anemia requiring multiple PRBC transfusions and a platelet count of 11. Hematology was consulted for recommendations for anemia and thrombocytopenia. For the treatment of ITP and at the recommendation of Hematology, the patient received IVIG infusions. The patient had a resolution of anemia and thrombocytopenia and was taken to the OR for treatment of recurrent jejunogastric intussusception on 4/19/21. Patient now s/p exlap revealing extensive mesenteric lymphadenopathy, gj anastomosis intact, not intussuscepted, s/p pexy afferent to efferent limb, as well as efferent limb to anterior abdominal wall. The patient tolerated the procedure well. Lymph node biopsies were negative. HIV and Hepatitis negative.\par \par The patient presents today to establish care. He reports 20+ year history of anemia. Does not tolerate oral iron, has received IV ferrous in the past. Reports h/o bone marrow biopsies x 3. Biopsy results dated 2016 shows " hypercellular bone marrow biopsy with erythroid hyperplasia, increase in pronormoblasts and mild dysplastic changes, maturing myeloid, megakaryocytes are increased in number with normal morphology (small and early forms of megakaryocytes) and absent iron stores. The chronicity of his ITP is unclear, but the patient had 4 hospital stays for GI bleeding October 2014, August 2016, November 2017 and April 2021- thrombocytopenia was documented during each stay.\par \par The patient reports feeling well today. He denies hematemesis, melena and hematochezia. Denies fevers, chills, night sweats and unintentional weight loss. Denies family history of anemia. He was born in [place] and moved [place] the [place] age 12. The patient is single, does not have any children. Denies h/o tobacco use. Reports rare social alcohol use. The patient does not have a PCP and Gastroenterologist.

## 2021-06-30 LAB
ALBUMIN MFR SERPL ELPH: 52.7 %
ALBUMIN SERPL-MCNC: 4.1 G/DL
ALBUMIN/GLOB SERPL: 1.1 RATIO
ALPHA1 GLOB MFR SERPL ELPH: 3 %
ALPHA1 GLOB SERPL ELPH-MCNC: 0.2 G/DL
ALPHA2 GLOB MFR SERPL ELPH: 7.4 %
ALPHA2 GLOB SERPL ELPH-MCNC: 0.6 G/DL
B-GLOBULIN MFR SERPL ELPH: 11.3 %
B-GLOBULIN SERPL ELPH-MCNC: 0.9 G/DL
DEPRECATED KAPPA LC FREE/LAMBDA SER: 2.78 RATIO
GAMMA GLOB FLD ELPH-MCNC: 2 G/DL
GAMMA GLOB MFR SERPL ELPH: 25.6 %
IGA SER QL IEP: 420 MG/DL
IGG SER QL IEP: 1837 MG/DL
IGM SER QL IEP: 168 MG/DL
INTERPRETATION SERPL IEP-IMP: NORMAL
KAPPA LC CSF-MCNC: 2.06 MG/DL
KAPPA LC SERPL-MCNC: 5.73 MG/DL
M PROTEIN SPEC IFE-MCNC: NORMAL
PROT SERPL-MCNC: 7.7 G/DL
PROT SERPL-MCNC: 7.7 G/DL

## 2021-07-01 ENCOUNTER — NON-APPOINTMENT (OUTPATIENT)
Age: 56
End: 2021-07-01

## 2021-07-01 ENCOUNTER — APPOINTMENT (OUTPATIENT)
Dept: INTERNAL MEDICINE | Facility: CLINIC | Age: 56
End: 2021-07-01
Payer: COMMERCIAL

## 2021-07-01 VITALS
HEIGHT: 70.5 IN | DIASTOLIC BLOOD PRESSURE: 89 MMHG | WEIGHT: 163 LBS | BODY MASS INDEX: 23.08 KG/M2 | HEART RATE: 98 BPM | SYSTOLIC BLOOD PRESSURE: 157 MMHG | OXYGEN SATURATION: 99 %

## 2021-07-01 DIAGNOSIS — K56.1 INTUSSUSCEPTION: ICD-10-CM

## 2021-07-01 DIAGNOSIS — Z00.00 ENCOUNTER FOR GENERAL ADULT MEDICAL EXAMINATION W/OUT ABNORMAL FINDINGS: ICD-10-CM

## 2021-07-01 PROCEDURE — 36415 COLL VENOUS BLD VENIPUNCTURE: CPT

## 2021-07-01 PROCEDURE — 99072 ADDL SUPL MATRL&STAF TM PHE: CPT

## 2021-07-01 PROCEDURE — 99204 OFFICE O/P NEW MOD 45 MIN: CPT | Mod: 25

## 2021-07-01 PROCEDURE — 93000 ELECTROCARDIOGRAM COMPLETE: CPT

## 2021-07-02 ENCOUNTER — NON-APPOINTMENT (OUTPATIENT)
Age: 56
End: 2021-07-02

## 2021-07-02 LAB
25(OH)D3 SERPL-MCNC: 11.6 NG/ML
ALBUMIN SERPL ELPH-MCNC: 4.1 G/DL
ALP BLD-CCNC: 117 U/L
ALT SERPL-CCNC: 18 U/L
ANION GAP SERPL CALC-SCNC: 15 MMOL/L
AST SERPL-CCNC: 21 U/L
BASOPHILS # BLD AUTO: 0.04 K/UL
BASOPHILS NFR BLD AUTO: 0.8 %
BILIRUB SERPL-MCNC: 0.2 MG/DL
BUN SERPL-MCNC: 5 MG/DL
CALCIUM SERPL-MCNC: 9.4 MG/DL
CHLORIDE SERPL-SCNC: 106 MMOL/L
CHOLEST SERPL-MCNC: 135 MG/DL
CO2 SERPL-SCNC: 25 MMOL/L
CREAT SERPL-MCNC: 0.84 MG/DL
EOSINOPHIL # BLD AUTO: 0.27 K/UL
EOSINOPHIL NFR BLD AUTO: 5.6 %
ESTIMATED AVERAGE GLUCOSE: 103 MG/DL
GLUCOSE SERPL-MCNC: 73 MG/DL
HBA1C MFR BLD HPLC: 5.2 %
HCT VFR BLD CALC: 41.8 %
HDLC SERPL-MCNC: 49 MG/DL
HGB BLD-MCNC: 12.7 G/DL
IMM GRANULOCYTES NFR BLD AUTO: 0.2 %
LDLC SERPL CALC-MCNC: 69 MG/DL
LYMPHOCYTES # BLD AUTO: 1.29 K/UL
LYMPHOCYTES NFR BLD AUTO: 26.7 %
MAN DIFF?: NORMAL
MCHC RBC-ENTMCNC: 25 PG
MCHC RBC-ENTMCNC: 30.4 GM/DL
MCV RBC AUTO: 82.1 FL
MONOCYTES # BLD AUTO: 0.5 K/UL
MONOCYTES NFR BLD AUTO: 10.3 %
NEUTROPHILS # BLD AUTO: 2.73 K/UL
NEUTROPHILS NFR BLD AUTO: 56.4 %
NONHDLC SERPL-MCNC: 86 MG/DL
PLATELET # BLD AUTO: 157 K/UL
POTASSIUM SERPL-SCNC: 4.3 MMOL/L
PROT SERPL-MCNC: 7.2 G/DL
PSA SERPL-MCNC: 0.75 NG/ML
RBC # BLD: 5.09 M/UL
RBC # FLD: 15.3 %
SODIUM SERPL-SCNC: 146 MMOL/L
TRIGL SERPL-MCNC: 88 MG/DL
WBC # FLD AUTO: 4.84 K/UL

## 2021-07-02 NOTE — HISTORY OF PRESENT ILLNESS
[de-identified] : 55 year old patient here today to establish care with a PCP.  He has a recently hospitalization and has a history of anemia, possible PUD,  intussusception, and ITP.  While in the hospital he received several units of blood.  He recently had seen heme.  He was advised to see a primary and also GI.  He had an endoscopy while in the hospital.  \par \par He says his colonoscopy was done 2 years ago. \par \par He is not taking a PPI. \par \par He did not get the COVID vaccine. \par He ate some salt this AM. \par \par \par

## 2021-07-02 NOTE — ASSESSMENT
[FreeTextEntry1] : This is a 55 year -old  M who was examined today for an annual preventative physical.  A complete history and physical examination were performed.  The patient seems to follow a healthy lifestyle in that he  follows a good diet and exercises regularly.  \par \par Physical examination was entirely within normal limits , including a normal blood pressure and pulse.  \par \par Cognitive Issues  __x_No   ___Yes\par Fall Risk                __x_No   ___Yes\par \par The following recommendations were made:\par Exercise regularly.\par Maintain a healthy diet.\par _____________________________________________________\par \par Recommended he FU with ENT\par also recommend he see cardio\par \par fu in 6 months with me but explained he will need close monitoring of all labs due to the ITP and anemia \par \par also discussed the need for a PPI, he said he was taking one and will start again

## 2021-07-02 NOTE — HEALTH RISK ASSESSMENT
[Good] : ~his/her~  mood as  good [No falls in past year] : Patient reported no falls in the past year [0] : 2) Feeling down, depressed, or hopeless: Not at all (0) [Fully functional (bathing, dressing, toileting, transferring, walking, feeding)] : Fully functional (bathing, dressing, toileting, transferring, walking, feeding) [Fully functional (using the telephone, shopping, preparing meals, housekeeping, doing laundry, using] : Fully functional and needs no help or supervision to perform IADLs (using the telephone, shopping, preparing meals, housekeeping, doing laundry, using transportation, managing medications and managing finances) [[id number]] : 0 [ColonoscopyComments] : 2 years ago

## 2021-07-07 ENCOUNTER — OUTPATIENT (OUTPATIENT)
Dept: OUTPATIENT SERVICES | Facility: HOSPITAL | Age: 56
LOS: 1 days | Discharge: HOME | End: 2021-07-07

## 2021-07-07 DIAGNOSIS — Z98.0 INTESTINAL BYPASS AND ANASTOMOSIS STATUS: Chronic | ICD-10-CM

## 2021-07-07 DIAGNOSIS — D64.9 ANEMIA, UNSPECIFIED: ICD-10-CM

## 2021-07-08 ENCOUNTER — APPOINTMENT (OUTPATIENT)
Dept: HEMATOLOGY ONCOLOGY | Facility: CLINIC | Age: 56
End: 2021-07-08

## 2021-07-08 DIAGNOSIS — D64.9 ANEMIA, UNSPECIFIED: ICD-10-CM

## 2021-07-15 ENCOUNTER — APPOINTMENT (OUTPATIENT)
Dept: CARDIOLOGY | Facility: CLINIC | Age: 56
End: 2021-07-15

## 2021-07-27 ENCOUNTER — APPOINTMENT (OUTPATIENT)
Dept: OTOLARYNGOLOGY | Facility: CLINIC | Age: 56
End: 2021-07-27

## 2021-08-02 ENCOUNTER — APPOINTMENT (OUTPATIENT)
Dept: GASTROENTEROLOGY | Facility: CLINIC | Age: 56
End: 2021-08-02

## 2021-08-05 ENCOUNTER — NON-APPOINTMENT (OUTPATIENT)
Age: 56
End: 2021-08-05

## 2021-08-25 ENCOUNTER — OUTPATIENT (OUTPATIENT)
Dept: OUTPATIENT SERVICES | Facility: HOSPITAL | Age: 56
LOS: 1 days | Discharge: HOME | End: 2021-08-25

## 2021-08-25 DIAGNOSIS — Z98.0 INTESTINAL BYPASS AND ANASTOMOSIS STATUS: Chronic | ICD-10-CM

## 2021-08-25 DIAGNOSIS — D64.9 ANEMIA, UNSPECIFIED: ICD-10-CM

## 2021-08-26 ENCOUNTER — APPOINTMENT (OUTPATIENT)
Dept: HEMATOLOGY ONCOLOGY | Facility: CLINIC | Age: 56
End: 2021-08-26

## 2021-08-26 NOTE — HISTORY OF PRESENT ILLNESS
[de-identified] : Efren Ely is a 57 yo male with h/o PUD s/p distal gastrectomy with Billroth II reconstruction c/b jejunogastric intussusception (2017) s/p EGD, chronic anemia, ITP. Presented to [facility] ER on 4/1321 with hematemesis. CT Abdomen/Pelvis performed in the ED revealed recurrent jejunogastric intussusception. Initial blood work revealed anemia requiring multiple PRBC transfusions and a platelet count of 11. Hematology was consulted for recommendations for anemia and thrombocytopenia. For the treatment of ITP and at the recommendation of Hematology, the patient received IVIG infusions. The patient had a resolution of anemia and thrombocytopenia and was taken to the OR for treatment of recurrent jejunogastric intussusception on 4/19/21. Patient now s/p exlap revealing extensive mesenteric lymphadenopathy, gj anastomosis intact, not intussuscepted, s/p pexy afferent to efferent limb, as well as efferent limb to anterior abdominal wall. The patient tolerated the procedure well. Lymph node biopsies were negative. HIV and Hepatitis negative.\par \par The patient presents today to establish care. He reports 20+ year history of anemia. Does not tolerate oral iron, has received IV ferrous in the past. Reports h/o bone marrow biopsies x 3. Biopsy results dated 2016 shows " hypercellular bone marrow biopsy with erythroid hyperplasia, increase in pronormoblasts and mild dysplastic changes, maturing myeloid, megakaryocytes are increased in number with normal morphology (small and early forms of megakaryocytes) and absent iron stores. The chronicity of his ITP is unclear, but the patient had 4 hospital stays for GI bleeding October 2014, August 2016, November 2017 and April 2021- thrombocytopenia was documented during each stay.\par \par The patient reports feeling well today. He denies hematemesis, melena and hematochezia. Denies fevers, chills, night sweats and unintentional weight loss. Denies family history of anemia. He was born in [place] and [place] the [place] age 12. The patient is single, does not have any children. Denies h/o tobacco use. Reports rare social alcohol use. The patient does not have a PCP and Gastroenterologist. [de-identified] : S/P Feraheme x 2 doses. \par \par No other changes in medical, surgical or social history since 6/24/21. \par  [0 - No Distress] : Distress Level: 0

## 2021-08-30 ENCOUNTER — APPOINTMENT (OUTPATIENT)
Dept: HEMATOLOGY ONCOLOGY | Facility: CLINIC | Age: 56
End: 2021-08-30

## 2021-08-30 DIAGNOSIS — D69.3 IMMUNE THROMBOCYTOPENIC PURPURA: ICD-10-CM

## 2021-09-03 ENCOUNTER — APPOINTMENT (OUTPATIENT)
Dept: HEMATOLOGY ONCOLOGY | Facility: CLINIC | Age: 56
End: 2021-09-03

## 2021-09-03 DIAGNOSIS — D50.9 IRON DEFICIENCY ANEMIA, UNSPECIFIED: ICD-10-CM

## 2021-09-03 NOTE — HISTORY OF PRESENT ILLNESS
[de-identified] : Efren Ely is a 57 yo male with h/o PUD s/p distal gastrectomy with Billroth II reconstruction c/b jejunogastric intussusception (2017) s/p EGD, chronic anemia, ITP. Presented to [facility] ER on 4/1321 with hematemesis. CT Abdomen/Pelvis performed in the ED revealed recurrent jejunogastric intussusception. Initial blood work revealed anemia requiring multiple PRBC transfusions and a platelet count of 11. Hematology was consulted for recommendations for anemia and thrombocytopenia. For the treatment of ITP and at the recommendation of Hematology, the patient received IVIG infusions. The patient had a resolution of anemia and thrombocytopenia and was taken to the OR for treatment of recurrent jejunogastric intussusception on 4/19/21. Patient now s/p exlap revealing extensive mesenteric lymphadenopathy, gj anastomosis intact, not intussuscepted, s/p pexy afferent to efferent limb, as well as efferent limb to anterior abdominal wall. The patient tolerated the procedure well. Lymph node biopsies were negative. HIV and Hepatitis negative.\par \par The patient presents today to establish care. He reports 20+ year history of anemia. Does not tolerate oral iron, has received IV ferrous in the past. Reports h/o bone marrow biopsies x 3. Biopsy results dated 2016 shows " hypercellular bone marrow biopsy with erythroid hyperplasia, increase in pronormoblasts and mild dysplastic changes, maturing myeloid, megakaryocytes are increased in number with normal morphology (small and early forms of megakaryocytes) and absent iron stores. The chronicity of his ITP is unclear, but the patient had 4 hospital stays for GI bleeding October 2014, August 2016, November 2017 and April 2021- thrombocytopenia was documented during each stay.\par \par The patient reports feeling well today. He denies hematemesis, melena and hematochezia. Denies fevers, chills, night sweats and unintentional weight loss. Denies family history of anemia. He was born in [place] and moved [place] the [place] at age 12. The patient is single, does not have any children. Denies h/o tobacco use. Reports rare social alcohol use. The patient does not have a PCP and Gastroenterologist.

## 2021-09-03 NOTE — ASSESSMENT
[FreeTextEntry1] : 57 yo male with h/o PUD s/p distal gastrectomy with Billroth II reconstruction c/b recurrent jejunogastric intussusception- most recent 4/13/21, ITP and chronic anemia. Patient seen in consultation post hospital stay 4/13 to 4/24/21. He received several PRBC transfusions and IVIG, HGB and PLT normal at discharge and today HGB 13.1 g/dL and PLT 188K.\par \par Plan\par --CBC, CMP, Iron studies, Ferritin, Vitamin B12 and Folate, Serum immunoelectrophoresis.\par --Advised establishing care with PCP and GI, referral provided\par --CBC check in  2 weeks, IV iron supplementation as needed.\par --Recommended  bone marrow biopsy - dysplastic cells reported on the 2016 BMBX. Patient declined despite offer to have test done under anesthesia.\par

## 2022-05-11 NOTE — CONSULT NOTE ADULT - CONSULT REQUESTED DATE/TIME
13-Apr-2021
From: Edna Shook  To: An Rocha  Sent: 5/11/2022 3:09 PM CDT  Subject: Foot pain    Hi. Last year I saw Dr. Jabier Sánchez at [address] [place] [address] and [facility] in [place]. After many tests he said that I have neuropathy in feet. He gave me custom orthotics and folic acid supplement one of which has helped. It seems to have gotten worse and wondering what you would recommend, is there any med to help? I prefer to not go back to podiatrist since he hasnât helped.   Thank you  Edna Shook
13-Apr-2021
13-Apr-2021 19:05

## 2022-07-12 ENCOUNTER — EMERGENCY (EMERGENCY)
Facility: HOSPITAL | Age: 57
LOS: 1 days | Discharge: HOME | End: 2022-07-12
Attending: EMERGENCY MEDICINE | Admitting: EMERGENCY MEDICINE

## 2022-07-12 VITALS
DIASTOLIC BLOOD PRESSURE: 87 MMHG | HEART RATE: 92 BPM | HEIGHT: 75 IN | RESPIRATION RATE: 18 BRPM | OXYGEN SATURATION: 100 % | SYSTOLIC BLOOD PRESSURE: 147 MMHG | TEMPERATURE: 98 F

## 2022-07-12 DIAGNOSIS — Z98.0 INTESTINAL BYPASS AND ANASTOMOSIS STATUS: Chronic | ICD-10-CM

## 2022-07-12 LAB
ALBUMIN SERPL ELPH-MCNC: 4 G/DL — SIGNIFICANT CHANGE UP (ref 3.3–5)
ALP SERPL-CCNC: 93 U/L — SIGNIFICANT CHANGE UP (ref 40–120)
ALT FLD-CCNC: 39 U/L — SIGNIFICANT CHANGE UP (ref 4–41)
ANION GAP SERPL CALC-SCNC: 11 MMOL/L — SIGNIFICANT CHANGE UP (ref 7–14)
AST SERPL-CCNC: 71 U/L — HIGH (ref 4–40)
BASOPHILS # BLD AUTO: 0.02 K/UL — SIGNIFICANT CHANGE UP (ref 0–0.2)
BASOPHILS NFR BLD AUTO: 0.4 % — SIGNIFICANT CHANGE UP (ref 0–2)
BILIRUB SERPL-MCNC: 0.4 MG/DL — SIGNIFICANT CHANGE UP (ref 0.2–1.2)
BUN SERPL-MCNC: 6 MG/DL — LOW (ref 7–23)
CALCIUM SERPL-MCNC: 8.8 MG/DL — SIGNIFICANT CHANGE UP (ref 8.4–10.5)
CHLORIDE SERPL-SCNC: 103 MMOL/L — SIGNIFICANT CHANGE UP (ref 98–107)
CO2 SERPL-SCNC: 26 MMOL/L — SIGNIFICANT CHANGE UP (ref 22–31)
CREAT SERPL-MCNC: 0.65 MG/DL — SIGNIFICANT CHANGE UP (ref 0.5–1.3)
EGFR: 111 ML/MIN/1.73M2 — SIGNIFICANT CHANGE UP
EOSINOPHIL # BLD AUTO: 0.16 K/UL — SIGNIFICANT CHANGE UP (ref 0–0.5)
EOSINOPHIL NFR BLD AUTO: 3.2 % — SIGNIFICANT CHANGE UP (ref 0–6)
GLUCOSE SERPL-MCNC: 103 MG/DL — HIGH (ref 70–99)
HCT VFR BLD CALC: 39.9 % — SIGNIFICANT CHANGE UP (ref 39–50)
HGB BLD-MCNC: 11.9 G/DL — LOW (ref 13–17)
IANC: 3.64 K/UL — SIGNIFICANT CHANGE UP (ref 1.8–7.4)
IMM GRANULOCYTES NFR BLD AUTO: 0.2 % — SIGNIFICANT CHANGE UP (ref 0–1.5)
LYMPHOCYTES # BLD AUTO: 0.63 K/UL — LOW (ref 1–3.3)
LYMPHOCYTES # BLD AUTO: 12.7 % — LOW (ref 13–44)
MAGNESIUM SERPL-MCNC: 1.7 MG/DL — SIGNIFICANT CHANGE UP (ref 1.6–2.6)
MCHC RBC-ENTMCNC: 23.9 PG — LOW (ref 27–34)
MCHC RBC-ENTMCNC: 29.8 GM/DL — LOW (ref 32–36)
MCV RBC AUTO: 80.3 FL — SIGNIFICANT CHANGE UP (ref 80–100)
MONOCYTES # BLD AUTO: 0.52 K/UL — SIGNIFICANT CHANGE UP (ref 0–0.9)
MONOCYTES NFR BLD AUTO: 10.4 % — SIGNIFICANT CHANGE UP (ref 2–14)
NEUTROPHILS # BLD AUTO: 3.64 K/UL — SIGNIFICANT CHANGE UP (ref 1.8–7.4)
NEUTROPHILS NFR BLD AUTO: 73.1 % — SIGNIFICANT CHANGE UP (ref 43–77)
NRBC # BLD: 0 /100 WBCS — SIGNIFICANT CHANGE UP
NRBC # FLD: 0 K/UL — SIGNIFICANT CHANGE UP
PHOSPHATE SERPL-MCNC: 2.8 MG/DL — SIGNIFICANT CHANGE UP (ref 2.5–4.5)
PLATELET # BLD AUTO: 202 K/UL — SIGNIFICANT CHANGE UP (ref 150–400)
POTASSIUM SERPL-MCNC: 3.8 MMOL/L — SIGNIFICANT CHANGE UP (ref 3.5–5.3)
POTASSIUM SERPL-SCNC: 3.8 MMOL/L — SIGNIFICANT CHANGE UP (ref 3.5–5.3)
PROT SERPL-MCNC: 7.2 G/DL — SIGNIFICANT CHANGE UP (ref 6–8.3)
RBC # BLD: 4.97 M/UL — SIGNIFICANT CHANGE UP (ref 4.2–5.8)
RBC # FLD: 17.3 % — HIGH (ref 10.3–14.5)
SODIUM SERPL-SCNC: 140 MMOL/L — SIGNIFICANT CHANGE UP (ref 135–145)
WBC # BLD: 4.98 K/UL — SIGNIFICANT CHANGE UP (ref 3.8–10.5)
WBC # FLD AUTO: 4.98 K/UL — SIGNIFICANT CHANGE UP (ref 3.8–10.5)

## 2022-07-12 PROCEDURE — 99284 EMERGENCY DEPT VISIT MOD MDM: CPT | Mod: 25

## 2022-07-12 PROCEDURE — 93010 ELECTROCARDIOGRAM REPORT: CPT

## 2022-07-12 RX ORDER — MECLIZINE HCL 12.5 MG
50 TABLET ORAL ONCE
Refills: 0 | Status: COMPLETED | OUTPATIENT
Start: 2022-07-12 | End: 2022-07-12

## 2022-07-12 RX ORDER — METOCLOPRAMIDE HCL 10 MG
10 TABLET ORAL ONCE
Refills: 0 | Status: DISCONTINUED | OUTPATIENT
Start: 2022-07-12 | End: 2022-07-12

## 2022-07-12 RX ORDER — MECLIZINE HCL 12.5 MG
1 TABLET ORAL
Qty: 20 | Refills: 0
Start: 2022-07-12 | End: 2022-07-16

## 2022-07-12 RX ORDER — SODIUM CHLORIDE 9 MG/ML
1000 INJECTION INTRAMUSCULAR; INTRAVENOUS; SUBCUTANEOUS ONCE
Refills: 0 | Status: COMPLETED | OUTPATIENT
Start: 2022-07-12 | End: 2022-07-12

## 2022-07-12 RX ADMIN — SODIUM CHLORIDE 1000 MILLILITER(S): 9 INJECTION INTRAMUSCULAR; INTRAVENOUS; SUBCUTANEOUS at 10:03

## 2022-07-12 RX ADMIN — Medication 50 MILLIGRAM(S): at 09:57

## 2022-07-12 NOTE — ED PROVIDER NOTE - PHYSICAL EXAMINATION
General: WN/WD NAD  Head: Atraumatic, normocephalic  Eyes: EOM grossly in tact, no scleral icterus, no discharge  ENT: moist mucous membranes  Neurology: A&Ox 3, PERRL, CN grossly intact. 5/5 strength and normal sensation throughout all four extremities. Normal FNF. No pronator drift. no facial asymmetry.   Respiratory: CTAB, no wheezing, normal respiratory effort  CV: RRR, good s1/s2, no S3, Extremities warm and well perfused  Abdominal: Soft, non-distended, non-tender, no masses  Extremities: No edema, no deformities  Skin: warm and dry. No rashes

## 2022-07-12 NOTE — ED ADULT TRIAGE NOTE - CHIEF COMPLAINT QUOTE
Pt brought in by EMS from home complaining of dizziness and nausea. Pt states he has a hx of vertigo. Pt denies chest pain, sob, fever or chills.

## 2022-07-12 NOTE — ED PROVIDER NOTE - ATTENDING CONTRIBUTION TO CARE
Patient is a 55 yo M with history of PUD s/p distal gastrectomy with Billroth II reconstruction c/b jejunogastric intussusception (2017 and 2021), chronic anemia, ITP, here for spinning sensation upon waking up this morning around 6:30 AM. Patient denies headache. He reports he tried to drink water and vomited. No focal weakness. No prior episodes. No chest pain, shortness of breath, fevers, chills, abdominal pain. No black or bloody stools. No fevers, chills or cough. He is on antibiotics since last week (about 1 week ago) as he is having dental work done. He thinks it is amoxicillin. No hearing loss. He states he has chronic problems with his right ear. No pain.     VS noted  Gen. no acute distress, Non toxic   HEENT: pupils equal, EOMI, no nystagmus, mmm, bilateral TM normal, right with some cerumen but TM visible and no erythema  Lungs: CTAB/L no C/ W /R   CVS: RRR   Abd; Soft non tender, non distended   Ext: no edema  Skin: no rash  Neuro AAOx3, CN 2-12 intact, strength equal in UE/LE, clear speech  a/p: spinning sensation - likely vertigo. Neuro exam is intact. Plan for labs, IVF, meclizine/ reglan and reassess.   - Justice TRAMMELL

## 2022-07-12 NOTE — ED PROVIDER NOTE - OBJECTIVE STATEMENT
57 yo M with no PMH presenting with complaint of room spinning dizziness that he woke up with this morning. Pt stood up and felt dizzy, it did not resolved with sitting down. He tried to drink water but threw it up. Yesterday was feeling fine. No fever, chills, cough, congestion, chest pain, abd pain, LE pain or swelling. Pt is on ABX after a dental procedure 5 jovanni days ago. Pt denies HA, weakness, numbness, tingling or tinnitus. 57 yo M with GIB presenting with complaint of room spinning dizziness that he woke up with this morning. Pt stood up and felt dizzy, it did not resolved with sitting down. He tried to drink water but threw it up. Yesterday was feeling fine. No fever, chills, cough, congestion, chest pain, abd pain, LE pain or swelling. Pt is on ABX after a dental procedure 5 jovanni days ago. Pt denies HA, weakness, numbness, tingling or tinnitus.

## 2022-07-12 NOTE — ED ADULT NURSE NOTE - OBJECTIVE STATEMENT
55 y/o male presenting to intake room 10C. Patient AAOX4, ambulatory at baseline. PAtient complaining of dizziness and vomiting. Patient states he had one episode of vomiting. Patient states when he woke up this morning and experiencing vomiting when he woke up. Patient denies chest pain, headache and dizziness at this time. Breathing even and unlabored. No pallor or diaphoresis noted at this time. #20g placed to R forearm. Labs drawn and sent as per MD order. Awaiting results.

## 2022-07-12 NOTE — ED PROVIDER NOTE - PATIENT PORTAL LINK FT
You can access the FollowMyHealth Patient Portal offered by [facility] by registering at the following website: [email or web address]. By joining [employer]’s FollowMyHealth portal, you will also be able to view your health information using other applications (apps) compatible with our system.

## 2022-07-12 NOTE — ED PROVIDER NOTE - CLINICAL SUMMARY MEDICAL DECISION MAKING FREE TEXT BOX
55 yo M with no PMH presenting with complaint of room spinning dizziness that he woke up with this morning. Differential diagnosis includes but is not limited to vertigo, BPPV, viral infection. Pt has normal neuro exam, no sx other than nausea/dizziness, would get basics. give symptomatic tx and fluids. check ecg, if normal would dc with neuro/pmd f/u.

## 2022-07-12 NOTE — ED ADULT TRIAGE NOTE - BP NONINVASIVE SYSTOLIC (MM HG)
147 At approx 3:30pm, cervix FD/100%/+2 station.  Nathan catheter removed and pt prepped/draped for delivery.  After pushing approx 1:15 min, head .  Lidocaine injected to perineum and midline episiotomy cut.  Head delivered ALBA compound with right hand at 4:54pm.  Bulb suctioned mouth at pperineum.  Body spontaneously delivered and baby to mother, skin-to-skin initiated.  Cord clamped x 2 and cut and CBR cord blood collection performed for cord blood banking.  Separate specimen of cord blood obtained for blood type.  Placenta delivered intact at 4:59pm, 3 vessel cord, no gross pathology.  Midline episiotomy repaired with 2-0 chromic suture.  Excellent hemostasis.  No lacerations.  Baby bonding well with mother, no complications.  EBL 300cc.  : male, APGAR 9/9.  Baby later admitted to [facility], mother stable.

## 2022-07-12 NOTE — ED PROVIDER NOTE - NSFOLLOWUPINSTRUCTIONS_ED_ALL_ED_FT
Please follow up with your primary care physician within 2-3 days.   Return to the ER for any new or concerning symptoms.   You may take 650 mg acetaminophen every eight hours as needed for pain.   Drink plenty of fluids and rest.   A prescription for was sent to your pharmacy, please take it as prescribed.   If you need a primary care doctor you can call: [phone]     Vertigo is a condition that causes you to feel dizzy. You may feel that you or everything around you is moving or spinning. You may also feel like you are being pulled down or toward your side.    DISCHARGE INSTRUCTIONS:  Seek care immediately if:    You have a headache and a stiff neck.  You have shaking chills and a fever.  You vomit over and over with no relief.  You have blood, pus, or fluid coming out of your ears.  You are confused.  Contact your healthcare provider if:  Your symptoms do not get better with treatment.  You have questions about your condition or care.  Medicines:  Medicine may be given to help relieve your symptoms.  Take your medicine as directed. Contact your healthcare provider if you think your medicine is not helping or if you have side effects. Tell him or her if you are allergic to any medicine. Keep a list of the medicines, vitamins, and herbs you take. Include the amounts, and when and why you take them. Bring the list or the pill bottles to follow-up visits. Carry your medicine list with you in case of an emergency.  Manage your symptoms:  Do not drive , walk without help, or operate heavy machinery when you are dizzy.  Move slowly when you move from one position to another position. Get up slowly from sitting or lying down. Sit or lie down right away if you feel dizzy.  Drink plenty of liquids. Liquids help prevent dehydration. Ask how much liquid to drink each day and which liquids are best for you.  Vestibular and balance rehabilitation therapy (VBRT) is used to teach you exercises to improve your balance and strength. These exercises may help decrease your vertigo and improve your balance. Ask for more information about this therapy.  Treatment options  The following list of medications are in some way related to or used in the treatment of this condition.    meclizine  Antivert  Dramamine II  Bonine  Phenergan    View moretreatment options    Follow up with your doctor as directed:  Write down your questions so you remember to ask them during your visits.

## 2022-08-26 ENCOUNTER — TRANSCRIPTION ENCOUNTER (OUTPATIENT)
Age: 57
End: 2022-08-26

## 2022-09-26 ENCOUNTER — TRANSCRIPTION ENCOUNTER (OUTPATIENT)
Age: 57
End: 2022-09-26

## 2023-03-09 ENCOUNTER — EMERGENCY (EMERGENCY)
Facility: HOSPITAL | Age: 58
LOS: 0 days | Discharge: HOME | End: 2023-03-09
Attending: EMERGENCY MEDICINE
Payer: OTHER GOVERNMENT

## 2023-03-09 VITALS
SYSTOLIC BLOOD PRESSURE: 147 MMHG | HEART RATE: 88 BPM | DIASTOLIC BLOOD PRESSURE: 94 MMHG | OXYGEN SATURATION: 96 % | TEMPERATURE: 98 F | RESPIRATION RATE: 18 BRPM

## 2023-03-09 VITALS
SYSTOLIC BLOOD PRESSURE: 156 MMHG | DIASTOLIC BLOOD PRESSURE: 100 MMHG | OXYGEN SATURATION: 100 % | RESPIRATION RATE: 18 BRPM | HEART RATE: 110 BPM | WEIGHT: 169.98 LBS

## 2023-03-09 DIAGNOSIS — Z20.822 CONTACT WITH AND (SUSPECTED) EXPOSURE TO COVID-19: ICD-10-CM

## 2023-03-09 DIAGNOSIS — R00.0 TACHYCARDIA, UNSPECIFIED: ICD-10-CM

## 2023-03-09 DIAGNOSIS — R06.02 SHORTNESS OF BREATH: ICD-10-CM

## 2023-03-09 DIAGNOSIS — Z86.2 PERSONAL HISTORY OF DISEASES OF THE BLOOD AND BLOOD-FORMING ORGANS AND CERTAIN DISORDERS INVOLVING THE IMMUNE MECHANISM: ICD-10-CM

## 2023-03-09 DIAGNOSIS — Z98.0 INTESTINAL BYPASS AND ANASTOMOSIS STATUS: Chronic | ICD-10-CM

## 2023-03-09 DIAGNOSIS — I10 ESSENTIAL (PRIMARY) HYPERTENSION: ICD-10-CM

## 2023-03-09 DIAGNOSIS — R42 DIZZINESS AND GIDDINESS: ICD-10-CM

## 2023-03-09 DIAGNOSIS — Z87.42 PERSONAL HISTORY OF OTHER DISEASES OF THE FEMALE GENITAL TRACT: ICD-10-CM

## 2023-03-09 DIAGNOSIS — Z91.040 LATEX ALLERGY STATUS: ICD-10-CM

## 2023-03-09 DIAGNOSIS — Z91.013 ALLERGY TO SEAFOOD: ICD-10-CM

## 2023-03-09 LAB
ALBUMIN SERPL ELPH-MCNC: 3.4 G/DL — SIGNIFICANT CHANGE UP (ref 3.3–5)
ALP SERPL-CCNC: 77 U/L — SIGNIFICANT CHANGE UP (ref 40–120)
ALT FLD-CCNC: 30 U/L — SIGNIFICANT CHANGE UP (ref 12–78)
ANION GAP SERPL CALC-SCNC: 8 MMOL/L — SIGNIFICANT CHANGE UP (ref 5–17)
APTT BLD: 33.6 SEC — SIGNIFICANT CHANGE UP (ref 27.5–35.5)
AST SERPL-CCNC: 32 U/L — SIGNIFICANT CHANGE UP (ref 15–37)
BASOPHILS # BLD AUTO: 0.05 K/UL — SIGNIFICANT CHANGE UP (ref 0–0.2)
BASOPHILS NFR BLD AUTO: 1.1 % — SIGNIFICANT CHANGE UP (ref 0–2)
BILIRUB SERPL-MCNC: 0.3 MG/DL — SIGNIFICANT CHANGE UP (ref 0.2–1.2)
BUN SERPL-MCNC: 7 MG/DL — SIGNIFICANT CHANGE UP (ref 7–23)
CALCIUM SERPL-MCNC: 8.8 MG/DL — SIGNIFICANT CHANGE UP (ref 8.5–10.1)
CHLORIDE SERPL-SCNC: 105 MMOL/L — SIGNIFICANT CHANGE UP (ref 96–108)
CO2 SERPL-SCNC: 31 MMOL/L — SIGNIFICANT CHANGE UP (ref 22–31)
CREAT SERPL-MCNC: 0.8 MG/DL — SIGNIFICANT CHANGE UP (ref 0.5–1.3)
D DIMER BLD IA.RAPID-MCNC: <150 NG/ML DDU — SIGNIFICANT CHANGE UP
EGFR: 103 ML/MIN/1.73M2 — SIGNIFICANT CHANGE UP
EOSINOPHIL # BLD AUTO: 0.22 K/UL — SIGNIFICANT CHANGE UP (ref 0–0.5)
EOSINOPHIL NFR BLD AUTO: 4.8 % — SIGNIFICANT CHANGE UP (ref 0–6)
FLUAV AG NPH QL: SIGNIFICANT CHANGE UP
FLUBV AG NPH QL: SIGNIFICANT CHANGE UP
GLUCOSE SERPL-MCNC: 106 MG/DL — HIGH (ref 70–99)
HCT VFR BLD CALC: 37.8 % — LOW (ref 39–50)
HGB BLD-MCNC: 11.5 G/DL — LOW (ref 13–17)
IMM GRANULOCYTES NFR BLD AUTO: 0.2 % — SIGNIFICANT CHANGE UP (ref 0–0.9)
INR BLD: 1.07 RATIO — SIGNIFICANT CHANGE UP (ref 0.88–1.16)
LYMPHOCYTES # BLD AUTO: 0.85 K/UL — LOW (ref 1–3.3)
LYMPHOCYTES # BLD AUTO: 18.6 % — SIGNIFICANT CHANGE UP (ref 13–44)
MCHC RBC-ENTMCNC: 24.2 PG — LOW (ref 27–34)
MCHC RBC-ENTMCNC: 30.4 G/DL — LOW (ref 32–36)
MCV RBC AUTO: 79.4 FL — LOW (ref 80–100)
MONOCYTES # BLD AUTO: 0.53 K/UL — SIGNIFICANT CHANGE UP (ref 0–0.9)
MONOCYTES NFR BLD AUTO: 11.6 % — SIGNIFICANT CHANGE UP (ref 2–14)
NEUTROPHILS # BLD AUTO: 2.91 K/UL — SIGNIFICANT CHANGE UP (ref 1.8–7.4)
NEUTROPHILS NFR BLD AUTO: 63.7 % — SIGNIFICANT CHANGE UP (ref 43–77)
NRBC # BLD: 0 /100 WBCS — SIGNIFICANT CHANGE UP (ref 0–0)
PLATELET # BLD AUTO: 219 K/UL — SIGNIFICANT CHANGE UP (ref 150–400)
POTASSIUM SERPL-MCNC: 4.6 MMOL/L — SIGNIFICANT CHANGE UP (ref 3.5–5.3)
POTASSIUM SERPL-SCNC: 4.6 MMOL/L — SIGNIFICANT CHANGE UP (ref 3.5–5.3)
PROT SERPL-MCNC: 7.2 GM/DL — SIGNIFICANT CHANGE UP (ref 6–8.3)
PROTHROM AB SERPL-ACNC: 12.8 SEC — SIGNIFICANT CHANGE UP (ref 10.5–13.4)
RBC # BLD: 4.76 M/UL — SIGNIFICANT CHANGE UP (ref 4.2–5.8)
RBC # FLD: 17.5 % — HIGH (ref 10.3–14.5)
SARS-COV-2 RNA SPEC QL NAA+PROBE: SIGNIFICANT CHANGE UP
SODIUM SERPL-SCNC: 144 MMOL/L — SIGNIFICANT CHANGE UP (ref 135–145)
TROPONIN I, HIGH SENSITIVITY RESULT: 8.7 NG/L — SIGNIFICANT CHANGE UP
TSH SERPL-MCNC: 1.57 UIU/ML — SIGNIFICANT CHANGE UP (ref 0.36–3.74)
WBC # BLD: 4.57 K/UL — SIGNIFICANT CHANGE UP (ref 3.8–10.5)
WBC # FLD AUTO: 4.57 K/UL — SIGNIFICANT CHANGE UP (ref 3.8–10.5)

## 2023-03-09 PROCEDURE — 99285 EMERGENCY DEPT VISIT HI MDM: CPT

## 2023-03-09 PROCEDURE — 71045 X-RAY EXAM CHEST 1 VIEW: CPT | Mod: 26

## 2023-03-09 PROCEDURE — 93010 ELECTROCARDIOGRAM REPORT: CPT

## 2023-03-09 RX ORDER — SODIUM CHLORIDE 9 MG/ML
2000 INJECTION INTRAMUSCULAR; INTRAVENOUS; SUBCUTANEOUS ONCE
Refills: 0 | Status: COMPLETED | OUTPATIENT
Start: 2023-03-09 | End: 2023-03-09

## 2023-03-09 RX ADMIN — SODIUM CHLORIDE 2000 MILLILITER(S): 9 INJECTION INTRAMUSCULAR; INTRAVENOUS; SUBCUTANEOUS at 20:46

## 2023-03-09 NOTE — ED PROVIDER NOTE - CLINICAL SUMMARY MEDICAL DECISION MAKING FREE TEXT BOX
57 y.o. male w/ pmhx of gastric ulcer (s/p surgical repair) and chronic anemia of unknown origins presents to the ED w/ c/o shortness of breath that is worse w/ exertion but experienced at rest.  Pt concerned for anemia given hx.  +tachycardia and +HTN.  Exam otherwise unremarkable.  Plan to obtain labs, cxr, ekg, ddimer to eval for acs vs. pe vs. anemia vs. thyroid disorder.  Will continue to monitor pt and reassess. 57 y.o. male w/ pmhx of gastric ulcer (s/p surgical repair) and chronic anemia of unknown origins presents to the ED w/ c/o shortness of breath that is worse w/ exertion but experienced at rest.  Pt concerned for anemia given hx.  +tachycardia and +HTN.  Exam otherwise unremarkable.  Plan to obtain labs, cxr, ekg, ddimer to eval for acs vs. pe vs. anemia vs. thyroid disorder.  Will continue to monitor pt and reassess. Pt feeling some improvement after fluids, possible working out related mild dehydration vs other nonemergent cause of dizziness, no SOB in ED - well appearing at dc and feeling improved - will dc with PMD follow up if symptoms do not resolve.

## 2023-03-09 NOTE — ED PROVIDER NOTE - OBJECTIVE STATEMENT
57 y.o. male w/ pmhx of gastric ulcer (s/p surgical repair) 57 y.o. male w/ pmhx of gastric ulcer (s/p surgical repair) and chronic anemia of unknown origins presents to the ED w/ c/o shortness of breath.  Pt states he has been feeling like it is harder to catch is breath over the past few days.  Today was walking to bus stop and became very short of breath.  No fever, chills, N/V/D, chest pain, dizziness, cough, abdominal pain, headache, or syncope.

## 2023-03-09 NOTE — ED PROVIDER NOTE - PHYSICAL EXAMINATION
GEN: Pt in NAD, A&O x4  HEAD: Head NCAT, Neck supple FROM.  EYES: PERRL, EOMI  ENT: No nasal d/c. MMM. uvula midline, no oral lesions.  RESP: CTA b/l, no wheezes, rales, or rhonchi.   CARDIAC: +mild tachycardia, , clear distinct S1, S2  ABD: Abdomen soft, non-tender. No CVAT b/l.  VASC: 2+ radial pulses b/l.  SKIN: No rashes, lacerations, or ecchymoses on the trunk.

## 2023-03-09 NOTE — ED ADULT TRIAGE NOTE - CHIEF COMPLAINT QUOTE
pt BIB EMS With c/o Shortness of breath  and erratic HR, denies CP hx of chronic anemia unknown etiology

## 2023-03-09 NOTE — ED PROVIDER NOTE - PATIENT PORTAL LINK FT
You can access the FollowMyHealth Patient Portal offered by [facility] by registering at the following website: [email or web address]. By joining [employer]’s FollowMyHealth portal, you will also be able to view your health information using other applications (apps) compatible with our system.

## 2023-03-09 NOTE — ED PROVIDER NOTE - ATTENDING CONTRIBUTION TO CARE
Patient evaluated and seen with NP Marium agree with above history and physical - pt examined and seen by me personally - findings as seen: Pt states felt somewhat washed out and dizzy after working out yesterday. No overt pain or feeling of room spinning sensation. No focal neuro deficits - cardiac enzyme negative, pt otherwise given some fluid hydration with some improvement.

## 2023-03-09 NOTE — ED ADULT NURSE NOTE - OBJECTIVE STATEMENT
Pt received at 1900 for c/o dizziness. A&OX3 and in no acute distress. Hx of anemia. Specimen obtained and sent to lab. Pending orders. TY Mcintosh RN

## 2023-07-05 NOTE — ED PROVIDER NOTE - PRINCIPAL DIAGNOSIS
Patient scheduled for surgery with Dr. Adenike Purcell on 07- at 9:00am with an arrival of 7:30am.  Preop surgery instructions and antibacterial soap given. Surgery consent to be signed. Intussusception

## 2023-10-15 ENCOUNTER — EMERGENCY (EMERGENCY)
Facility: HOSPITAL | Age: 58
LOS: 0 days | Discharge: HOME | End: 2023-10-15
Attending: STUDENT IN AN ORGANIZED HEALTH CARE EDUCATION/TRAINING PROGRAM
Payer: MEDICARE

## 2023-10-15 VITALS
OXYGEN SATURATION: 98 % | HEART RATE: 89 BPM | SYSTOLIC BLOOD PRESSURE: 141 MMHG | RESPIRATION RATE: 20 BRPM | HEIGHT: 73 IN | TEMPERATURE: 98 F | WEIGHT: 158.07 LBS | DIASTOLIC BLOOD PRESSURE: 88 MMHG

## 2023-10-15 DIAGNOSIS — R13.10 DYSPHAGIA, UNSPECIFIED: ICD-10-CM

## 2023-10-15 DIAGNOSIS — Z98.818 OTHER DENTAL PROCEDURE STATUS: ICD-10-CM

## 2023-10-15 DIAGNOSIS — Z98.0 INTESTINAL BYPASS AND ANASTOMOSIS STATUS: Chronic | ICD-10-CM

## 2023-10-15 DIAGNOSIS — Z91.040 LATEX ALLERGY STATUS: ICD-10-CM

## 2023-10-15 DIAGNOSIS — Z91.013 ALLERGY TO SEAFOOD: ICD-10-CM

## 2023-10-15 DIAGNOSIS — K04.7 PERIAPICAL ABSCESS WITHOUT SINUS: ICD-10-CM

## 2023-10-15 PROCEDURE — 99283 EMERGENCY DEPT VISIT LOW MDM: CPT

## 2023-10-15 RX ORDER — DEXAMETHASONE 0.5 MG/5ML
10 ELIXIR ORAL ONCE
Refills: 0 | Status: COMPLETED | OUTPATIENT
Start: 2023-10-15 | End: 2023-10-15

## 2023-10-15 RX ADMIN — Medication 10 MILLIGRAM(S): at 13:23

## 2023-10-15 RX ADMIN — Medication 300 MILLIGRAM(S): at 13:22

## 2023-10-15 NOTE — ED PROVIDER NOTE - PATIENT PORTAL LINK FT
You can access the FollowMyHealth Patient Portal offered by [facility] by registering at the following website: [email or web address]. By joining [employer]’s FollowMyHealth portal, you will also be able to view your health information using other applications (apps) compatible with our system.

## 2023-10-15 NOTE — ED PROVIDER NOTE - PHYSICAL EXAMINATION
VITAL SIGNS: I have reviewed nursing notes and confirm.  CONSTITUTIONAL: well-appearing, non-toxic, NAD  SKIN: Warm dry, normal skin turgor  HEAD: NCAT  EYES: EOMI, PERRLA, no scleral icterus  ENT: Moist mucous membranes, normal pharynx with no erythema or exudates R lower dental extraction, small abscess - spontaneously draining   NECK: Supple; non tender. Full ROM. No cervical LAD   PSYCH: Cooperative, appropriate.

## 2023-10-15 NOTE — ED ADULT TRIAGE NOTE - CHIEF COMPLAINT QUOTE
pain and swelling to inner  right side of mouth after surgical procedure on Wednesday night. currently on antibiotics.

## 2023-10-15 NOTE — ED PROVIDER NOTE - CLINICAL SUMMARY MEDICAL DECISION MAKING FREE TEXT BOX
58-year-old male with recent dental extractions on right side for mouth 6 days ago presenting to the ED with complaints of swallowing denies any associated showing dysphagia, denies any respiratory distress reports some swelling inside mouth and pain.  Patient has been taking amoxicillin as prescribed.  Due to follow-up with dental clinic tomorrow.  Denies any other complaints.    dental abscess   clindamycin   steroid course  f/u dental clinic tomorrow   return precautions

## 2023-10-15 NOTE — ED PROVIDER NOTE - OBJECTIVE STATEMENT
58-year-old male with recent dental extractions on right side for mouth 6 days ago presenting to the ED with complaints of swallowing denies any associated showing dysphagia, denies any respiratory distress reports some swelling inside mouth and pain.  Patient has been taking amoxicillin as prescribed.  Due to follow-up with dental clinic tomorrow.  Denies any other complaints.

## 2023-10-15 NOTE — ED ADULT NURSE NOTE - OBJECTIVE STATEMENT
a&ox4. patient C.O right inner mouth pain after Sx Wednesday   night denies fevers/ chills/SOB/ PAIN or difficulty swallowing right now NO PMH

## 2025-08-30 ENCOUNTER — EMERGENCY (EMERGENCY)
Facility: HOSPITAL | Age: 60
LOS: 0 days | Discharge: HOME | End: 2025-08-31
Attending: STUDENT IN AN ORGANIZED HEALTH CARE EDUCATION/TRAINING PROGRAM
Payer: MEDICARE

## 2025-08-30 VITALS
WEIGHT: 158.07 LBS | RESPIRATION RATE: 16 BRPM | TEMPERATURE: 98 F | SYSTOLIC BLOOD PRESSURE: 139 MMHG | HEART RATE: 92 BPM | OXYGEN SATURATION: 98 % | HEIGHT: 73 IN | DIASTOLIC BLOOD PRESSURE: 94 MMHG

## 2025-08-30 DIAGNOSIS — R42 DIZZINESS AND GIDDINESS: ICD-10-CM

## 2025-08-30 DIAGNOSIS — Z98.0 INTESTINAL BYPASS AND ANASTOMOSIS STATUS: Chronic | ICD-10-CM

## 2025-08-30 DIAGNOSIS — Z91.040 LATEX ALLERGY STATUS: ICD-10-CM

## 2025-08-30 LAB
HCT VFR BLD CALC: 40.5 % — SIGNIFICANT CHANGE UP (ref 39–50)
HGB BLD-MCNC: 12.3 G/DL — LOW (ref 13–17)
MCHC RBC-ENTMCNC: 23.6 PG — LOW (ref 27–34)
MCHC RBC-ENTMCNC: 30.4 G/DL — LOW (ref 32–36)
MCV RBC AUTO: 77.6 FL — LOW (ref 80–100)
PLATELET # BLD AUTO: 248 K/UL — SIGNIFICANT CHANGE UP (ref 150–400)
RBC # BLD: 5.22 M/UL — SIGNIFICANT CHANGE UP (ref 4.2–5.8)
RBC # FLD: 23.9 % — HIGH (ref 10.3–14.5)
WBC # BLD: 5.12 K/UL — SIGNIFICANT CHANGE UP (ref 3.8–10.5)
WBC # FLD AUTO: 5.12 K/UL — SIGNIFICANT CHANGE UP (ref 3.8–10.5)

## 2025-08-30 PROCEDURE — 99285 EMERGENCY DEPT VISIT HI MDM: CPT

## 2025-08-30 RX ORDER — MECLIZINE HCL 12.5 MG
25 TABLET ORAL ONCE
Refills: 0 | Status: COMPLETED | OUTPATIENT
Start: 2025-08-30 | End: 2025-08-30

## 2025-08-30 RX ADMIN — Medication 25 MILLIGRAM(S): at 23:26

## 2025-08-30 RX ADMIN — Medication 1000 MILLILITER(S): at 23:26

## 2025-08-31 VITALS
RESPIRATION RATE: 17 BRPM | DIASTOLIC BLOOD PRESSURE: 100 MMHG | HEART RATE: 77 BPM | TEMPERATURE: 98 F | SYSTOLIC BLOOD PRESSURE: 158 MMHG | OXYGEN SATURATION: 98 %

## 2025-08-31 LAB
ALBUMIN SERPL ELPH-MCNC: 3.7 G/DL — SIGNIFICANT CHANGE UP (ref 3.3–5)
ALP SERPL-CCNC: 75 U/L — SIGNIFICANT CHANGE UP (ref 40–120)
ALT FLD-CCNC: 28 U/L — SIGNIFICANT CHANGE UP (ref 12–78)
ANION GAP SERPL CALC-SCNC: 4 MMOL/L — LOW (ref 5–17)
ANISOCYTOSIS BLD QL: SIGNIFICANT CHANGE UP
AST SERPL-CCNC: 24 U/L — SIGNIFICANT CHANGE UP (ref 15–37)
BASOPHILS # BLD AUTO: 0.03 K/UL — SIGNIFICANT CHANGE UP (ref 0–0.2)
BASOPHILS NFR BLD AUTO: 0.6 % — SIGNIFICANT CHANGE UP (ref 0–2)
BILIRUB SERPL-MCNC: 0.5 MG/DL — SIGNIFICANT CHANGE UP (ref 0.2–1.2)
BUN SERPL-MCNC: 6 MG/DL — LOW (ref 7–23)
CALCIUM SERPL-MCNC: 9.1 MG/DL — SIGNIFICANT CHANGE UP (ref 8.5–10.1)
CHLORIDE SERPL-SCNC: 108 MMOL/L — SIGNIFICANT CHANGE UP (ref 96–108)
CO2 SERPL-SCNC: 32 MMOL/L — HIGH (ref 22–31)
CREAT SERPL-MCNC: 0.83 MG/DL — SIGNIFICANT CHANGE UP (ref 0.5–1.3)
DACRYOCYTES BLD QL SMEAR: SLIGHT — SIGNIFICANT CHANGE UP
EGFR: 100 ML/MIN/1.73M2 — SIGNIFICANT CHANGE UP
EGFR: 100 ML/MIN/1.73M2 — SIGNIFICANT CHANGE UP
ELLIPTOCYTES BLD QL SMEAR: SLIGHT — SIGNIFICANT CHANGE UP
EOSINOPHIL # BLD AUTO: 0.14 K/UL — SIGNIFICANT CHANGE UP (ref 0–0.5)
EOSINOPHIL NFR BLD AUTO: 2.7 % — SIGNIFICANT CHANGE UP (ref 0–6)
GLUCOSE SERPL-MCNC: 105 MG/DL — HIGH (ref 70–99)
HYPOCHROMIA BLD QL: SLIGHT — SIGNIFICANT CHANGE UP
IMM GRANULOCYTES NFR BLD AUTO: 0.2 % — SIGNIFICANT CHANGE UP (ref 0–0.9)
LG PLATELETS BLD QL AUTO: SLIGHT — SIGNIFICANT CHANGE UP
LYMPHOCYTES # BLD AUTO: 0.61 K/UL — LOW (ref 1–3.3)
LYMPHOCYTES # BLD AUTO: 11.9 % — LOW (ref 13–44)
MACROCYTES BLD QL: SLIGHT — SIGNIFICANT CHANGE UP
MAGNESIUM SERPL-MCNC: 1.9 MG/DL — SIGNIFICANT CHANGE UP (ref 1.6–2.6)
MANUAL SMEAR VERIFICATION: SIGNIFICANT CHANGE UP
MICROCYTES BLD QL: SIGNIFICANT CHANGE UP
MONOCYTES # BLD AUTO: 0.37 K/UL — SIGNIFICANT CHANGE UP (ref 0–0.9)
MONOCYTES NFR BLD AUTO: 7.2 % — SIGNIFICANT CHANGE UP (ref 2–14)
NEUTROPHILS # BLD AUTO: 3.96 K/UL — SIGNIFICANT CHANGE UP (ref 1.8–7.4)
NEUTROPHILS NFR BLD AUTO: 77.4 % — HIGH (ref 43–77)
NRBC BLD AUTO-RTO: 0 /100 WBCS — SIGNIFICANT CHANGE UP (ref 0–0)
OVALOCYTES BLD QL SMEAR: SLIGHT — SIGNIFICANT CHANGE UP
PLAT MORPH BLD: ABNORMAL
PLATELET CLUMP BLD QL SMEAR: ABNORMAL
POIKILOCYTOSIS BLD QL AUTO: SIGNIFICANT CHANGE UP
POLYCHROMASIA BLD QL SMEAR: SLIGHT — SIGNIFICANT CHANGE UP
POTASSIUM SERPL-MCNC: 4 MMOL/L — SIGNIFICANT CHANGE UP (ref 3.5–5.3)
POTASSIUM SERPL-SCNC: 4 MMOL/L — SIGNIFICANT CHANGE UP (ref 3.5–5.3)
PROT SERPL-MCNC: 7.5 GM/DL — SIGNIFICANT CHANGE UP (ref 6–8.3)
RBC BLD AUTO: ABNORMAL
SODIUM SERPL-SCNC: 144 MMOL/L — SIGNIFICANT CHANGE UP (ref 135–145)
TARGETS BLD QL SMEAR: SLIGHT — SIGNIFICANT CHANGE UP

## 2025-08-31 PROCEDURE — 93010 ELECTROCARDIOGRAM REPORT: CPT

## 2025-08-31 RX ORDER — MECLIZINE HCL 12.5 MG
1 TABLET ORAL
Qty: 21 | Refills: 0
Start: 2025-08-31 | End: 2025-09-06